# Patient Record
Sex: FEMALE | Race: WHITE | NOT HISPANIC OR LATINO | Employment: UNEMPLOYED | URBAN - METROPOLITAN AREA
[De-identification: names, ages, dates, MRNs, and addresses within clinical notes are randomized per-mention and may not be internally consistent; named-entity substitution may affect disease eponyms.]

---

## 2017-07-06 ENCOUNTER — HOSPITAL ENCOUNTER (EMERGENCY)
Facility: HOSPITAL | Age: 47
Discharge: HOME | End: 2017-07-06
Attending: EMERGENCY MEDICINE
Payer: COMMERCIAL

## 2017-07-06 ENCOUNTER — APPOINTMENT (EMERGENCY)
Dept: RADIOLOGY | Facility: HOSPITAL | Age: 47
End: 2017-07-06
Payer: COMMERCIAL

## 2017-07-06 VITALS
BODY MASS INDEX: 25.69 KG/M2 | RESPIRATION RATE: 18 BRPM | HEIGHT: 63 IN | HEART RATE: 82 BPM | WEIGHT: 145 LBS | OXYGEN SATURATION: 97 % | SYSTOLIC BLOOD PRESSURE: 137 MMHG | DIASTOLIC BLOOD PRESSURE: 94 MMHG | TEMPERATURE: 97.6 F

## 2017-07-06 DIAGNOSIS — S60.222A CONTUSION OF LEFT HAND: Primary | ICD-10-CM

## 2017-07-06 PROCEDURE — 99283 EMERGENCY DEPT VISIT LOW MDM: CPT

## 2017-07-06 PROCEDURE — 73110 X-RAY EXAM OF WRIST: CPT

## 2017-07-06 PROCEDURE — 73130 X-RAY EXAM OF HAND: CPT

## 2018-11-08 ENCOUNTER — OFFICE VISIT (OUTPATIENT)
Dept: FAMILY MEDICINE CLINIC | Facility: CLINIC | Age: 48
End: 2018-11-08
Payer: COMMERCIAL

## 2018-11-08 VITALS
OXYGEN SATURATION: 98 % | HEIGHT: 63 IN | BODY MASS INDEX: 24.63 KG/M2 | DIASTOLIC BLOOD PRESSURE: 70 MMHG | HEART RATE: 88 BPM | SYSTOLIC BLOOD PRESSURE: 102 MMHG | WEIGHT: 139 LBS | TEMPERATURE: 97.4 F

## 2018-11-08 DIAGNOSIS — Z00.00 HEALTH MAINTENANCE EXAMINATION: ICD-10-CM

## 2018-11-08 DIAGNOSIS — D21.9 FIBROMA: ICD-10-CM

## 2018-11-08 DIAGNOSIS — G47.00 INSOMNIA, UNSPECIFIED TYPE: Primary | ICD-10-CM

## 2018-11-08 DIAGNOSIS — Z72.0 TOBACCO USE: ICD-10-CM

## 2018-11-08 DIAGNOSIS — E78.5 HYPERLIPIDEMIA, UNSPECIFIED HYPERLIPIDEMIA TYPE: ICD-10-CM

## 2018-11-08 DIAGNOSIS — R53.83 FATIGUE, UNSPECIFIED TYPE: ICD-10-CM

## 2018-11-08 DIAGNOSIS — M79.641 PAIN OF RIGHT HAND: ICD-10-CM

## 2018-11-08 PROCEDURE — 99204 OFFICE O/P NEW MOD 45 MIN: CPT | Performed by: FAMILY MEDICINE

## 2018-11-08 PROCEDURE — 3008F BODY MASS INDEX DOCD: CPT | Performed by: FAMILY MEDICINE

## 2018-11-08 RX ORDER — ZOLPIDEM TARTRATE 6.25 MG/1
TABLET, FILM COATED, EXTENDED RELEASE ORAL
Qty: 30 TABLET | Refills: 1 | Status: SHIPPED | OUTPATIENT
Start: 2018-11-08 | End: 2018-11-15

## 2018-11-08 NOTE — PROGRESS NOTES
Assessment/Plan:    Return visit in about 1 month we will remove her skin lesion at that time       Problem List Items Addressed This Visit     Health maintenance examination    Pain of right hand    Relevant Orders    XR hand 3+ vw right    Fibroma    Insomnia - Primary     She has tried multiple over-the-counter medications without relief         Relevant Medications    zolpidem (AMBIEN CR) 6 25 MG CR tablet    Fatigue    Relevant Orders    Lyme Antibody Profile with reflex to WB    TSH, 3rd generation with Free T4 reflex    CBC and differential    Hyperlipidemia     Low carb diet         Relevant Orders    Comprehensive metabolic panel    Lipid panel    Tobacco use     Smoking cessation recommended                 Subjective:      Patient ID: Jose R Croft is a 50 y o  female  This is a new patient to our practice  She has not had a primary care doctor in several years  She complains of pain in her right hand at the base of her thumb  She complains of skin lesion on her right hand that gives her pain  For the last 2 years she has had difficulty sleeping and complains of fatigue  She is a cigarette smoker but is otherwise in good health  She is bit behind on her gynecologic exam however has appointment with her gynecologist next week  The following portions of the patient's history were reviewed and updated as appropriate: allergies, current medications, past family history, past medical history, past social history, past surgical history and problem list     Review of Systems   Constitutional: Positive for fatigue  Negative for fever  HENT: Negative  Eyes: Negative  Respiratory: Negative  Cardiovascular: Negative  Gastrointestinal: Negative  Endocrine: Negative  Genitourinary: Negative  Musculoskeletal: Positive for arthralgias  Skin: Negative  Allergic/Immunologic: Negative  Neurological: Negative  Hematological: Negative  Psychiatric/Behavioral: Negative  Objective:      /70   Pulse 88   Temp (!) 97 4 °F (36 3 °C)   Ht 5' 3" (1 6 m)   Wt 63 kg (139 lb)   SpO2 98%   BMI 24 62 kg/m²          Physical Exam   Constitutional: She is oriented to person, place, and time  She appears well-developed  HENT:   Head: Normocephalic and atraumatic  Right Ear: Tympanic membrane and external ear normal    Left Ear: Tympanic membrane and external ear normal    Mouth/Throat: Oropharynx is clear and moist    Eyes: Pupils are equal, round, and reactive to light  EOM are normal    Neck: Neck supple  No thyromegaly present  Cardiovascular: Normal rate, regular rhythm and normal heart sounds  Pulmonary/Chest: Effort normal and breath sounds normal    Abdominal: Soft  Musculoskeletal: Normal range of motion  Tenderness of 1st MP joint right hand   Lymphadenopathy:     She has no cervical adenopathy  Neurological: She is alert and oriented to person, place, and time  Skin: Skin is warm and dry  Fibroma dorsum of right hand   Psychiatric: She has a normal mood and affect

## 2018-11-13 ENCOUNTER — HOSPITAL ENCOUNTER (OUTPATIENT)
Dept: RADIOLOGY | Facility: HOSPITAL | Age: 48
Discharge: HOME | End: 2018-11-13
Attending: FAMILY MEDICINE
Payer: COMMERCIAL

## 2018-11-13 ENCOUNTER — APPOINTMENT (OUTPATIENT)
Dept: LAB | Facility: HOSPITAL | Age: 48
End: 2018-11-13
Attending: FAMILY MEDICINE
Payer: COMMERCIAL

## 2018-11-13 DIAGNOSIS — E78.5 HYPERLIPIDEMIA, UNSPECIFIED HYPERLIPIDEMIA TYPE: ICD-10-CM

## 2018-11-13 DIAGNOSIS — R53.83 FATIGUE, UNSPECIFIED TYPE: ICD-10-CM

## 2018-11-13 DIAGNOSIS — M79.641 PAIN OF RIGHT HAND: ICD-10-CM

## 2018-11-13 LAB
ALBUMIN SERPL BCP-MCNC: 3.7 G/DL (ref 3.5–5)
ALP SERPL-CCNC: 98 U/L (ref 46–116)
ALT SERPL W P-5'-P-CCNC: 16 U/L (ref 12–78)
ANION GAP SERPL CALCULATED.3IONS-SCNC: 10 MMOL/L (ref 4–13)
AST SERPL W P-5'-P-CCNC: 11 U/L (ref 5–45)
BASOPHILS # BLD AUTO: 0.08 THOUSANDS/ΜL (ref 0–0.1)
BASOPHILS NFR BLD AUTO: 1 % (ref 0–1)
BILIRUB SERPL-MCNC: 0.5 MG/DL (ref 0.2–1)
BUN SERPL-MCNC: 11 MG/DL (ref 5–25)
CALCIUM SERPL-MCNC: 8.9 MG/DL (ref 8.3–10.1)
CHLORIDE SERPL-SCNC: 102 MMOL/L (ref 100–108)
CHOLEST SERPL-MCNC: 245 MG/DL (ref 50–200)
CO2 SERPL-SCNC: 28 MMOL/L (ref 21–32)
CREAT SERPL-MCNC: 0.75 MG/DL (ref 0.6–1.3)
EOSINOPHIL # BLD AUTO: 0.17 THOUSAND/ΜL (ref 0–0.61)
EOSINOPHIL NFR BLD AUTO: 2 % (ref 0–6)
ERYTHROCYTE [DISTWIDTH] IN BLOOD BY AUTOMATED COUNT: 14.5 % (ref 11.6–15.1)
GFR SERPL CREATININE-BSD FRML MDRD: 95 ML/MIN/1.73SQ M
GLUCOSE P FAST SERPL-MCNC: 87 MG/DL (ref 65–99)
HCT VFR BLD AUTO: 42.7 % (ref 34.8–46.1)
HDLC SERPL-MCNC: 51 MG/DL (ref 40–60)
HGB BLD-MCNC: 13.8 G/DL (ref 11.5–15.4)
IMM GRANULOCYTES # BLD AUTO: 0.02 THOUSAND/UL (ref 0–0.2)
IMM GRANULOCYTES NFR BLD AUTO: 0 % (ref 0–2)
LDLC SERPL CALC-MCNC: 181 MG/DL (ref 0–100)
LYMPHOCYTES # BLD AUTO: 3.83 THOUSANDS/ΜL (ref 0.6–4.47)
LYMPHOCYTES NFR BLD AUTO: 40 % (ref 14–44)
MCH RBC QN AUTO: 28.3 PG (ref 26.8–34.3)
MCHC RBC AUTO-ENTMCNC: 32.3 G/DL (ref 31.4–37.4)
MCV RBC AUTO: 88 FL (ref 82–98)
MONOCYTES # BLD AUTO: 0.87 THOUSAND/ΜL (ref 0.17–1.22)
MONOCYTES NFR BLD AUTO: 9 % (ref 4–12)
NEUTROPHILS # BLD AUTO: 4.68 THOUSANDS/ΜL (ref 1.85–7.62)
NEUTS SEG NFR BLD AUTO: 48 % (ref 43–75)
NONHDLC SERPL-MCNC: 194 MG/DL
NRBC BLD AUTO-RTO: 0 /100 WBCS
PLATELET # BLD AUTO: 252 THOUSANDS/UL (ref 149–390)
PMV BLD AUTO: 10.3 FL (ref 8.9–12.7)
POTASSIUM SERPL-SCNC: 4.2 MMOL/L (ref 3.5–5.3)
PROT SERPL-MCNC: 7.1 G/DL (ref 6.4–8.2)
RBC # BLD AUTO: 4.87 MILLION/UL (ref 3.81–5.12)
SODIUM SERPL-SCNC: 140 MMOL/L (ref 136–145)
TRIGL SERPL-MCNC: 66 MG/DL
TSH SERPL DL<=0.05 MIU/L-ACNC: 2.09 UIU/ML (ref 0.36–3.74)
WBC # BLD AUTO: 9.65 THOUSAND/UL (ref 4.31–10.16)

## 2018-11-13 PROCEDURE — 36415 COLL VENOUS BLD VENIPUNCTURE: CPT

## 2018-11-13 PROCEDURE — 85025 COMPLETE CBC W/AUTO DIFF WBC: CPT

## 2018-11-13 PROCEDURE — 80053 COMPREHEN METABOLIC PANEL: CPT

## 2018-11-13 PROCEDURE — 73130 X-RAY EXAM OF HAND: CPT

## 2018-11-13 PROCEDURE — 86618 LYME DISEASE ANTIBODY: CPT

## 2018-11-13 PROCEDURE — 80061 LIPID PANEL: CPT

## 2018-11-13 PROCEDURE — 84443 ASSAY THYROID STIM HORMONE: CPT

## 2018-11-14 LAB
B BURGDOR IGG SER IA-ACNC: 0.08
B BURGDOR IGM SER IA-ACNC: 0.3

## 2018-11-15 ENCOUNTER — TELEPHONE (OUTPATIENT)
Dept: FAMILY MEDICINE CLINIC | Facility: CLINIC | Age: 48
End: 2018-11-15

## 2018-11-15 DIAGNOSIS — G47.00 INSOMNIA, UNSPECIFIED TYPE: ICD-10-CM

## 2018-11-15 DIAGNOSIS — E78.5 HYPERLIPIDEMIA, UNSPECIFIED HYPERLIPIDEMIA TYPE: Primary | ICD-10-CM

## 2018-11-15 RX ORDER — TRAZODONE HYDROCHLORIDE 100 MG/1
TABLET ORAL
Qty: 30 TABLET | Refills: 1 | Status: SHIPPED | OUTPATIENT
Start: 2018-11-15 | End: 2020-02-03

## 2018-11-15 RX ORDER — ATORVASTATIN CALCIUM 10 MG/1
10 TABLET, FILM COATED ORAL DAILY
Qty: 30 TABLET | Refills: 5 | Status: SHIPPED | OUTPATIENT
Start: 2018-11-15 | End: 2019-05-15 | Stop reason: SDUPTHER

## 2018-11-15 NOTE — TELEPHONE ENCOUNTER
----- Message from Valorie Lynn MD sent at 11/15/2018  8:35 AM EST -----  Call patient, cholesterol is high  All other labs are normal   She should start medication for cholesterol

## 2018-11-15 NOTE — TELEPHONE ENCOUNTER
Prescription for Lipitor and trazodone sent to [facility]    She should follow up in 4 months with fasting blood work prior to visit

## 2018-11-15 NOTE — TELEPHONE ENCOUNTER
Spoke with pt  She agreed to try something for high cholesterol  She also wants to let you know that  Ambien is not working for her and if you could please send something else for her to help her sleep

## 2018-12-13 ENCOUNTER — OFFICE VISIT (OUTPATIENT)
Dept: FAMILY MEDICINE CLINIC | Facility: CLINIC | Age: 48
End: 2018-12-13
Payer: COMMERCIAL

## 2018-12-13 VITALS
WEIGHT: 143 LBS | SYSTOLIC BLOOD PRESSURE: 112 MMHG | HEIGHT: 63 IN | DIASTOLIC BLOOD PRESSURE: 70 MMHG | HEART RATE: 76 BPM | OXYGEN SATURATION: 98 % | TEMPERATURE: 96.7 F | BODY MASS INDEX: 25.34 KG/M2

## 2018-12-13 DIAGNOSIS — D21.9 FIBROMA: Primary | ICD-10-CM

## 2018-12-13 DIAGNOSIS — Z12.31 BREAST CANCER SCREENING BY MAMMOGRAM: ICD-10-CM

## 2018-12-13 DIAGNOSIS — G47.00 INSOMNIA, UNSPECIFIED TYPE: ICD-10-CM

## 2018-12-13 PROCEDURE — 88305 TISSUE EXAM BY PATHOLOGIST: CPT | Performed by: PATHOLOGY

## 2018-12-13 PROCEDURE — 11400 EXC TR-EXT B9+MARG 0.5 CM<: CPT | Performed by: FAMILY MEDICINE

## 2018-12-13 PROCEDURE — 3008F BODY MASS INDEX DOCD: CPT | Performed by: FAMILY MEDICINE

## 2018-12-13 PROCEDURE — 21012 EXC FACE LES SBQ 2 CM/>: CPT | Performed by: FAMILY MEDICINE

## 2018-12-13 PROCEDURE — 99213 OFFICE O/P EST LOW 20 MIN: CPT | Performed by: FAMILY MEDICINE

## 2018-12-13 RX ORDER — ZOLPIDEM TARTRATE 6.25 MG/1
1 TABLET, FILM COATED, EXTENDED RELEASE ORAL
Refills: 0 | COMMUNITY
Start: 2018-11-27 | End: 2018-12-13 | Stop reason: SDUPTHER

## 2018-12-13 RX ORDER — ZOLPIDEM TARTRATE 12.5 MG/1
12.5 TABLET, FILM COATED, EXTENDED RELEASE ORAL
Qty: 30 TABLET | Refills: 5 | Status: SHIPPED | OUTPATIENT
Start: 2018-12-13 | End: 2019-06-13 | Stop reason: SDUPTHER

## 2018-12-13 NOTE — PROGRESS NOTES
Assessment/Plan:           Problem List Items Addressed This Visit     None      Visit Diagnoses     Breast cancer screening by mammogram    -  Primary    Relevant Orders    Mammo screening bilateral w cad            Subjective:      Patient ID: Stephen Blount is a 50 y o  female  Patient comes in for skin lesion removal from right wrist         The following portions of the patient's history were reviewed and updated as appropriate: allergies, current medications, past family history, past medical history, past social history, past surgical history and problem list     Review of Systems   Constitutional: Negative  Respiratory: Negative  Cardiovascular: Negative            Objective:      /70   Pulse 76   Temp (!) 96 7 °F (35 9 °C)   Ht 5' 3" (1 6 m)   Wt 64 9 kg (143 lb)   SpO2 98%   BMI 25 33 kg/m²            Physical Exam   Skin:   Nodular lesion dorsum of right wrist     Skin excision  Date/Time: 12/13/2018 12:34 PM  Performed by: Angela Yost  Authorized by: Angela Yost     Procedure Details - Skin Excision:     Number of Lesions:  1  Lesion 1:     Body area:  Upper extremity    Upper extremity location:  R wrist       Malignancy: benign lesion      Repair type:  Linear closure    Closure complexity: simple      Surgical defect:  2 cm    Repair size (cm):  2     Wound repaired with 4 Ethilon x3

## 2018-12-24 ENCOUNTER — OFFICE VISIT (OUTPATIENT)
Dept: FAMILY MEDICINE CLINIC | Facility: CLINIC | Age: 48
End: 2018-12-24
Payer: COMMERCIAL

## 2018-12-24 VITALS
BODY MASS INDEX: 25.34 KG/M2 | SYSTOLIC BLOOD PRESSURE: 108 MMHG | HEART RATE: 72 BPM | DIASTOLIC BLOOD PRESSURE: 70 MMHG | HEIGHT: 63 IN | OXYGEN SATURATION: 96 % | RESPIRATION RATE: 16 BRPM | WEIGHT: 143 LBS | TEMPERATURE: 98 F

## 2018-12-24 DIAGNOSIS — D23.9 DERMATOFIBROMA: Primary | ICD-10-CM

## 2018-12-24 PROCEDURE — 99213 OFFICE O/P EST LOW 20 MIN: CPT | Performed by: FAMILY MEDICINE

## 2018-12-24 NOTE — PROGRESS NOTES
Patient comes in for suture removal   Three sutures removed from right wrist   Area is healing well without sign of infection

## 2019-01-11 DIAGNOSIS — Z12.31 BREAST CANCER SCREENING BY MAMMOGRAM: ICD-10-CM

## 2019-05-15 DIAGNOSIS — E78.5 HYPERLIPIDEMIA, UNSPECIFIED HYPERLIPIDEMIA TYPE: ICD-10-CM

## 2019-05-15 RX ORDER — ATORVASTATIN CALCIUM 10 MG/1
10 TABLET, FILM COATED ORAL DAILY
Qty: 30 TABLET | Refills: 5 | Status: SHIPPED | OUTPATIENT
Start: 2019-05-15 | End: 2019-11-17 | Stop reason: SDUPTHER

## 2019-06-13 DIAGNOSIS — G47.00 INSOMNIA, UNSPECIFIED TYPE: ICD-10-CM

## 2019-06-14 RX ORDER — ZOLPIDEM TARTRATE 12.5 MG/1
12.5 TABLET, FILM COATED, EXTENDED RELEASE ORAL
Qty: 30 TABLET | Refills: 0 | Status: SHIPPED | OUTPATIENT
Start: 2019-06-14 | End: 2019-07-15 | Stop reason: SDUPTHER

## 2019-07-15 DIAGNOSIS — G47.00 INSOMNIA, UNSPECIFIED TYPE: ICD-10-CM

## 2019-07-15 RX ORDER — ZOLPIDEM TARTRATE 12.5 MG/1
TABLET, FILM COATED, EXTENDED RELEASE ORAL
Qty: 30 TABLET | Refills: 0 | Status: SHIPPED | OUTPATIENT
Start: 2019-07-15 | End: 2019-08-18 | Stop reason: SDUPTHER

## 2019-08-18 DIAGNOSIS — G47.00 INSOMNIA, UNSPECIFIED TYPE: ICD-10-CM

## 2019-08-18 RX ORDER — ZOLPIDEM TARTRATE 12.5 MG/1
12.5 TABLET, FILM COATED, EXTENDED RELEASE ORAL
Qty: 30 TABLET | Refills: 3 | Status: SHIPPED | OUTPATIENT
Start: 2019-08-18 | End: 2019-12-18 | Stop reason: SDUPTHER

## 2019-11-17 DIAGNOSIS — E78.5 HYPERLIPIDEMIA, UNSPECIFIED HYPERLIPIDEMIA TYPE: ICD-10-CM

## 2019-11-17 RX ORDER — ATORVASTATIN CALCIUM 10 MG/1
TABLET, FILM COATED ORAL
Qty: 30 TABLET | Refills: 5 | Status: SHIPPED | OUTPATIENT
Start: 2019-11-17 | End: 2020-05-13

## 2019-12-18 DIAGNOSIS — G47.00 INSOMNIA, UNSPECIFIED TYPE: ICD-10-CM

## 2019-12-18 RX ORDER — ZOLPIDEM TARTRATE 12.5 MG/1
TABLET, FILM COATED, EXTENDED RELEASE ORAL
Qty: 30 TABLET | Refills: 0 | Status: SHIPPED | OUTPATIENT
Start: 2019-12-18 | End: 2020-02-03 | Stop reason: SDUPTHER

## 2020-01-17 DIAGNOSIS — G47.00 INSOMNIA, UNSPECIFIED TYPE: ICD-10-CM

## 2020-01-19 RX ORDER — ZOLPIDEM TARTRATE 12.5 MG/1
TABLET, FILM COATED, EXTENDED RELEASE ORAL
Qty: 30 TABLET | Refills: 0 | OUTPATIENT
Start: 2020-01-19

## 2020-02-03 ENCOUNTER — OFFICE VISIT (OUTPATIENT)
Dept: FAMILY MEDICINE CLINIC | Facility: CLINIC | Age: 50
End: 2020-02-03
Payer: COMMERCIAL

## 2020-02-03 VITALS
RESPIRATION RATE: 16 BRPM | DIASTOLIC BLOOD PRESSURE: 70 MMHG | HEART RATE: 74 BPM | WEIGHT: 156 LBS | OXYGEN SATURATION: 98 % | SYSTOLIC BLOOD PRESSURE: 110 MMHG | HEIGHT: 62 IN | BODY MASS INDEX: 28.71 KG/M2 | TEMPERATURE: 98 F

## 2020-02-03 DIAGNOSIS — G47.00 INSOMNIA, UNSPECIFIED TYPE: ICD-10-CM

## 2020-02-03 DIAGNOSIS — Z00.00 HEALTH MAINTENANCE EXAMINATION: Primary | ICD-10-CM

## 2020-02-03 DIAGNOSIS — Z12.11 COLON CANCER SCREENING: ICD-10-CM

## 2020-02-03 DIAGNOSIS — Z72.0 TOBACCO USE: ICD-10-CM

## 2020-02-03 DIAGNOSIS — E78.5 HYPERLIPIDEMIA, UNSPECIFIED HYPERLIPIDEMIA TYPE: ICD-10-CM

## 2020-02-03 PROBLEM — D23.9 DERMATOFIBROMA: Status: RESOLVED | Noted: 2018-12-24 | Resolved: 2020-02-03

## 2020-02-03 PROBLEM — M79.641 PAIN OF RIGHT HAND: Status: RESOLVED | Noted: 2018-11-08 | Resolved: 2020-02-03

## 2020-02-03 PROBLEM — D21.9 FIBROMA: Status: RESOLVED | Noted: 2018-11-08 | Resolved: 2020-02-03

## 2020-02-03 PROCEDURE — 99396 PREV VISIT EST AGE 40-64: CPT | Performed by: FAMILY MEDICINE

## 2020-02-03 RX ORDER — ZOLPIDEM TARTRATE 12.5 MG/1
12.5 TABLET, FILM COATED, EXTENDED RELEASE ORAL
Qty: 30 TABLET | Refills: 5 | Status: SHIPPED | OUTPATIENT
Start: 2020-02-03 | End: 2020-08-23

## 2020-02-03 NOTE — PROGRESS NOTES
Assessment/Plan:  Return visit in 1 year  Patient will make appointment with her gynecologist and have mammogram done  Diagnoses and all orders for this visit:    Health maintenance examination    Hyperlipidemia, unspecified hyperlipidemia type  -     CBC and differential; Future  -     Comprehensive metabolic panel; Future  -     Lipid panel; Future    Insomnia, unspecified type  -     zolpidem (AMBIEN CR) 12 5 MG CR tablet; Take 1 tablet (12 5 mg total) by mouth daily at bedtime as needed for sleep    Tobacco use    Colon cancer screening  -     Ambulatory referral to Gastroenterology; Future        Hyperlipidemia  Continue Lipitor 10 mg daily  Tobacco use  Smoking cessation recommended    BMI Counseling: Body mass index is 28 53 kg/m²  The BMI is above normal  Nutrition recommendations include decreasing portion sizes and moderation in carbohydrate intake  Exercise recommendations include exercising 3-5 times per week  No pharmacotherapy was ordered  Subjective:      Patient ID: David King is a 48 y o  female  Patient comes in for a checkup  The following portions of the patient's history were reviewed and updated as appropriate:   She has no past medical history on file  ,  does not have any pertinent problems on file  ,   has a past surgical history that includes Cervical biopsy w/ loop electrode excision  ,  family history includes Colon cancer in her maternal grandfather  ,   reports that she has been smoking  She has never used smokeless tobacco  She reports that she drinks alcohol  She reports that she does not use drugs  ,  is allergic to amoxil [amoxicillin]     Current Outpatient Medications   Medication Sig Dispense Refill    atorvastatin (LIPITOR) 10 mg tablet take 1 tablet by mouth once daily 30 tablet 5    zolpidem (AMBIEN CR) 12 5 MG CR tablet Take 1 tablet (12 5 mg total) by mouth daily at bedtime as needed for sleep 30 tablet 5     No current facility-administered medications for this visit  Review of Systems   Constitutional: Negative  HENT: Negative  Respiratory: Negative  Cardiovascular: Negative  Gastrointestinal: Negative  Endocrine: Negative  Genitourinary: Negative  Allergic/Immunologic: Negative  Neurological: Negative  Objective:  Vitals:    02/03/20 1033   BP: 110/70   Pulse: 74   Resp: 16   Temp: 98 °F (36 7 °C)   SpO2: 98%   Weight: 70 8 kg (156 lb)   Height: 5' 2" (1 575 m)     Body mass index is 28 53 kg/m²  Physical Exam   Constitutional: She is oriented to person, place, and time  She appears well-developed  HENT:   Head: Normocephalic and atraumatic  Right Ear: Tympanic membrane and external ear normal    Left Ear: Tympanic membrane and external ear normal    Mouth/Throat: Oropharynx is clear and moist    Eyes: Pupils are equal, round, and reactive to light  EOM are normal    Neck: Neck supple  No thyromegaly present  Cardiovascular: Normal rate, regular rhythm and normal heart sounds  Pulmonary/Chest: Effort normal and breath sounds normal    Abdominal: Soft  Musculoskeletal: Normal range of motion  Lymphadenopathy:     She has no cervical adenopathy  Neurological: She is alert and oriented to person, place, and time  Skin: Skin is warm and dry  Psychiatric: She has a normal mood and affect

## 2020-05-13 DIAGNOSIS — E78.5 HYPERLIPIDEMIA, UNSPECIFIED HYPERLIPIDEMIA TYPE: ICD-10-CM

## 2020-05-13 RX ORDER — ATORVASTATIN CALCIUM 10 MG/1
TABLET, FILM COATED ORAL
Qty: 30 TABLET | Refills: 5 | Status: SHIPPED | OUTPATIENT
Start: 2020-05-13 | End: 2020-09-14 | Stop reason: HOSPADM

## 2020-07-21 ENCOUNTER — HOSPITAL ENCOUNTER (EMERGENCY)
Facility: HOSPITAL | Age: 50
Discharge: HOME | DRG: 871 | End: 2020-07-22
Attending: EMERGENCY MEDICINE
Payer: COMMERCIAL

## 2020-07-21 DIAGNOSIS — R19.7 DIARRHEA: ICD-10-CM

## 2020-07-21 DIAGNOSIS — E87.6 HYPOKALEMIA: ICD-10-CM

## 2020-07-21 DIAGNOSIS — E86.0 DEHYDRATION: Primary | ICD-10-CM

## 2020-07-21 LAB
ALBUMIN SERPL BCP-MCNC: 2.5 G/DL (ref 3.5–5)
ALP SERPL-CCNC: 112 U/L (ref 46–116)
ALT SERPL W P-5'-P-CCNC: 14 U/L (ref 12–78)
ANION GAP SERPL CALCULATED.3IONS-SCNC: 13 MMOL/L (ref 4–13)
AST SERPL W P-5'-P-CCNC: 28 U/L (ref 5–45)
BASOPHILS # BLD AUTO: 0.05 THOUSANDS/ΜL (ref 0–0.1)
BASOPHILS NFR BLD AUTO: 0 % (ref 0–1)
BILIRUB SERPL-MCNC: 0.6 MG/DL (ref 0.2–1)
BUN SERPL-MCNC: 10 MG/DL (ref 5–25)
CALCIUM SERPL-MCNC: 7.4 MG/DL (ref 8.3–10.1)
CHLORIDE SERPL-SCNC: 96 MMOL/L (ref 100–108)
CO2 SERPL-SCNC: 27 MMOL/L (ref 21–32)
CREAT SERPL-MCNC: 1.3 MG/DL (ref 0.6–1.3)
EOSINOPHIL # BLD AUTO: 0.04 THOUSAND/ΜL (ref 0–0.61)
EOSINOPHIL NFR BLD AUTO: 0 % (ref 0–6)
ERYTHROCYTE [DISTWIDTH] IN BLOOD BY AUTOMATED COUNT: 15.5 % (ref 11.6–15.1)
GFR SERPL CREATININE-BSD FRML MDRD: 48 ML/MIN/1.73SQ M
GLUCOSE SERPL-MCNC: 109 MG/DL (ref 65–140)
HCT VFR BLD AUTO: 36.2 % (ref 34.8–46.1)
HGB BLD-MCNC: 12.3 G/DL (ref 11.5–15.4)
IMM GRANULOCYTES # BLD AUTO: 0.15 THOUSAND/UL (ref 0–0.2)
IMM GRANULOCYTES NFR BLD AUTO: 1 % (ref 0–2)
LYMPHOCYTES # BLD AUTO: 0.98 THOUSANDS/ΜL (ref 0.6–4.47)
LYMPHOCYTES NFR BLD AUTO: 8 % (ref 14–44)
MCH RBC QN AUTO: 30.1 PG (ref 26.8–34.3)
MCHC RBC AUTO-ENTMCNC: 34 G/DL (ref 31.4–37.4)
MCV RBC AUTO: 89 FL (ref 82–98)
MONOCYTES # BLD AUTO: 1.26 THOUSAND/ΜL (ref 0.17–1.22)
MONOCYTES NFR BLD AUTO: 10 % (ref 4–12)
NEUTROPHILS # BLD AUTO: 10.28 THOUSANDS/ΜL (ref 1.85–7.62)
NEUTS SEG NFR BLD AUTO: 81 % (ref 43–75)
NRBC BLD AUTO-RTO: 0 /100 WBCS
PLATELET # BLD AUTO: 155 THOUSANDS/UL (ref 149–390)
PMV BLD AUTO: 11.3 FL (ref 8.9–12.7)
POTASSIUM SERPL-SCNC: 2.4 MMOL/L (ref 3.5–5.3)
PROT SERPL-MCNC: 6.8 G/DL (ref 6.4–8.2)
RBC # BLD AUTO: 4.08 MILLION/UL (ref 3.81–5.12)
SARS-COV-2 RNA RESP QL NAA+PROBE: NEGATIVE
SODIUM SERPL-SCNC: 136 MMOL/L (ref 136–145)
TROPONIN I SERPL-MCNC: 0.02 NG/ML
WBC # BLD AUTO: 12.76 THOUSAND/UL (ref 4.31–10.16)

## 2020-07-21 PROCEDURE — 96375 TX/PRO/DX INJ NEW DRUG ADDON: CPT

## 2020-07-21 PROCEDURE — 36415 COLL VENOUS BLD VENIPUNCTURE: CPT | Performed by: PHYSICIAN ASSISTANT

## 2020-07-21 PROCEDURE — 99284 EMERGENCY DEPT VISIT MOD MDM: CPT | Performed by: PHYSICIAN ASSISTANT

## 2020-07-21 PROCEDURE — 85025 COMPLETE CBC W/AUTO DIFF WBC: CPT | Performed by: PHYSICIAN ASSISTANT

## 2020-07-21 PROCEDURE — 96361 HYDRATE IV INFUSION ADD-ON: CPT

## 2020-07-21 PROCEDURE — 93005 ELECTROCARDIOGRAM TRACING: CPT

## 2020-07-21 PROCEDURE — 96365 THER/PROPH/DIAG IV INF INIT: CPT

## 2020-07-21 PROCEDURE — 87635 SARS-COV-2 COVID-19 AMP PRB: CPT | Performed by: PHYSICIAN ASSISTANT

## 2020-07-21 PROCEDURE — 99284 EMERGENCY DEPT VISIT MOD MDM: CPT

## 2020-07-21 PROCEDURE — 80053 COMPREHEN METABOLIC PANEL: CPT | Performed by: PHYSICIAN ASSISTANT

## 2020-07-21 PROCEDURE — 84484 ASSAY OF TROPONIN QUANT: CPT | Performed by: PHYSICIAN ASSISTANT

## 2020-07-21 RX ORDER — POTASSIUM CHLORIDE 20 MEQ/1
40 TABLET, EXTENDED RELEASE ORAL ONCE
Status: COMPLETED | OUTPATIENT
Start: 2020-07-21 | End: 2020-07-21

## 2020-07-21 RX ORDER — POTASSIUM CHLORIDE 14.9 MG/ML
20 INJECTION INTRAVENOUS ONCE
Status: COMPLETED | OUTPATIENT
Start: 2020-07-21 | End: 2020-07-22

## 2020-07-21 RX ORDER — ONDANSETRON 2 MG/ML
4 INJECTION INTRAMUSCULAR; INTRAVENOUS ONCE
Status: COMPLETED | OUTPATIENT
Start: 2020-07-21 | End: 2020-07-21

## 2020-07-21 RX ORDER — ONDANSETRON 4 MG/1
4 TABLET, ORALLY DISINTEGRATING ORAL EVERY 8 HOURS PRN
Qty: 15 TABLET | Refills: 0 | Status: SHIPPED | OUTPATIENT
Start: 2020-07-21 | End: 2020-09-14 | Stop reason: HOSPADM

## 2020-07-21 RX ADMIN — POTASSIUM CHLORIDE 40 MEQ: 1500 TABLET, EXTENDED RELEASE ORAL at 23:18

## 2020-07-21 RX ADMIN — POTASSIUM CHLORIDE 40 MEQ: 1500 TABLET, EXTENDED RELEASE ORAL at 22:16

## 2020-07-21 RX ADMIN — POTASSIUM CHLORIDE 20 MEQ: 200 INJECTION, SOLUTION INTRAVENOUS at 23:29

## 2020-07-21 RX ADMIN — SODIUM CHLORIDE 1000 ML: 0.9 INJECTION, SOLUTION INTRAVENOUS at 21:21

## 2020-07-21 RX ADMIN — SODIUM CHLORIDE 1000 ML: 0.9 INJECTION, SOLUTION INTRAVENOUS at 22:21

## 2020-07-21 RX ADMIN — ONDANSETRON 4 MG: 2 INJECTION INTRAMUSCULAR; INTRAVENOUS at 21:35

## 2020-07-22 VITALS
HEART RATE: 92 BPM | OXYGEN SATURATION: 93 % | WEIGHT: 156.09 LBS | DIASTOLIC BLOOD PRESSURE: 67 MMHG | RESPIRATION RATE: 23 BRPM | SYSTOLIC BLOOD PRESSURE: 123 MMHG | BODY MASS INDEX: 28.72 KG/M2 | HEIGHT: 62 IN | TEMPERATURE: 99.7 F

## 2020-07-22 LAB
ATRIAL RATE: 87 BPM
ATRIAL RATE: 91 BPM
P AXIS: 70 DEGREES
P AXIS: 71 DEGREES
PR INTERVAL: 132 MS
PR INTERVAL: 136 MS
QRS AXIS: 75 DEGREES
QRS AXIS: 76 DEGREES
QRSD INTERVAL: 74 MS
QRSD INTERVAL: 78 MS
QT INTERVAL: 320 MS
QT INTERVAL: 390 MS
QTC INTERVAL: 385 MS
QTC INTERVAL: 479 MS
T WAVE AXIS: -61 DEGREES
T WAVE AXIS: -84 DEGREES
VENTRICULAR RATE: 87 BPM
VENTRICULAR RATE: 91 BPM

## 2020-07-22 PROCEDURE — 93010 ELECTROCARDIOGRAM REPORT: CPT | Performed by: INTERNAL MEDICINE

## 2020-07-22 PROCEDURE — 96366 THER/PROPH/DIAG IV INF ADDON: CPT

## 2020-07-22 NOTE — ED PROVIDER NOTES
History  Chief Complaint   Patient presents with    Dehydration     "I am pretty dehydrated"  Patient had a few day hx of diarrhea and vomitting which has now resolved   states she is dehydrated from inabiltiy to drink over the last few days      47 yo female with diarrhea and nausea for 3-4 days  She attributes this to drinking heavily over the past few days  Now stopped  No abd pain, chest pain, sob, syncope  Feels fatigued and run down  Thinks she is dehydrated  Has not been able to eat and drink 2/2 nausea  Diarrhea now resolved today  No recent abx  No new/unusual foods  No sick contacts  No covid contacts  History provided by:  Patient   used: No    Diarrhea   Quality:  Semi-solid  Severity:  Moderate  Onset quality:  Gradual  Duration:  3 days  Timing:  Constant  Progression:  Resolved  Relieved by:  Nothing  Worsened by:  Nothing  Ineffective treatments:  None tried  Associated symptoms: no abdominal pain, no arthralgias, no chills, no recent cough, no diaphoresis, no fever, no headaches, no myalgias, no URI and no vomiting        Prior to Admission Medications   Prescriptions Last Dose Informant Patient Reported? Taking?   atorvastatin (LIPITOR) 10 mg tablet   No No   Sig: take 1 tablet by mouth once daily   zolpidem (AMBIEN CR) 12 5 MG CR tablet   No No   Sig: Take 1 tablet (12 5 mg total) by mouth daily at bedtime as needed for sleep      Facility-Administered Medications: None       History reviewed  No pertinent past medical history  Past Surgical History:   Procedure Laterality Date    CERVICAL BIOPSY  W/ LOOP ELECTRODE EXCISION         Family History   Problem Relation Age of Onset    Colon cancer Maternal Grandfather      I have reviewed and agree with the history as documented      E-Cigarette/Vaping     E-Cigarette/Vaping Substances     Social History     Tobacco Use    Smoking status: Current Every Day Smoker     Types: Cigarettes    Smokeless tobacco: Never Used Substance Use Topics    Alcohol use: Not Currently     Frequency: 4 or more times a week     Comment: "was drinking daily, stopped saturday"    Drug use: No       Review of Systems   Constitutional: Positive for fatigue  Negative for activity change, appetite change, chills, diaphoresis, fever and unexpected weight change  HENT: Negative for congestion, rhinorrhea, sinus pressure, sore throat and trouble swallowing  Eyes: Negative for photophobia and visual disturbance  Respiratory: Negative for apnea, cough, choking, chest tightness, shortness of breath, wheezing and stridor  Cardiovascular: Negative for chest pain, palpitations and leg swelling  Gastrointestinal: Positive for diarrhea and nausea  Negative for abdominal distention, abdominal pain, blood in stool, constipation and vomiting  Genitourinary: Negative for decreased urine volume, difficulty urinating, dysuria, enuresis, flank pain, frequency, hematuria and urgency  Musculoskeletal: Negative for arthralgias, myalgias, neck pain and neck stiffness  Skin: Negative for color change, pallor, rash and wound  Allergic/Immunologic: Negative  Neurological: Negative for dizziness, tremors, syncope, weakness, light-headedness, numbness and headaches  Hematological: Negative  Psychiatric/Behavioral: Negative  All other systems reviewed and are negative  Physical Exam  Physical Exam   Constitutional: She is oriented to person, place, and time  She appears well-developed and well-nourished  Non-toxic appearance  She does not have a sickly appearance  She does not appear ill  No distress  HENT:   Head: Normocephalic and atraumatic  Eyes: Pupils are equal, round, and reactive to light  EOM and lids are normal    Neck: Normal range of motion  Neck supple  Cardiovascular: Normal rate, regular rhythm, S1 normal, S2 normal, normal heart sounds, intact distal pulses and normal pulses   Exam reveals no gallop, no distant heart sounds, no friction rub and no decreased pulses  No murmur heard  Pulses:       Radial pulses are 2+ on the right side, and 2+ on the left side  Pulmonary/Chest: Effort normal and breath sounds normal  No accessory muscle usage  No apnea, no tachypnea and no bradypnea  No respiratory distress  She has no decreased breath sounds  She has no wheezes  She has no rhonchi  She has no rales  Abdominal: Soft  Normal appearance  She exhibits no distension  There is no tenderness  There is no rigidity, no rebound and no guarding  Musculoskeletal: Normal range of motion  She exhibits no edema, tenderness or deformity  Neurological: She is alert and oriented to person, place, and time  No cranial nerve deficit  GCS eye subscore is 4  GCS verbal subscore is 5  GCS motor subscore is 6  GCS 15  AAOx3  Ambulating in department without difficulty  CN II-XII grossly intact  No focal neuro deficits  Skin: Skin is warm, dry and intact  No rash noted  She is not diaphoretic  No erythema  No pallor  Psychiatric: Her speech is normal    Nursing note and vitals reviewed        Vital Signs  ED Triage Vitals [07/21/20 1901]   Temperature Pulse Respirations Blood Pressure SpO2   99 7 °F (37 6 °C) 94 18 124/67 95 %      Temp Source Heart Rate Source Patient Position - Orthostatic VS BP Location FiO2 (%)   Oral Monitor Sitting Left arm --      Pain Score       --           Vitals:    07/21/20 2200 07/21/20 2300 07/22/20 0000 07/22/20 0100   BP: 122/61 104/61 117/63 123/67   Pulse: 91 84 97 92   Patient Position - Orthostatic VS: Lying Lying Lying Lying         Visual Acuity      ED Medications  Medications   sodium chloride 0 9 % bolus 1,000 mL (0 mL Intravenous Stopped 7/21/20 2221)   ondansetron (ZOFRAN) injection 4 mg (4 mg Intravenous Given 7/21/20 2135)   potassium chloride (K-DUR,KLOR-CON) CR tablet 40 mEq (40 mEq Oral Given 7/21/20 2216)   sodium chloride 0 9 % bolus 1,000 mL (0 mL Intravenous Stopped 7/22/20 0121) potassium chloride (K-DUR,KLOR-CON) CR tablet 40 mEq (40 mEq Oral Given 7/21/20 2318)   potassium chloride 20 mEq IVPB (premix) (0 mEq Intravenous Stopped 7/22/20 0121)       Diagnostic Studies  Results Reviewed     Procedure Component Value Units Date/Time    Troponin I [[id number]]  (Normal) Collected:  07/21/20 2239    Lab Status:  Final result Specimen:  Blood from Arm, Right Updated:  07/21/20 2303     Troponin I 0 02 ng/mL     [facility] [[id number]]  (Normal) Collected:  07/21/20 2123    Lab Status:  Final result Specimen:  Nares from Nose Updated:  07/21/20 2221     SARS-CoV-2 Negative    Narrative: The specimen collection materials, transport medium, and/or testing methodology utilized in the production of these test results have been proven to be reliable in a limited validation with an abbreviated program under the Emergency Utilization Authorization provided by the FDA  Testing reported as "Presumptive positive" will be confirmed with secondary testing with a reference laboratory to ensure result accuracy  Clinical caution and judgement should be used with the interpretation of these results with consideration of the clinical impression and other laboratory testing  Testing reported as "Positive" or "Negative" has been proven to be accurate according to standard laboratory validation requirements  All testing is performed with control materials showing appropriate reactivity at standard intervals        Comprehensive metabolic panel [[id number]]  (Abnormal) Collected:  07/21/20 2120    Lab Status:  Final result Specimen:  Blood from Arm, Right Updated:  07/21/20 2155     Sodium 136 mmol/L      Potassium 2 4 mmol/L      Chloride 96 mmol/L      CO2 27 mmol/L      ANION GAP 13 mmol/L      BUN 10 mg/dL      Creatinine 1 30 mg/dL      Glucose 109 mg/dL      Calcium 7 4 mg/dL      AST 28 U/L      ALT 14 U/L      Alkaline Phosphatase 112 U/L      Total Protein 6 8 g/dL Albumin 2 5 g/dL      Total Bilirubin 0 60 mg/dL      eGFR 48 ml/min/1 73sq m     Narrative:       National Kidney Disease Foundation guidelines for Chronic Kidney Disease (CKD):     Stage 1 with normal or high GFR (GFR > 90 mL/min/1 73 square meters)    Stage 2 Mild CKD (GFR = 60-89 mL/min/1 73 square meters)    Stage 3A Moderate CKD (GFR = 45-59 mL/min/1 73 square meters)    Stage 3B Moderate CKD (GFR = 30-44 mL/min/1 73 square meters)    Stage 4 Severe CKD (GFR = 15-29 mL/min/1 73 square meters)    Stage 5 End Stage CKD (GFR <15 mL/min/1 73 square meters)  Note: GFR calculation is accurate only with a steady state creatinine    CBC and differential [[id number]]  (Abnormal) Collected:  07/21/20 2120    Lab Status:  Final result Specimen:  Blood from Arm, Right Updated:  07/21/20 2129     WBC 12 76 Thousand/uL      RBC 4 08 Million/uL      Hemoglobin 12 3 g/dL      Hematocrit 36 2 %      MCV 89 fL      MCH 30 1 pg      MCHC 34 0 g/dL      RDW 15 5 %      MPV 11 3 fL      Platelets 616 Thousands/uL      nRBC 0 /100 WBCs      Neutrophils Relative 81 %      Immat GRANS % 1 %      Lymphocytes Relative 8 %      Monocytes Relative 10 %      Eosinophils Relative 0 %      Basophils Relative 0 %      Neutrophils Absolute 10 28 Thousands/µL      Immature Grans Absolute 0 15 Thousand/uL      Lymphocytes Absolute 0 98 Thousands/µL      Monocytes Absolute 1 26 Thousand/µL      Eosinophils Absolute 0 04 Thousand/µL      Basophils Absolute 0 05 Thousands/µL                  No orders to display              Procedures  Procedures         ED Course       US AUDIT      Most Recent Value   Initial Alcohol Screen: US AUDIT-C    1  How often do you have a drink containing alcohol? 3 Filed at: 07/21/2020 1903   2  How many drinks containing alcohol do you have on a typical day you are drinking? 1 Filed at: 07/21/2020 1903   3a  Male UNDER 65: How often do you have five or more drinks on one occasion?   0 Filed at: 07/21/2020 9960 3b  FEMALE Any Age, or MALE 65+: How often do you have 4 or more drinks on one occassion? 0 Filed at: 07/21/2020 1903   Audit-C Score  4 Filed at: 07/21/2020 1903            HEART Risk Score      Most Recent Value   Heart Score Risk Calculator   History  0 Filed at: 07/21/2020 2314   ECG  1 Filed at: 07/21/2020 2314   Age  1 Filed at: 07/21/2020 2314   Risk Factors  1 Filed at: 07/21/2020 2314   Troponin  0 Filed at: 07/21/2020 2314   HEART Score  3 Filed at: 07/21/2020 2314            HUGO/DAST-10      Most Recent Value   How many times in the past year have you    Used an illegal drug or used a prescription medication for non-medical reasons? Never Filed at: 07/21/2020 1903                                MDM  Number of Diagnoses or Management Options  Dehydration: new and requires workup  Diarrhea: new and requires workup  Hypokalemia: new and requires workup  Diagnosis management comments: DDx including but not limited to: food poisoning, viral illness, colitis, enteritis, metabolic abnormality, IBS, IBD, ileus, bowel obstruction, appendicitis, pancreatitis, hepatitis, mesenteric adenitis, mesenteric ischemia, toxic megacolon, cholecystitis, biliary colic, pyelonephritis, UTI, renal colic  Amount and/or Complexity of Data Reviewed  Clinical lab tests: ordered and reviewed  Independent visualization of images, tracings, or specimens: yes    Risk of Complications, Morbidity, and/or Mortality  Presenting problems: moderate  Management options: low  General comments: 49 yo with diarrhea, fatigue  Diarrhea resolved today  Clinically appears dehydrated  Hypokalemia consistent with diarrhea  Likely viral syndrome  covid negative, however explained to pt this is not 100% accurate and could still potentially be covid  Potassium was replaced in ED  Trop negative  EKG without ST depression/elevation  Pt feels much better   The etiology of her hypokalemia was likely her diarrhea which is now resolved and therefore comfortable discharging this patient home for outpatient follow up  Pt agrees  Stable at the time of discharge  Return parameters provided  Pt understands and agrees with plan  Patient Progress  Patient progress: stable        Disposition  Final diagnoses:   Dehydration   Hypokalemia   Diarrhea     Time reflects when diagnosis was documented in both MDM as applicable and the Disposition within this note     Time User Action Codes Description Comment    7/21/2020 10:16 PM Ildefonso Richards Add [E86 0] Dehydration     7/21/2020 10:16 PM Dorleandra Meadea L Add [E87 6] Hypokalemia     7/21/2020 10:16 PM Ildefonso Maurice Add [R19 7] Diarrhea       ED Disposition     ED Disposition Condition Date/Time Comment    Discharge Stable Tue Jul 21, 2020 11:15 PM Anju Early discharge to home/self care  Follow-up Information     Follow up With Specialties Details Why Contact Info    Rivas Rudd MD Family Medicine Call in 1 day for follow up on Thursday or Friday of this week [address] [facility]  [phone]            Discharge Medication List as of 7/21/2020 11:15 PM      START taking these medications    Details   ondansetron (ZOFRAN-ODT) 4 mg disintegrating tablet Take 1 tablet (4 mg total) by mouth every 8 (eight) hours as needed for nausea, Starting Tue 7/21/2020, Print         CONTINUE these medications which have NOT CHANGED    Details   atorvastatin (LIPITOR) 10 mg tablet take 1 tablet by mouth once daily, Normal      zolpidem (AMBIEN CR) 12 5 MG CR tablet Take 1 tablet (12 5 mg total) by mouth daily at bedtime as needed for sleep, Starting Mon 2/3/2020, Normal           No discharge procedures on file      PDMP Review     None          ED Provider  Electronically Signed by           Malorie Salcedo PA-C  07/22/20 2745

## 2020-07-24 ENCOUNTER — APPOINTMENT (EMERGENCY)
Dept: RADIOLOGY | Facility: HOSPITAL | Age: 50
DRG: 871 | End: 2020-07-24
Payer: COMMERCIAL

## 2020-07-24 ENCOUNTER — APPOINTMENT (EMERGENCY)
Dept: CT IMAGING | Facility: HOSPITAL | Age: 50
DRG: 871 | End: 2020-07-24
Payer: COMMERCIAL

## 2020-07-24 ENCOUNTER — HOSPITAL ENCOUNTER (INPATIENT)
Facility: HOSPITAL | Age: 50
LOS: 10 days | Discharge: HOME | DRG: 871 | End: 2020-08-04
Attending: EMERGENCY MEDICINE | Admitting: STUDENT IN AN ORGANIZED HEALTH CARE EDUCATION/TRAINING PROGRAM
Payer: COMMERCIAL

## 2020-07-24 DIAGNOSIS — R73.9 HYPERGLYCEMIA: ICD-10-CM

## 2020-07-24 DIAGNOSIS — J18.9 PNEUMONIA: ICD-10-CM

## 2020-07-24 DIAGNOSIS — J96.01 ACUTE RESPIRATORY FAILURE WITH HYPOXIA (HCC): ICD-10-CM

## 2020-07-24 DIAGNOSIS — E11.9 DIABETES MELLITUS, NEW ONSET (HCC): ICD-10-CM

## 2020-07-24 DIAGNOSIS — A41.9 SEPSIS (HCC): ICD-10-CM

## 2020-07-24 DIAGNOSIS — A48.1 LEGIONELLA PNEUMONIA (HCC): ICD-10-CM

## 2020-07-24 DIAGNOSIS — R41.82 ALTERED MENTAL STATUS: Primary | ICD-10-CM

## 2020-07-24 DIAGNOSIS — R41.82 ALTERED MENTAL STATUS, UNSPECIFIED ALTERED MENTAL STATUS TYPE: ICD-10-CM

## 2020-07-24 LAB
ALBUMIN SERPL BCP-MCNC: 2.2 G/DL (ref 3.5–5)
ALP SERPL-CCNC: 83 U/L (ref 46–116)
ALT SERPL W P-5'-P-CCNC: 25 U/L (ref 12–78)
ANION GAP SERPL CALCULATED.3IONS-SCNC: 13 MMOL/L (ref 4–13)
APTT PPP: 45 SECONDS (ref 23–37)
AST SERPL W P-5'-P-CCNC: 132 U/L (ref 5–45)
ATRIAL RATE: 147 BPM
BASOPHILS # BLD MANUAL: 0 THOUSAND/UL (ref 0–0.1)
BASOPHILS NFR MAR MANUAL: 0 % (ref 0–1)
BILIRUB SERPL-MCNC: 0.8 MG/DL (ref 0.2–1)
BUN SERPL-MCNC: 15 MG/DL (ref 5–25)
CALCIUM SERPL-MCNC: 8.1 MG/DL (ref 8.3–10.1)
CHLORIDE SERPL-SCNC: 98 MMOL/L (ref 100–108)
CO2 SERPL-SCNC: 27 MMOL/L (ref 21–32)
CREAT SERPL-MCNC: 1.54 MG/DL (ref 0.6–1.3)
EOSINOPHIL # BLD MANUAL: 0 THOUSAND/UL (ref 0–0.4)
EOSINOPHIL NFR BLD MANUAL: 0 % (ref 0–6)
ERYTHROCYTE [DISTWIDTH] IN BLOOD BY AUTOMATED COUNT: 16.5 % (ref 11.6–15.1)
GFR SERPL CREATININE-BSD FRML MDRD: 39 ML/MIN/1.73SQ M
GLUCOSE SERPL-MCNC: 128 MG/DL (ref 65–140)
HCT VFR BLD AUTO: 36.6 % (ref 34.8–46.1)
HGB BLD-MCNC: 12.3 G/DL (ref 11.5–15.4)
INR PPP: 1.19 (ref 0.84–1.19)
LACTATE SERPL-SCNC: 2.5 MMOL/L (ref 0.5–2)
LACTATE SERPL-SCNC: 3 MMOL/L (ref 0.5–2)
LYMPHOCYTES # BLD AUTO: 1.58 THOUSAND/UL (ref 0.6–4.47)
LYMPHOCYTES # BLD AUTO: 11 % (ref 14–44)
MCH RBC QN AUTO: 29.9 PG (ref 26.8–34.3)
MCHC RBC AUTO-ENTMCNC: 33.6 G/DL (ref 31.4–37.4)
MCV RBC AUTO: 89 FL (ref 82–98)
MONOCYTES # BLD AUTO: 0.72 THOUSAND/UL (ref 0–1.22)
MONOCYTES NFR BLD: 5 % (ref 4–12)
NEUTROPHILS # BLD MANUAL: 12.04 THOUSAND/UL (ref 1.85–7.62)
NEUTS BAND NFR BLD MANUAL: 2 % (ref 0–8)
NEUTS SEG NFR BLD AUTO: 82 % (ref 43–75)
NRBC BLD AUTO-RTO: 0 /100 WBCS
PLATELET # BLD AUTO: 126 THOUSANDS/UL (ref 149–390)
PLATELET BLD QL SMEAR: ADEQUATE
PMV BLD AUTO: 13.2 FL (ref 8.9–12.7)
POTASSIUM SERPL-SCNC: 2.5 MMOL/L (ref 3.5–5.3)
PROT SERPL-MCNC: 6.8 G/DL (ref 6.4–8.2)
PROTHROMBIN TIME: 15.3 SECONDS (ref 11.6–14.5)
QRS AXIS: 101 DEGREES
QRSD INTERVAL: 66 MS
QT INTERVAL: 338 MS
QTC INTERVAL: 528 MS
RBC # BLD AUTO: 4.12 MILLION/UL (ref 3.81–5.12)
SARS-COV-2 RNA RESP QL NAA+PROBE: NEGATIVE
SODIUM SERPL-SCNC: 138 MMOL/L (ref 136–145)
T WAVE AXIS: 146 DEGREES
TOTAL CELLS COUNTED SPEC: 100
VENTRICULAR RATE: 147 BPM
WBC # BLD AUTO: 14.33 THOUSAND/UL (ref 4.31–10.16)

## 2020-07-24 PROCEDURE — 94640 AIRWAY INHALATION TREATMENT: CPT

## 2020-07-24 PROCEDURE — 87077 CULTURE AEROBIC IDENTIFY: CPT | Performed by: EMERGENCY MEDICINE

## 2020-07-24 PROCEDURE — 96366 THER/PROPH/DIAG IV INF ADDON: CPT

## 2020-07-24 PROCEDURE — 99285 EMERGENCY DEPT VISIT HI MDM: CPT | Performed by: EMERGENCY MEDICINE

## 2020-07-24 PROCEDURE — 31500 INSERT EMERGENCY AIRWAY: CPT | Performed by: EMERGENCY MEDICINE

## 2020-07-24 PROCEDURE — 93005 ELECTROCARDIOGRAM TRACING: CPT

## 2020-07-24 PROCEDURE — 99285 EMERGENCY DEPT VISIT HI MDM: CPT

## 2020-07-24 PROCEDURE — 84145 PROCALCITONIN (PCT): CPT | Performed by: EMERGENCY MEDICINE

## 2020-07-24 PROCEDURE — 80053 COMPREHEN METABOLIC PANEL: CPT | Performed by: EMERGENCY MEDICINE

## 2020-07-24 PROCEDURE — 85007 BL SMEAR W/DIFF WBC COUNT: CPT | Performed by: EMERGENCY MEDICINE

## 2020-07-24 PROCEDURE — 36415 COLL VENOUS BLD VENIPUNCTURE: CPT | Performed by: EMERGENCY MEDICINE

## 2020-07-24 PROCEDURE — 87040 BLOOD CULTURE FOR BACTERIA: CPT | Performed by: EMERGENCY MEDICINE

## 2020-07-24 PROCEDURE — 85730 THROMBOPLASTIN TIME PARTIAL: CPT | Performed by: EMERGENCY MEDICINE

## 2020-07-24 PROCEDURE — 71045 X-RAY EXAM CHEST 1 VIEW: CPT

## 2020-07-24 PROCEDURE — 70450 CT HEAD/BRAIN W/O DYE: CPT

## 2020-07-24 PROCEDURE — 93010 ELECTROCARDIOGRAM REPORT: CPT | Performed by: INTERNAL MEDICINE

## 2020-07-24 PROCEDURE — 87147 CULTURE TYPE IMMUNOLOGIC: CPT | Performed by: EMERGENCY MEDICINE

## 2020-07-24 PROCEDURE — 85027 COMPLETE CBC AUTOMATED: CPT | Performed by: EMERGENCY MEDICINE

## 2020-07-24 PROCEDURE — 83605 ASSAY OF LACTIC ACID: CPT | Performed by: EMERGENCY MEDICINE

## 2020-07-24 PROCEDURE — 96367 TX/PROPH/DG ADDL SEQ IV INF: CPT

## 2020-07-24 PROCEDURE — 87635 SARS-COV-2 COVID-19 AMP PRB: CPT | Performed by: EMERGENCY MEDICINE

## 2020-07-24 PROCEDURE — 96365 THER/PROPH/DIAG IV INF INIT: CPT

## 2020-07-24 PROCEDURE — 85610 PROTHROMBIN TIME: CPT | Performed by: EMERGENCY MEDICINE

## 2020-07-24 PROCEDURE — 96361 HYDRATE IV INFUSION ADD-ON: CPT

## 2020-07-24 RX ORDER — ACETAMINOPHEN 325 MG/1
650 TABLET ORAL ONCE
Status: COMPLETED | OUTPATIENT
Start: 2020-07-24 | End: 2020-07-24

## 2020-07-24 RX ORDER — VANCOMYCIN HYDROCHLORIDE 1 G/200ML
15 INJECTION, SOLUTION INTRAVENOUS ONCE
Status: COMPLETED | OUTPATIENT
Start: 2020-07-24 | End: 2020-07-24

## 2020-07-24 RX ORDER — SODIUM CHLORIDE 9 MG/ML
3 INJECTION INTRAVENOUS
Status: DISCONTINUED | OUTPATIENT
Start: 2020-07-24 | End: 2020-07-25

## 2020-07-24 RX ORDER — POTASSIUM CHLORIDE 14.9 MG/ML
20 INJECTION INTRAVENOUS ONCE
Status: COMPLETED | OUTPATIENT
Start: 2020-07-24 | End: 2020-07-25

## 2020-07-24 RX ORDER — POTASSIUM CHLORIDE 20 MEQ/1
40 TABLET, EXTENDED RELEASE ORAL ONCE
Status: COMPLETED | OUTPATIENT
Start: 2020-07-24 | End: 2020-07-24

## 2020-07-24 RX ORDER — ALBUTEROL SULFATE 2.5 MG/3ML
5 SOLUTION RESPIRATORY (INHALATION) ONCE
Status: COMPLETED | OUTPATIENT
Start: 2020-07-24 | End: 2020-07-24

## 2020-07-24 RX ADMIN — ACETAMINOPHEN 650 MG: 325 TABLET, FILM COATED ORAL at 22:51

## 2020-07-24 RX ADMIN — VANCOMYCIN HYDROCHLORIDE 1000 MG: 1 INJECTION, SOLUTION INTRAVENOUS at 21:23

## 2020-07-24 RX ADMIN — SODIUM CHLORIDE 1000 ML: 0.9 INJECTION, SOLUTION INTRAVENOUS at 22:14

## 2020-07-24 RX ADMIN — POTASSIUM CHLORIDE 40 MEQ: 1500 TABLET, EXTENDED RELEASE ORAL at 22:46

## 2020-07-24 RX ADMIN — POTASSIUM CHLORIDE 20 MEQ: 200 INJECTION, SOLUTION INTRAVENOUS at 22:45

## 2020-07-24 RX ADMIN — IPRATROPIUM BROMIDE 0.5 MG: 0.5 SOLUTION RESPIRATORY (INHALATION) at 21:18

## 2020-07-24 RX ADMIN — CEFEPIME HYDROCHLORIDE 2000 MG: 2 INJECTION, POWDER, FOR SOLUTION INTRAVENOUS at 21:46

## 2020-07-24 RX ADMIN — SODIUM CHLORIDE 1000 ML: 0.9 INJECTION, SOLUTION INTRAVENOUS at 21:21

## 2020-07-24 RX ADMIN — ALBUTEROL SULFATE 5 MG: 2.5 SOLUTION RESPIRATORY (INHALATION) at 21:18

## 2020-07-25 ENCOUNTER — APPOINTMENT (INPATIENT)
Dept: CT IMAGING | Facility: HOSPITAL | Age: 50
DRG: 871 | End: 2020-07-25
Payer: COMMERCIAL

## 2020-07-25 PROBLEM — E87.20 LACTIC ACIDOSIS: Status: ACTIVE | Noted: 2020-07-25

## 2020-07-25 PROBLEM — J18.9 LEFT UPPER LOBE PNEUMONIA: Status: ACTIVE | Noted: 2020-07-25

## 2020-07-25 PROBLEM — R41.82 ALTERED MENTAL STATUS: Status: ACTIVE | Noted: 2020-07-25

## 2020-07-25 PROBLEM — N17.9 AKI (ACUTE KIDNEY INJURY) (HCC): Status: ACTIVE | Noted: 2020-07-25

## 2020-07-25 PROBLEM — A48.1 LEGIONELLA PNEUMONIA (HCC): Status: ACTIVE | Noted: 2020-07-25

## 2020-07-25 PROBLEM — A41.9 SEVERE SEPSIS (HCC): Status: ACTIVE | Noted: 2020-07-25

## 2020-07-25 PROBLEM — R65.20 SEVERE SEPSIS (HCC): Status: ACTIVE | Noted: 2020-07-25

## 2020-07-25 PROBLEM — E87.6 HYPOKALEMIA: Status: ACTIVE | Noted: 2020-07-25

## 2020-07-25 PROBLEM — E87.2 LACTIC ACIDOSIS: Status: ACTIVE | Noted: 2020-07-25

## 2020-07-25 PROBLEM — J96.01 ACUTE RESPIRATORY FAILURE WITH HYPOXIA (HCC): Status: ACTIVE | Noted: 2020-07-25

## 2020-07-25 LAB
ALBUMIN SERPL BCP-MCNC: 1.5 G/DL (ref 3.5–5)
ALP SERPL-CCNC: 63 U/L (ref 46–116)
ALT SERPL W P-5'-P-CCNC: 20 U/L (ref 12–78)
ANION GAP SERPL CALCULATED.3IONS-SCNC: 11 MMOL/L (ref 4–13)
ANION GAP SERPL CALCULATED.3IONS-SCNC: 12 MMOL/L (ref 4–13)
ANION GAP SERPL CALCULATED.3IONS-SCNC: 9 MMOL/L (ref 4–13)
ARTERIAL PATENCY WRIST A: YES
AST SERPL W P-5'-P-CCNC: 120 U/L (ref 5–45)
ATRIAL RATE: 73 BPM
ATRIAL RATE: 94 BPM
BACTERIA UR QL AUTO: ABNORMAL /HPF
BASE EXCESS BLDA CALC-SCNC: -1.8 MMOL/L
BASE EXCESS BLDA CALC-SCNC: -6.3 MMOL/L
BASE EXCESS BLDA CALC-SCNC: -7.3 MMOL/L
BASOPHILS # BLD MANUAL: 0 THOUSAND/UL (ref 0–0.1)
BASOPHILS NFR MAR MANUAL: 0 % (ref 0–1)
BILIRUB SERPL-MCNC: 0.9 MG/DL (ref 0.2–1)
BILIRUB UR QL STRIP: ABNORMAL
BODY TEMPERATURE: 97.3 DEGREES FEHRENHEIT
BUN SERPL-MCNC: 13 MG/DL (ref 5–25)
BUN SERPL-MCNC: 15 MG/DL (ref 5–25)
BUN SERPL-MCNC: 16 MG/DL (ref 5–25)
CA-I BLD-SCNC: 0.92 MMOL/L (ref 1.12–1.32)
CA-I BLD-SCNC: 1.04 MMOL/L (ref 1.12–1.32)
CALCIUM SERPL-MCNC: 6 MG/DL (ref 8.3–10.1)
CALCIUM SERPL-MCNC: 7.3 MG/DL (ref 8.3–10.1)
CALCIUM SERPL-MCNC: 7.8 MG/DL (ref 8.3–10.1)
CHLORIDE SERPL-SCNC: 106 MMOL/L (ref 100–108)
CHLORIDE SERPL-SCNC: 109 MMOL/L (ref 100–108)
CHLORIDE SERPL-SCNC: 110 MMOL/L (ref 100–108)
CLARITY UR: ABNORMAL
CO2 SERPL-SCNC: 20 MMOL/L (ref 21–32)
CO2 SERPL-SCNC: 22 MMOL/L (ref 21–32)
CO2 SERPL-SCNC: 24 MMOL/L (ref 21–32)
COARSE GRAN CASTS URNS QL MICRO: ABNORMAL /LPF
COLOR UR: ABNORMAL
CREAT SERPL-MCNC: 0.83 MG/DL (ref 0.6–1.3)
CREAT SERPL-MCNC: 1.03 MG/DL (ref 0.6–1.3)
CREAT SERPL-MCNC: 1.21 MG/DL (ref 0.6–1.3)
EOSINOPHIL # BLD MANUAL: 0.09 THOUSAND/UL (ref 0–0.4)
EOSINOPHIL NFR BLD MANUAL: 1 % (ref 0–6)
ERYTHROCYTE [DISTWIDTH] IN BLOOD BY AUTOMATED COUNT: 16.9 % (ref 11.6–15.1)
FINE GRAN CASTS URNS QL MICRO: ABNORMAL /LPF
GFR SERPL CREATININE-BSD FRML MDRD: 52 ML/MIN/1.73SQ M
GFR SERPL CREATININE-BSD FRML MDRD: 64 ML/MIN/1.73SQ M
GFR SERPL CREATININE-BSD FRML MDRD: 82 ML/MIN/1.73SQ M
GLUCOSE SERPL-MCNC: 102 MG/DL (ref 65–140)
GLUCOSE SERPL-MCNC: 175 MG/DL (ref 65–140)
GLUCOSE SERPL-MCNC: 179 MG/DL (ref 65–140)
GLUCOSE UR STRIP-MCNC: ABNORMAL MG/DL
HCO3 BLDA-SCNC: 17.1 MMOL/L (ref 22–28)
HCO3 BLDA-SCNC: 18.8 MMOL/L (ref 22–28)
HCO3 BLDA-SCNC: 21.4 MMOL/L (ref 22–28)
HCT VFR BLD AUTO: 27.2 % (ref 34.8–46.1)
HGB BLD-MCNC: 9.1 G/DL (ref 11.5–15.4)
HGB UR QL STRIP.AUTO: ABNORMAL
HOROWITZ INDEX BLDA+IHG-RTO: 100 MM[HG]
KETONES UR STRIP-MCNC: ABNORMAL MG/DL
L PNEUMO1 AG UR QL IA.RAPID: POSITIVE
LACTATE SERPL-SCNC: 1.7 MMOL/L (ref 0.5–2)
LACTATE SERPL-SCNC: 2.2 MMOL/L (ref 0.5–2)
LEUKOCYTE ESTERASE UR QL STRIP: NEGATIVE
LG PLATELETS BLD QL SMEAR: PRESENT
LYMPHOCYTES # BLD AUTO: 0.84 THOUSAND/UL (ref 0.6–4.47)
LYMPHOCYTES # BLD AUTO: 9 % (ref 14–44)
MAGNESIUM SERPL-MCNC: 1.5 MG/DL (ref 1.6–2.6)
MAGNESIUM SERPL-MCNC: 2.8 MG/DL (ref 1.6–2.6)
MAGNESIUM SERPL-MCNC: 2.8 MG/DL (ref 1.6–2.6)
MCH RBC QN AUTO: 29.9 PG (ref 26.8–34.3)
MCHC RBC AUTO-ENTMCNC: 33.1 G/DL (ref 31.4–37.4)
MCV RBC AUTO: 90 FL (ref 82–98)
METAMYELOCYTES NFR BLD MANUAL: 1 % (ref 0–1)
MONOCYTES # BLD AUTO: 0.37 THOUSAND/UL (ref 0–1.22)
MONOCYTES NFR BLD: 4 % (ref 4–12)
MYELOCYTES NFR BLD MANUAL: 2 % (ref 0–1)
NASAL CANNULA: 3
NEUTROPHILS # BLD MANUAL: 7.74 THOUSAND/UL (ref 1.85–7.62)
NEUTS BAND NFR BLD MANUAL: 22 % (ref 0–8)
NEUTS SEG NFR BLD AUTO: 61 % (ref 43–75)
NITRITE UR QL STRIP: NEGATIVE
NON-SQ EPI CELLS URNS QL MICRO: ABNORMAL /HPF
NRBC BLD AUTO-RTO: 0 /100 WBCS
O2 CT BLDA-SCNC: 12 ML/DL (ref 16–23)
O2 CT BLDA-SCNC: 14.4 ML/DL (ref 16–23)
O2 CT BLDA-SCNC: 15 ML/DL (ref 16–23)
OXYHGB MFR BLDA: 94.8 % (ref 94–97)
OXYHGB MFR BLDA: 96.5 % (ref 94–97)
OXYHGB MFR BLDA: 98.8 % (ref 94–97)
P AXIS: 61 DEGREES
P AXIS: 62 DEGREES
PCO2 BLDA: 30.6 MM HG (ref 36–44)
PCO2 BLDA: 31 MM HG (ref 36–44)
PCO2 BLDA: 35.7 MM HG (ref 36–44)
PEEP RESPIRATORY: 10 CM[H2O]
PH BLDA: 7.34 [PH] (ref 7.35–7.45)
PH BLDA: 7.37 [PH] (ref 7.35–7.45)
PH BLDA: 7.46 [PH] (ref 7.35–7.45)
PH UR STRIP.AUTO: 5.5 [PH]
PHOSPHATE SERPL-MCNC: 2.6 MG/DL (ref 2.7–4.5)
PLATELET # BLD AUTO: 104 THOUSANDS/UL (ref 149–390)
PLATELET # BLD AUTO: 98 THOUSANDS/UL (ref 149–390)
PLATELET BLD QL SMEAR: ABNORMAL
PMV BLD AUTO: 12.6 FL (ref 8.9–12.7)
PMV BLD AUTO: 13.2 FL (ref 8.9–12.7)
PO2 BLDA: 111.1 MM HG (ref 75–129)
PO2 BLDA: 363 MM HG (ref 75–129)
PO2 BLDA: 80.8 MM HG (ref 75–129)
POTASSIUM SERPL-SCNC: 2.9 MMOL/L (ref 3.5–5.3)
POTASSIUM SERPL-SCNC: 4 MMOL/L (ref 3.5–5.3)
POTASSIUM SERPL-SCNC: 4.1 MMOL/L (ref 3.5–5.3)
PR INTERVAL: 128 MS
PR INTERVAL: 136 MS
PROCALCITONIN SERPL-MCNC: 6.76 NG/ML
PROT SERPL-MCNC: 5 G/DL (ref 6.4–8.2)
PROT UR STRIP-MCNC: >=300 MG/DL
PS CM H2O: 5
PS VENT FIO2: 40
PS VENT PEEP: 5
QRS AXIS: 72 DEGREES
QRS AXIS: 72 DEGREES
QRSD INTERVAL: 74 MS
QRSD INTERVAL: 76 MS
QT INTERVAL: 406 MS
QT INTERVAL: 420 MS
QTC INTERVAL: 462 MS
QTC INTERVAL: 507 MS
RBC # BLD AUTO: 3.01 MILLION/UL (ref 3.81–5.12)
RBC #/AREA URNS AUTO: ABNORMAL /HPF
S PNEUM AG UR QL: NEGATIVE
SODIUM SERPL-SCNC: 140 MMOL/L (ref 136–145)
SODIUM SERPL-SCNC: 140 MMOL/L (ref 136–145)
SODIUM SERPL-SCNC: 143 MMOL/L (ref 136–145)
SP GR UR STRIP.AUTO: 1.02 (ref 1–1.03)
SPECIMEN SOURCE: ABNORMAL
T WAVE AXIS: 19 DEGREES
T WAVE AXIS: 52 DEGREES
TOTAL CELLS COUNTED SPEC: 100
UROBILINOGEN UR QL STRIP.AUTO: 0.2 E.U./DL
VENT - PS: ABNORMAL
VENT AC: 16
VENT- AC: AC
VENTRICULAR RATE: 73 BPM
VENTRICULAR RATE: 94 BPM
VT SETTING VENT: 350 ML
WBC # BLD AUTO: 9.32 THOUSAND/UL (ref 4.31–10.16)
WBC #/AREA URNS AUTO: ABNORMAL /HPF

## 2020-07-25 PROCEDURE — 80048 BASIC METABOLIC PNL TOTAL CA: CPT | Performed by: NURSE PRACTITIONER

## 2020-07-25 PROCEDURE — 93005 ELECTROCARDIOGRAM TRACING: CPT

## 2020-07-25 PROCEDURE — 82330 ASSAY OF CALCIUM: CPT | Performed by: PHYSICIAN ASSISTANT

## 2020-07-25 PROCEDURE — NC001 PR NO CHARGE: Performed by: STUDENT IN AN ORGANIZED HEALTH CARE EDUCATION/TRAINING PROGRAM

## 2020-07-25 PROCEDURE — 99291 CRITICAL CARE FIRST HOUR: CPT | Performed by: STUDENT IN AN ORGANIZED HEALTH CARE EDUCATION/TRAINING PROGRAM

## 2020-07-25 PROCEDURE — 93010 ELECTROCARDIOGRAM REPORT: CPT | Performed by: INTERNAL MEDICINE

## 2020-07-25 PROCEDURE — 80053 COMPREHEN METABOLIC PANEL: CPT | Performed by: PHYSICIAN ASSISTANT

## 2020-07-25 PROCEDURE — 87081 CULTURE SCREEN ONLY: CPT | Performed by: PHYSICIAN ASSISTANT

## 2020-07-25 PROCEDURE — 85007 BL SMEAR W/DIFF WBC COUNT: CPT | Performed by: PHYSICIAN ASSISTANT

## 2020-07-25 PROCEDURE — 94664 DEMO&/EVAL PT USE INHALER: CPT

## 2020-07-25 PROCEDURE — 94760 N-INVAS EAR/PLS OXIMETRY 1: CPT

## 2020-07-25 PROCEDURE — 80048 BASIC METABOLIC PNL TOTAL CA: CPT | Performed by: STUDENT IN AN ORGANIZED HEALTH CARE EDUCATION/TRAINING PROGRAM

## 2020-07-25 PROCEDURE — 82805 BLOOD GASES W/O2 SATURATION: CPT | Performed by: NURSE PRACTITIONER

## 2020-07-25 PROCEDURE — 71250 CT THORAX DX C-: CPT

## 2020-07-25 PROCEDURE — 96367 TX/PROPH/DG ADDL SEQ IV INF: CPT

## 2020-07-25 PROCEDURE — 84100 ASSAY OF PHOSPHORUS: CPT | Performed by: PHYSICIAN ASSISTANT

## 2020-07-25 PROCEDURE — 94002 VENT MGMT INPAT INIT DAY: CPT

## 2020-07-25 PROCEDURE — 85049 AUTOMATED PLATELET COUNT: CPT | Performed by: PHYSICIAN ASSISTANT

## 2020-07-25 PROCEDURE — 82330 ASSAY OF CALCIUM: CPT | Performed by: NURSE PRACTITIONER

## 2020-07-25 PROCEDURE — 96375 TX/PRO/DX INJ NEW DRUG ADDON: CPT

## 2020-07-25 PROCEDURE — 03HY32Z INSERTION OF MONITORING DEVICE INTO UPPER ARTERY, PERCUTANEOUS APPROACH: ICD-10-PCS | Performed by: STUDENT IN AN ORGANIZED HEALTH CARE EDUCATION/TRAINING PROGRAM

## 2020-07-25 PROCEDURE — 36620 INSERTION CATHETER ARTERY: CPT | Performed by: PHYSICIAN ASSISTANT

## 2020-07-25 PROCEDURE — 83605 ASSAY OF LACTIC ACID: CPT | Performed by: PHYSICIAN ASSISTANT

## 2020-07-25 PROCEDURE — 83735 ASSAY OF MAGNESIUM: CPT | Performed by: PHYSICIAN ASSISTANT

## 2020-07-25 PROCEDURE — 85027 COMPLETE CBC AUTOMATED: CPT | Performed by: PHYSICIAN ASSISTANT

## 2020-07-25 PROCEDURE — 5A1935Z RESPIRATORY VENTILATION, LESS THAN 24 CONSECUTIVE HOURS: ICD-10-PCS | Performed by: STUDENT IN AN ORGANIZED HEALTH CARE EDUCATION/TRAINING PROGRAM

## 2020-07-25 PROCEDURE — 87449 NOS EACH ORGANISM AG IA: CPT | Performed by: PHYSICIAN ASSISTANT

## 2020-07-25 PROCEDURE — 81001 URINALYSIS AUTO W/SCOPE: CPT | Performed by: EMERGENCY MEDICINE

## 2020-07-25 PROCEDURE — 87205 SMEAR GRAM STAIN: CPT | Performed by: PHYSICIAN ASSISTANT

## 2020-07-25 PROCEDURE — 0BH18EZ INSERTION OF ENDOTRACHEAL AIRWAY INTO TRACHEA, VIA NATURAL OR ARTIFICIAL OPENING ENDOSCOPIC: ICD-10-PCS | Performed by: STUDENT IN AN ORGANIZED HEALTH CARE EDUCATION/TRAINING PROGRAM

## 2020-07-25 PROCEDURE — 83735 ASSAY OF MAGNESIUM: CPT | Performed by: NURSE PRACTITIONER

## 2020-07-25 PROCEDURE — 94640 AIRWAY INHALATION TREATMENT: CPT

## 2020-07-25 PROCEDURE — 82805 BLOOD GASES W/O2 SATURATION: CPT | Performed by: EMERGENCY MEDICINE

## 2020-07-25 PROCEDURE — 94669 MECHANICAL CHEST WALL OSCILL: CPT

## 2020-07-25 PROCEDURE — 36600 WITHDRAWAL OF ARTERIAL BLOOD: CPT

## 2020-07-25 PROCEDURE — 82805 BLOOD GASES W/O2 SATURATION: CPT | Performed by: PHYSICIAN ASSISTANT

## 2020-07-25 PROCEDURE — 31500 INSERT EMERGENCY AIRWAY: CPT

## 2020-07-25 PROCEDURE — 87070 CULTURE OTHR SPECIMN AEROBIC: CPT | Performed by: PHYSICIAN ASSISTANT

## 2020-07-25 PROCEDURE — 74176 CT ABD & PELVIS W/O CONTRAST: CPT

## 2020-07-25 RX ORDER — GUAIFENESIN 600 MG
600 TABLET, EXTENDED RELEASE 12 HR ORAL EVERY 12 HOURS SCHEDULED
Status: DISCONTINUED | OUTPATIENT
Start: 2020-07-25 | End: 2020-07-31

## 2020-07-25 RX ORDER — VANCOMYCIN HYDROCHLORIDE 1 G/200ML
12.5 INJECTION, SOLUTION INTRAVENOUS EVERY 12 HOURS
Status: DISCONTINUED | OUTPATIENT
Start: 2020-07-25 | End: 2020-07-25 | Stop reason: DRUGHIGH

## 2020-07-25 RX ORDER — LEVALBUTEROL 1.25 MG/.5ML
1.25 SOLUTION, CONCENTRATE RESPIRATORY (INHALATION)
Status: DISCONTINUED | OUTPATIENT
Start: 2020-07-25 | End: 2020-08-04 | Stop reason: HOSPADM

## 2020-07-25 RX ORDER — CHLORHEXIDINE GLUCONATE 0.12 MG/ML
15 RINSE ORAL EVERY 12 HOURS SCHEDULED
Status: DISCONTINUED | OUTPATIENT
Start: 2020-07-25 | End: 2020-08-04 | Stop reason: HOSPADM

## 2020-07-25 RX ORDER — CALCIUM GLUCONATE 20 MG/ML
1 INJECTION, SOLUTION INTRAVENOUS ONCE
Status: COMPLETED | OUTPATIENT
Start: 2020-07-25 | End: 2020-07-26

## 2020-07-25 RX ORDER — HEPARIN SODIUM 5000 [USP'U]/ML
5000 INJECTION, SOLUTION INTRAVENOUS; SUBCUTANEOUS EVERY 8 HOURS SCHEDULED
Status: DISCONTINUED | OUTPATIENT
Start: 2020-07-25 | End: 2020-08-04 | Stop reason: HOSPADM

## 2020-07-25 RX ORDER — THIAMINE MONONITRATE (VIT B1) 100 MG
100 TABLET ORAL DAILY
Status: DISCONTINUED | OUTPATIENT
Start: 2020-07-26 | End: 2020-08-04 | Stop reason: HOSPADM

## 2020-07-25 RX ORDER — SODIUM CHLORIDE, SODIUM GLUCONATE, SODIUM ACETATE, POTASSIUM CHLORIDE, MAGNESIUM CHLORIDE, SODIUM PHOSPHATE, DIBASIC, AND POTASSIUM PHOSPHATE .53; .5; .37; .037; .03; .012; .00082 G/100ML; G/100ML; G/100ML; G/100ML; G/100ML; G/100ML; G/100ML
1000 INJECTION, SOLUTION INTRAVENOUS ONCE
Status: COMPLETED | OUTPATIENT
Start: 2020-07-25 | End: 2020-07-25

## 2020-07-25 RX ORDER — LEVALBUTEROL 1.25 MG/.5ML
SOLUTION, CONCENTRATE RESPIRATORY (INHALATION)
Status: COMPLETED
Start: 2020-07-25 | End: 2020-07-25

## 2020-07-25 RX ORDER — SODIUM CHLORIDE, SODIUM GLUCONATE, SODIUM ACETATE, POTASSIUM CHLORIDE, MAGNESIUM CHLORIDE, SODIUM PHOSPHATE, DIBASIC, AND POTASSIUM PHOSPHATE .53; .5; .37; .037; .03; .012; .00082 G/100ML; G/100ML; G/100ML; G/100ML; G/100ML; G/100ML; G/100ML
500 INJECTION, SOLUTION INTRAVENOUS ONCE
Status: DISCONTINUED | OUTPATIENT
Start: 2020-07-25 | End: 2020-07-29

## 2020-07-25 RX ORDER — LEVALBUTEROL 1.25 MG/.5ML
1.25 SOLUTION, CONCENTRATE RESPIRATORY (INHALATION) EVERY 8 HOURS PRN
Status: DISCONTINUED | OUTPATIENT
Start: 2020-07-25 | End: 2020-07-25

## 2020-07-25 RX ORDER — NICOTINE 21 MG/24HR
21 PATCH, TRANSDERMAL 24 HOURS TRANSDERMAL DAILY
Status: DISCONTINUED | OUTPATIENT
Start: 2020-07-25 | End: 2020-08-04 | Stop reason: HOSPADM

## 2020-07-25 RX ORDER — FENTANYL CITRATE 50 UG/ML
50 INJECTION, SOLUTION INTRAMUSCULAR; INTRAVENOUS
Status: DISCONTINUED | OUTPATIENT
Start: 2020-07-25 | End: 2020-07-25

## 2020-07-25 RX ORDER — PROPOFOL 10 MG/ML
5-50 INJECTION, EMULSION INTRAVENOUS
Status: DISCONTINUED | OUTPATIENT
Start: 2020-07-25 | End: 2020-07-25

## 2020-07-25 RX ORDER — CALCIUM GLUCONATE 20 MG/ML
2 INJECTION, SOLUTION INTRAVENOUS ONCE
Status: COMPLETED | OUTPATIENT
Start: 2020-07-25 | End: 2020-07-25

## 2020-07-25 RX ORDER — MAGNESIUM SULFATE HEPTAHYDRATE 40 MG/ML
2 INJECTION, SOLUTION INTRAVENOUS ONCE
Status: COMPLETED | OUTPATIENT
Start: 2020-07-25 | End: 2020-07-25

## 2020-07-25 RX ORDER — ALBUMIN, HUMAN INJ 5% 5 %
25 SOLUTION INTRAVENOUS ONCE
Status: COMPLETED | OUTPATIENT
Start: 2020-07-25 | End: 2020-07-25

## 2020-07-25 RX ORDER — SODIUM CHLORIDE, SODIUM GLUCONATE, SODIUM ACETATE, POTASSIUM CHLORIDE, MAGNESIUM CHLORIDE, SODIUM PHOSPHATE, DIBASIC, AND POTASSIUM PHOSPHATE .53; .5; .37; .037; .03; .012; .00082 G/100ML; G/100ML; G/100ML; G/100ML; G/100ML; G/100ML; G/100ML
75 INJECTION, SOLUTION INTRAVENOUS CONTINUOUS
Status: DISCONTINUED | OUTPATIENT
Start: 2020-07-25 | End: 2020-07-28

## 2020-07-25 RX ORDER — FENTANYL CITRATE-0.9 % NACL/PF 10 MCG/ML
50 PLASTIC BAG, INJECTION (ML) INTRAVENOUS CONTINUOUS
Status: DISCONTINUED | OUTPATIENT
Start: 2020-07-25 | End: 2020-07-25

## 2020-07-25 RX ORDER — ATORVASTATIN CALCIUM 10 MG/1
10 TABLET, FILM COATED ORAL DAILY
Status: DISCONTINUED | OUTPATIENT
Start: 2020-07-25 | End: 2020-08-04 | Stop reason: HOSPADM

## 2020-07-25 RX ORDER — POTASSIUM CHLORIDE 20MEQ/15ML
40 LIQUID (ML) ORAL
Status: COMPLETED | OUTPATIENT
Start: 2020-07-25 | End: 2020-07-25

## 2020-07-25 RX ORDER — POTASSIUM CHLORIDE 14.9 MG/ML
20 INJECTION INTRAVENOUS ONCE
Status: COMPLETED | OUTPATIENT
Start: 2020-07-25 | End: 2020-07-25

## 2020-07-25 RX ORDER — FENTANYL CITRATE 50 UG/ML
50 INJECTION, SOLUTION INTRAMUSCULAR; INTRAVENOUS ONCE
Status: COMPLETED | OUTPATIENT
Start: 2020-07-25 | End: 2020-07-25

## 2020-07-25 RX ORDER — ALBUTEROL SULFATE 2.5 MG/3ML
2.5 SOLUTION RESPIRATORY (INHALATION) EVERY 4 HOURS PRN
Status: DISCONTINUED | OUTPATIENT
Start: 2020-07-25 | End: 2020-07-25

## 2020-07-25 RX ORDER — ALBUTEROL SULFATE 2.5 MG/3ML
2.5 SOLUTION RESPIRATORY (INHALATION)
Status: DISCONTINUED | OUTPATIENT
Start: 2020-07-25 | End: 2020-07-25

## 2020-07-25 RX ORDER — CHLORHEXIDINE GLUCONATE 0.12 MG/ML
15 RINSE ORAL EVERY 12 HOURS SCHEDULED
Status: DISCONTINUED | OUTPATIENT
Start: 2020-07-25 | End: 2020-07-25 | Stop reason: SDUPTHER

## 2020-07-25 RX ORDER — LANOLIN ALCOHOL/MO/W.PET/CERES
400 CREAM (GRAM) TOPICAL DAILY
Status: DISCONTINUED | OUTPATIENT
Start: 2020-07-26 | End: 2020-08-04 | Stop reason: HOSPADM

## 2020-07-25 RX ADMIN — VANCOMYCIN HYDROCHLORIDE 750 MG: 750 INJECTION, SOLUTION INTRAVENOUS at 21:16

## 2020-07-25 RX ADMIN — SODIUM CHLORIDE, SODIUM GLUCONATE, SODIUM ACETATE, POTASSIUM CHLORIDE, MAGNESIUM CHLORIDE, SODIUM PHOSPHATE, DIBASIC, AND POTASSIUM PHOSPHATE 75 ML/HR: .53; .5; .37; .037; .03; .012; .00082 INJECTION, SOLUTION INTRAVENOUS at 02:24

## 2020-07-25 RX ADMIN — POTASSIUM CHLORIDE 20 MEQ: 200 INJECTION, SOLUTION INTRAVENOUS at 06:37

## 2020-07-25 RX ADMIN — SODIUM CHLORIDE, SODIUM GLUCONATE, SODIUM ACETATE, POTASSIUM CHLORIDE, MAGNESIUM CHLORIDE, SODIUM PHOSPHATE, DIBASIC, AND POTASSIUM PHOSPHATE 125 ML/HR: .53; .5; .37; .037; .03; .012; .00082 INJECTION, SOLUTION INTRAVENOUS at 12:17

## 2020-07-25 RX ADMIN — SODIUM CHLORIDE, SODIUM GLUCONATE, SODIUM ACETATE, POTASSIUM CHLORIDE, MAGNESIUM CHLORIDE, SODIUM PHOSPHATE, DIBASIC, AND POTASSIUM PHOSPHATE 1000 ML: .53; .5; .37; .037; .03; .012; .00082 INJECTION, SOLUTION INTRAVENOUS at 16:22

## 2020-07-25 RX ADMIN — VANCOMYCIN HYDROCHLORIDE 750 MG: 750 INJECTION, SOLUTION INTRAVENOUS at 10:09

## 2020-07-25 RX ADMIN — FENTANYL CITRATE 50 MCG: 50 INJECTION INTRAMUSCULAR; INTRAVENOUS at 02:21

## 2020-07-25 RX ADMIN — IPRATROPIUM BROMIDE 0.5 MG: 0.5 SOLUTION RESPIRATORY (INHALATION) at 20:01

## 2020-07-25 RX ADMIN — POTASSIUM CHLORIDE 40 MEQ: 20 SOLUTION ORAL at 08:43

## 2020-07-25 RX ADMIN — NOREPINEPHRINE BITARTRATE 5 MCG/MIN: 1 INJECTION INTRAVENOUS at 01:12

## 2020-07-25 RX ADMIN — PROPOFOL 20 MCG/KG/MIN: 10 INJECTION, EMULSION INTRAVENOUS at 06:49

## 2020-07-25 RX ADMIN — FENTANYL CITRATE 50 MCG: 50 INJECTION INTRAMUSCULAR; INTRAVENOUS at 01:49

## 2020-07-25 RX ADMIN — LEVALBUTEROL 1.25 MG: 1.25 SOLUTION, CONCENTRATE RESPIRATORY (INHALATION) at 15:44

## 2020-07-25 RX ADMIN — HEPARIN SODIUM 5000 UNITS: 5000 INJECTION INTRAVENOUS; SUBCUTANEOUS at 05:14

## 2020-07-25 RX ADMIN — ALBUMIN (HUMAN) 25 G: 12.5 INJECTION, SOLUTION INTRAVENOUS at 08:40

## 2020-07-25 RX ADMIN — CALCIUM GLUCONATE 2 G: 20 INJECTION, SOLUTION INTRAVENOUS at 08:58

## 2020-07-25 RX ADMIN — FENTANYL CITRATE 50 MCG/HR: 50 INJECTION, SOLUTION INTRAMUSCULAR; INTRAVENOUS at 01:29

## 2020-07-25 RX ADMIN — CALCIUM GLUCONATE 1 G: 20 INJECTION, SOLUTION INTRAVENOUS at 21:58

## 2020-07-25 RX ADMIN — PROPOFOL 40 MCG/KG/MIN: 10 INJECTION, EMULSION INTRAVENOUS at 02:23

## 2020-07-25 RX ADMIN — NICOTINE 21 MG: 21 PATCH TRANSDERMAL at 08:43

## 2020-07-25 RX ADMIN — CEFEPIME HYDROCHLORIDE 2000 MG: 2 INJECTION, POWDER, FOR SOLUTION INTRAVENOUS at 20:49

## 2020-07-25 RX ADMIN — LEVALBUTEROL HYDROCHLORIDE 1.25 MG: 1.25 SOLUTION, CONCENTRATE RESPIRATORY (INHALATION) at 20:01

## 2020-07-25 RX ADMIN — HEPARIN SODIUM 5000 UNITS: 5000 INJECTION INTRAVENOUS; SUBCUTANEOUS at 02:34

## 2020-07-25 RX ADMIN — MAGNESIUM SULFATE IN WATER 2 G: 40 INJECTION, SOLUTION INTRAVENOUS at 08:46

## 2020-07-25 RX ADMIN — HEPARIN SODIUM 5000 UNITS: 5000 INJECTION INTRAVENOUS; SUBCUTANEOUS at 21:58

## 2020-07-25 RX ADMIN — LEVALBUTEROL HYDROCHLORIDE 1.25 MG: 1.25 SOLUTION, CONCENTRATE RESPIRATORY (INHALATION) at 15:44

## 2020-07-25 RX ADMIN — POTASSIUM CHLORIDE 40 MEQ: 20 SOLUTION ORAL at 06:28

## 2020-07-25 RX ADMIN — AZITHROMYCIN MONOHYDRATE 500 MG: 500 INJECTION, POWDER, LYOPHILIZED, FOR SOLUTION INTRAVENOUS at 03:22

## 2020-07-25 RX ADMIN — PROPOFOL 30 MCG/KG/MIN: 10 INJECTION, EMULSION INTRAVENOUS at 10:08

## 2020-07-25 RX ADMIN — CEFEPIME HYDROCHLORIDE 2000 MG: 2 INJECTION, POWDER, FOR SOLUTION INTRAVENOUS at 10:09

## 2020-07-25 RX ADMIN — CALCIUM GLUCONATE 2 G: 20 INJECTION, SOLUTION INTRAVENOUS at 14:58

## 2020-07-25 RX ADMIN — NOREPINEPHRINE BITARTRATE 8 MCG/MIN: 1 INJECTION INTRAVENOUS at 12:15

## 2020-07-25 RX ADMIN — CHLORHEXIDINE GLUCONATE 0.12% ORAL RINSE 15 ML: 1.2 LIQUID ORAL at 02:34

## 2020-07-25 RX ADMIN — SODIUM CHLORIDE, SODIUM GLUCONATE, SODIUM ACETATE, POTASSIUM CHLORIDE, MAGNESIUM CHLORIDE, SODIUM PHOSPHATE, DIBASIC, AND POTASSIUM PHOSPHATE 125 ML/HR: .53; .5; .37; .037; .03; .012; .00082 INJECTION, SOLUTION INTRAVENOUS at 19:32

## 2020-07-25 RX ADMIN — HEPARIN SODIUM 5000 UNITS: 5000 INJECTION INTRAVENOUS; SUBCUTANEOUS at 14:59

## 2020-07-25 RX ADMIN — CHLORHEXIDINE GLUCONATE 0.12% ORAL RINSE 15 ML: 1.2 LIQUID ORAL at 08:43

## 2020-07-25 RX ADMIN — IPRATROPIUM BROMIDE 0.5 MG: 0.5 SOLUTION RESPIRATORY (INHALATION) at 15:43

## 2020-07-25 RX ADMIN — METRONIDAZOLE 500 MG: 500 INJECTION, SOLUTION INTRAVENOUS at 11:48

## 2020-07-25 NOTE — ASSESSMENT & PLAN NOTE
· Suspect prerenal in setting of severe sepsis  · Continue aggressive IV fluid hydration  · Maintain Matt for strict I&Os  · Trend daily BUN/creatinine

## 2020-07-25 NOTE — ASSESSMENT & PLAN NOTE
· Noted by fever, tachycardia, tachypnea, elevated lactic acid  · Broad-spectrum antibiotics as noted above  · Follow-up sputum/blood cultures  · Trend procalcitonin  · Monitor fever curve and WBC count

## 2020-07-25 NOTE — RESPIRATORY THERAPY NOTE
Pt intubated due to declining respiratory status  8 0 hi-lo ETT placed in 1 attempt w/o incident  ETT secured w/ commercial tube mix at 23cm  Placement confirmed via equal bilateral BS and chest rise, colorimetric detector, and CXR  Pt bagged to CT and subsequently to ICU  Placed on Drager in ICU  AC/VC+, 16bpm/350mL/100%/+[id number]  Pt resting comfortably at this time

## 2020-07-25 NOTE — H&P
Progress Note - Ginny Led 1970, 48 y o  female MRN: [id number]    Unit/Bed#:  Encounter: [id number]    Primary Care Provider: Waleska Dale MD   Date and time admitted to hospital: 7/24/2020  9:08 PM        * Acute respiratory failure with hypoxia ([facility] )  Assessment & Plan  · Acute hypoxic respiratory failure in setting of profound left upper lobe pneumonia  · Continue mechanical ventilation lung protective volumes  Maintain O2 saturation >92%  · Daily SAT/SBT  · VAP Bundle  · Antibiotic plan as noted below  Follow-up sputum cultures  Deescalate antibiotics as able  · Analgesia/sedation:  Fentanyl 50 mcg/hour  Propofol GTT to achieve goal RASS 0 to -2  Left upper lobe pneumonia  Assessment & Plan  · Continue broad-spectrum antibiotics; cefepime/vancomycin/azithromycin antibiotic day 1   · Follow-up sputum culture  Deescalate antibiotics as able  · Check strep Legionella urine antigens, and MRSA  · Trend procalcitonin  Severe sepsis ([facility] )  Assessment & Plan  · Noted by fever, tachycardia, tachypnea, elevated lactic acid  · Broad-spectrum antibiotics as noted above  · Follow-up sputum/blood cultures  · Trend procalcitonin  · Monitor fever curve and WBC count  Altered mental status  Assessment & Plan  · Suspect toxic metabolic  · CT head negative for acute intracranial abnormality  · Frequent sedation breaks/neuro checks  · Delirium precautions  Regulate sleep-wake cycles  Daily CAM ICU  Lactic acidosis  Assessment & Plan  · Continue IV fluid hydration  · Trend end points to ensure clearance  ABELARDO (acute kidney injury) ([facility] )  Assessment & Plan  · Suspect prerenal in setting of severe sepsis  · Continue aggressive IV fluid hydration  · Maintain Matt for strict I&Os  · Trend daily BUN/creatinine  Hypokalemia  Assessment & Plan  · Goal potassium >4 0   · Replete electrolytes as necessary      Hyperlipidemia  Assessment & Plan  · Atorvastatin 10 mg daily       -------------------------------------------------------------------------------------------------------------  Chief Complaint:  Shortness of breath    History of Present Illness   HX and PE limited by: Kevin Cardona is a 48 y o  female who presented to [facility] ED 7/24/20 for evaluation of shortness of breath x 1 week  Patient has significant past medical history of hyperlipidemia, tobacco abuse, and chronic alcohol use  She was seen and evaluated in the emergency department 3 days prior for dehydration in setting of diarrhea and vomiting  Today she returns with chief complaint of shortness of breath  This is associated with productive cough and increased sputum production  She denies fever however does endorse chills  Patient intermittently confused and altered per   Patient works at [employer], however cannot specifically identify any sick contacts  She denies recent travel  She is a current everyday smoker however has not smoked in last began to have secondary to shortness of breath  Upon arrival to the emergency department patient was noted to be febrile 102 6, tachycardic, tachypneic and hypoxic  Chest x-ray revealed significant left upper lobe pneumonia  COVID swab was negative  Patient was started on broad-spectrum antibiotics and aggressively resuscitated  Upon critical care evaluation patient noted to be intermittently altered, diaphoretic, tachypneic with increased work of breathing  The ultimate decision was made to intubate patient  Patient was intubated in the emergency department by ED physician, please see note  She was subsequently admitted to ICU for further medical management and evaluation  History obtained from chart review and the patient   -------------------------------------------------------------------------------------------------------------  Dispo:  Admit to Critical Care     Code Status: Level 1 - Full Code  --------------------------------------------------------------------------------------------------------------  Review of Systems   Constitutional: Positive for appetite change, chills and diaphoresis  Negative for fever  HENT: Positive for congestion  Respiratory: Positive for cough and shortness of breath  Negative for chest tightness  Gastrointestinal: Positive for diarrhea and nausea  Genitourinary: Negative  Musculoskeletal: Negative  Neurological: Negative  Physical Exam   Constitutional:   Lethargic, ill-appearing female   Eyes: Pupils are equal, round, and reactive to light  EOM are normal    Neck: Normal range of motion  Neck supple  Cardiovascular: Normal rate and regular rhythm  Pulmonary/Chest: She is in respiratory distress  Rhonchorous left-sided breath sounds  Abdominal: Soft  Bowel sounds are normal  She exhibits no distension and no mass  There is no guarding  Neurological: She is alert  Intermittently oriented to person and place  Post intubation patient able to follow commands appropriately, moves all 4 extremities without difficulty   Skin: Skin is warm  She is diaphoretic  No erythema  Vitals reviewed  --------------------------------------------------------------------------------------------------------------  Vitals:   Vitals:    07/24/20 2200 07/24/20 2300 07/25/20 0000 07/25/20 0135   BP: 112/67 126/74 101/60    BP Location: Right arm Right arm Right arm    Pulse: (!) 144 (!) 145 (!) 129    Resp: (!) 37 (!) 64 (!) 36    Temp:   99 5 °F (37 5 °C)    TempSrc:   Oral    SpO2: 97% 94% 94% 100%   Height:         Temp  Min: 99 5 °F (37 5 °C)  Max: 102 9 °F (39 4 °C)  IBW: 59 3 kg  Height: 5' 6" (167 6 cm)  Body mass index is 25 19 kg/m²      Laboratory and Diagnostics:  Results from last 7 days   Lab Units 07/25/20  0250 07/24/20  2118 07/21/20  2120   WBC Thousand/uL  --  14 33* 12 76*   HEMOGLOBIN g/dL  --  12 3 12 3   HEMATOCRIT %  --  36 6 36 2 PLATELETS Thousands/uL 98* 126* 155   NEUTROS PCT %  --   --  81*   BANDS PCT %  --  2  --    MONOS PCT %  --   --  10   MONO PCT %  --  5  --      Results from last 7 days   Lab Units 07/24/20 2118 07/21/20  2120   SODIUM mmol/L 138 136   POTASSIUM mmol/L 2 5* 2 4*   CHLORIDE mmol/L 98* 96*   CO2 mmol/L 27 27   ANION GAP mmol/L 13 13   BUN mg/dL 15 10   CREATININE mg/dL 1 54* 1 30   CALCIUM mg/dL 8 1* 7 4*   GLUCOSE RANDOM mg/dL 128 109   ALT U/L 25 14   AST U/L 132* 28   ALK PHOS U/L 83 112   ALBUMIN g/dL 2 2* 2 5*   TOTAL BILIRUBIN mg/dL 0 80 0 60          Results from last 7 days   Lab Units 07/24/20 2118   INR  1 19   PTT seconds 45*      Results from last 7 days   Lab Units 07/21/20  2239   TROPONIN I ng/mL 0 02     Results from last 7 days   Lab Units 07/25/20  0250 07/24/20  2311 07/24/20  2118   LACTIC ACID mmol/L 2 2* 2 5* 3 0*     ABG:  Results from last 7 days   Lab Units 07/25/20  0250   PH ART  7 339*   PCO2 ART mm Hg 35 7*   PO2 ART mm Hg 363 0*   HCO3 ART mmol/L 18 8*   BASE EXC ART mmol/L -6 3   ABG SOURCE  Line, Arterial     VBG:  Results from last 7 days   Lab Units 07/25/20  0250   ABG SOURCE  Line, Arterial           Micro:        EKG: Sinus tachycardia  Imaging: I have personally reviewed pertinent reports  and I have personally reviewed pertinent films in PACS      Historical Information   No past medical history on file    Past Surgical History:   Procedure Laterality Date    CERVICAL BIOPSY  W/ LOOP ELECTRODE EXCISION       Social History   Social History     Substance and Sexual Activity   Alcohol Use Not Currently    Frequency: 4 or more times a week    Comment: "was drinking daily, stopped saturday"     Social History     Substance and Sexual Activity   Drug Use No     Social History     Tobacco Use   Smoking Status Current Every Day Smoker    Types: Cigarettes   Smokeless Tobacco Never Used     Exercise History: no assistance with daily ADLs  Family History:   Family History Problem Relation Age of Onset    Colon cancer Maternal Grandfather          Medications:  Current Facility-Administered Medications   Medication Dose Route Frequency    atorvastatin (LIPITOR) tablet 10 mg  10 mg Oral Daily    azithromycin (ZITHROMAX) 500 mg in sodium chloride 0 9% 250mL IVPB 500 mg  500 mg Intravenous Q24H    cefepime (MAXIPIME) 2,000 mg in dextrose 5 % 50 mL IVPB  2,000 mg Intravenous Q12H    chlorhexidine (PERIDEX) 0 12 % oral rinse 15 mL  15 mL Swish & Spit Q12H [address]    fentaNYL 1000 mcg in sodium chloride 0 9% 100mL infusion  50 mcg/hr Intravenous Continuous    fentanyl citrate (PF) 100 MCG/2ML 50 mcg  50 mcg Intravenous Q1H PRN    heparin (porcine) subcutaneous injection 5,000 Units  5,000 Units Subcutaneous Q8H [address]    multi-electrolyte (ISOLYTE-S PH 7 4) bolus 500 mL  500 mL Intravenous Once    multi-electrolyte (PLASMALYTE-A/ISOLYTE-S PH 7 4) IV solution  125 mL/hr Intravenous Continuous    nicotine (NICODERM CQ) 21 mg/24 hr TD 24 hr patch 21 mg  21 mg Transdermal Daily    norepinephrine (LEVOPHED) 4 mg (STANDARD CONCENTRATION) IV in sodium chloride 0 9% 250 mL  1-30 mcg/min Intravenous Titrated    propofol (DIPRIVAN) 1000 mg in 100 mL infusion (premix)  5-50 mcg/kg/min Intravenous Titrated    sodium chloride (PF) 0 9 % injection 3 mL  3 mL Intravenous Q1H PRN    vancomycin (VANCOCIN) IVPB (premix) 750 mg 150 mL  10 mg/kg Intravenous Q12H     Home medications:  Prior to Admission Medications   Prescriptions Last Dose Informant Patient Reported?  Taking?   atorvastatin (LIPITOR) 10 mg tablet   No No   Sig: take 1 tablet by mouth once daily   ondansetron (ZOFRAN-ODT) 4 mg disintegrating tablet   No No   Sig: Take 1 tablet (4 mg total) by mouth every 8 (eight) hours as needed for nausea   zolpidem (AMBIEN CR) 12 5 MG CR tablet   No No   Sig: Take 1 tablet (12 5 mg total) by mouth daily at bedtime as needed for sleep      Facility-Administered Medications: None Allergies: Allergies   Allergen Reactions    Amoxil [Amoxicillin] Rash       ------------------------------------------------------------------------------------------------------------  Advance Directive and Living Will:      Power of :    POLST:    ------------------------------------------------------------------------------------------------------------  Anticipated Length of Stay is > 2 midnights    Care Time Delivered:   Upon my evaluation, this patient had a high probability of imminent or life-threatening deterioration due to Severe sepsis, acute hypoxic respiratory failure, left upper lobe pneumonia, which required my direct attention, intervention, and personal management  I have personally provided 35 minutes (0230 to 0430) of critical care time, exclusive of procedures, teaching, family meetings, and any prior time recorded by providers other than myself  Emmy Serrano PA-C        Portions of the record may have been created with voice recognition software  Occasional wrong word or "sound a like" substitutions may have occurred due to the inherent limitations of voice recognition software    Read the chart carefully and recognize, using context, where substitutions have occurred

## 2020-07-25 NOTE — ASSESSMENT & PLAN NOTE
· Continue broad-spectrum antibiotics; cefepime/vancomycin/azithromycin antibiotic day 1   · Follow-up sputum culture  Deescalate antibiotics as able  · Check strep Legionella urine antigens, and MRSA  · Trend procalcitonin

## 2020-07-25 NOTE — ASSESSMENT & PLAN NOTE
· Acute hypoxic respiratory failure in setting of profound left upper lobe pneumonia  · Continue mechanical ventilation lung protective volumes  Maintain O2 saturation >92%  · Daily SAT/SBT  · VAP Bundle  · Antibiotic plan as noted below  Follow-up sputum cultures  Deescalate antibiotics as able  · Analgesia/sedation:  Fentanyl 50 mcg/hour  Propofol GTT to achieve goal RASS 0 to -2

## 2020-07-25 NOTE — ED NOTES
Fentanyl 50mcg IV LAC at 0200 by verbal order Donna Braun RN  07/25/20 [id number] Stephane Saldivar RN  07/25/20 [id number]

## 2020-07-25 NOTE — RESPIRATORY THERAPY NOTE
07/25/20 0735   Vent Information   Vent ID [id number]   Vent type Drager   Drager Vent Mode AC/VC+   $ Pulse Oximetry Spot Check Charge Completed   SpO2 99 %   AC/VC+ Settings   Resp Rate (BPM) 16 BPM   VT (mL) 350 mL   Insp Time (S) 0 9 S   FIO2 (%) 40 %   PEEP (cmH2O) 8 cmH2O   Rise Time (%) 0 2 %   Trigger Sensitivity Flow (LPM) 2 LPM   Humidification Heater   Heater Temp 98 6 °F (37 °C)   AC/VC+ Actuals   Resp Rate (BPM) 24 BPM   VT (mL) 504 mL   MV (Obs) 9 78   MAP (cmH2O) 11 cmH2O   Peak Pressure (cmH2O) 15 cmH2O   I:E Ratio (Obs) 1:2 3   Static Compliance (mL/cmH20) 51 mL/cmH2O   Plateau Pressure (cm H2O) 18 9 cm H2O   Heater Temperature (Obs) 98 4 °F (36 9 °C)   AC/VC+ ALARMS   High Peak Pressure (cmH2O) 40 cmH2O   High Resp Rate (BPM) 40 BPM   High MV (L/min) 12 L/min   Low MV (L/min) 4 L/min   High VT (mL) 800 mL   Maintenance   Alarm (pink) cable attached No   Resuscitation bag with peep valve at bedside Yes   Water bag changed No   Circuit changed No   Daily Screen   Patient safety screen outcome: Failed   Not Ready for Weaning due to: PEEP > 8cmH2O;Underline problem not resolved   IHI Ventilator Associated Pneumonia Bundle   Daily Assessment of Readiness to Extubate Yes   Head of Bed Elevated HOB 30   Oral Care Mouth suctioned   ETT  Cuffed; Hi-Lo 8 mm   Placement Date/Time: 07/25/20 0120   Preoxygenated: Yes  Type: Cuffed; Hi-Lo  Tube Size: 8 mm  Laryngoscope: Mac  Blade Size: 4  Location: Oral  Insertion: Atraumatic  Insertion attempts: 1  Placement Verification: Auscultation  Secured at (cm): 23  Pl       Secured at (cm) 24   Measured from Lips   Secured Location Right   Secured by Commercial tube mix   Site Condition Dry   Cuff Pressure (cm H2O) 29 cm H2O   HI-LO Suction  Continuous low suction   HI-LO Secretions Scant   HI-LO Intervention Patent

## 2020-07-25 NOTE — PROCEDURES
Arterial Line Insertion  Date/Time: 7/25/2020 5:50 AM  Performed by: Jerome Cabello PA-C  Authorized by: Jerome Cabello PA-C     Patient location:  ICU  Other Assisting Provider: No    Consent:     Consent obtained:  Verbal and emergent situation    Consent given by:  Spouse (Spouse Boris)    Risks discussed:  Bleeding, infection, ischemia, pain and repeat procedure  Universal protocol:     Patient identity confirmed:  Arm band and hospital-assigned identification number  Indications:     Indications: hemodynamic monitoring and multiple ABGs    Pre-procedure details:     Skin preparation:  Chlorhexidine  Sedation:     Sedation type: intubated and sedated (propofol/fentanyl)  Anesthesia (see MAR for exact dosages): Anesthesia method:  None  Procedure details:     Location / Tip of Catheter:  Radial    Laterality:  Left    Needle gauge:  20 G    Placement technique:  Seldinger    Number of attempts:  2    Successful placement: yes      Transducer: waveform confirmed    Post-procedure details:     Post-procedure:  Biopatch applied, sterile dressing applied and sutured    CMS:  Normal    Patient tolerance of procedure:   Tolerated well, no immediate complications

## 2020-07-25 NOTE — RESPIRATORY THERAPY NOTE
RT Ventilator Management Note  Zebedee Bal 48 y o  female MRN: [id number]  Unit/Bed#:  Encounter: [id number]      Daily Screen       7/25/2020  0442 7/25/2020  0735          Patient safety screen outcome[de-identified]  Failed  Failed      Not Ready for Weaning due to[de-identified]  PEEP > 8cmH2O;Underline problem not resolved  PEEP > 8cmH2O;Underline problem not resolved              Physical Exam:   Assessment Type: Assess only  General Appearance: Alert, Awake  Respiratory Pattern: Assisted  Chest Assessment: Chest expansion symmetrical  Bilateral Breath Sounds: Diminished, Rhonchi, Coarse  Suction: ET Tube      Resp Comments: pt remains intubated and on full mechanical support  FIO2 titrated to 40% and peep to 8  Skin integrity remains intact    Will continue to monitor

## 2020-07-25 NOTE — ASSESSMENT & PLAN NOTE
· Suspect toxic metabolic  · CT head negative for acute intracranial abnormality  · Frequent sedation breaks/neuro checks  · Delirium precautions  Regulate sleep-wake cycles  Daily CAM ICU

## 2020-07-25 NOTE — ED NOTES
succinylcholine 100mg IV GAMAL Ochoa RN  07/25/20 [place] [address], RN  07/25/20 [place] [address], RN  07/25/20 0384

## 2020-07-25 NOTE — PROGRESS NOTES
Vancomycin Assessment    Marcel Sam is a 48 y o  female who is currently receiving vancomycin 1000mg Iv Q12H for Pneumonia     Relevant clinical data and objective history reviewed:  Creatinine   Date Value Ref Range Status   07/24/2020 1 54 (H) 0 60 - 1 30 mg/dL Final     Comment:     Standardized to IDMS reference method   07/21/2020 1 30 0 60 - 1 30 mg/dL Final     Comment:     Standardized to IDMS reference method   11/13/2018 0 75 0 60 - 1 30 mg/dL Final     Comment:     Standardized to IDMS reference method     /60 (BP Location: Right arm)   Pulse (!) 129   Temp 99 5 °F (37 5 °C) (Oral)   Resp (!) 36   Ht 5' 6" (1 676 m)   SpO2 94%   BMI 25 19 kg/m²   No intake/output data recorded  Lab Results   Component Value Date/Time    BUN 15 07/24/2020 09:18 PM    WBC 14 33 (H) 07/24/2020 09:18 PM    HGB 12 3 07/24/2020 09:18 PM    HCT 36 6 07/24/2020 09:18 PM    MCV 89 07/24/2020 09:18 PM     (L) 07/24/2020 09:18 PM     Temp Readings from Last 3 Encounters:   07/25/20 99 5 °F (37 5 °C) (Oral)   07/21/20 99 7 °F (37 6 °C) (Oral)   02/03/20 98 °F (36 7 °C)     Vancomycin Days of Therapy: 2    Assessment/Plan  The patient is currently on vancomycin utilizing scheduled dosing based on actual body weight  Baseline risks associated with therapy include: pre-existing renal impairment and concomitant nephrotoxic medications  The patient is currently receiving 1000mg Iv Q12H and after clinical evaluation will be changed to 750mg IV Q12H  Pharmacy will also follow closely for s/sx of nephrotoxicity, infusion reactions and appropriateness of therapy  BMP and CBC will be ordered per protocol  Plan for trough as patient approaches steady state, prior to the 4th  dose at approximately 0900 on 7/26/20  Due to infection severity, will target a trough of 15-20 (appropriate for most indications)   Pharmacy will continue to follow the patients culture results and clinical progress daily      Trudi Vidal Bradford Banda, Pharmacist

## 2020-07-25 NOTE — ED NOTES
Intubation begin   Dr Skylar Chávez primary physician; Marv Grijalva; Shahzad Cassidy, RN; Awais Diaz, ED tech; CASPER Chew RN  07/25/20 [address] Berry RN  07/25/20 8429

## 2020-07-26 ENCOUNTER — APPOINTMENT (INPATIENT)
Dept: RADIOLOGY | Facility: HOSPITAL | Age: 50
DRG: 871 | End: 2020-07-26
Payer: COMMERCIAL

## 2020-07-26 ENCOUNTER — APPOINTMENT (INPATIENT)
Dept: NON INVASIVE DIAGNOSTICS | Facility: HOSPITAL | Age: 50
DRG: 871 | End: 2020-07-26
Payer: COMMERCIAL

## 2020-07-26 PROBLEM — E87.6 HYPOKALEMIA: Status: RESOLVED | Noted: 2020-07-25 | Resolved: 2020-07-26

## 2020-07-26 PROBLEM — E78.5 HYPERLIPIDEMIA: Status: RESOLVED | Noted: 2018-11-08 | Resolved: 2020-07-26

## 2020-07-26 PROBLEM — R53.83 FATIGUE: Status: RESOLVED | Noted: 2018-11-08 | Resolved: 2020-07-26

## 2020-07-26 PROBLEM — E87.2 LACTIC ACIDOSIS: Status: RESOLVED | Noted: 2020-07-25 | Resolved: 2020-07-26

## 2020-07-26 PROBLEM — E87.20 LACTIC ACIDOSIS: Status: RESOLVED | Noted: 2020-07-25 | Resolved: 2020-07-26

## 2020-07-26 LAB
ANION GAP SERPL CALCULATED.3IONS-SCNC: 11 MMOL/L (ref 4–13)
ARTERIAL PATENCY WRIST A: YES
BASE EXCESS BLDA CALC-SCNC: -2 MMOL/L
BASOPHILS # BLD AUTO: 0.02 THOUSANDS/ΜL (ref 0–0.1)
BASOPHILS NFR BLD AUTO: 0 % (ref 0–1)
BODY TEMPERATURE: ABNORMAL DEGREES FEHRENHEIT
BUN SERPL-MCNC: 10 MG/DL (ref 5–25)
CALCIUM SERPL-MCNC: 8.2 MG/DL (ref 8.3–10.1)
CHLORIDE SERPL-SCNC: 110 MMOL/L (ref 100–108)
CO2 SERPL-SCNC: 22 MMOL/L (ref 21–32)
CREAT SERPL-MCNC: 0.82 MG/DL (ref 0.6–1.3)
EOSINOPHIL # BLD AUTO: 0 THOUSAND/ΜL (ref 0–0.61)
EOSINOPHIL NFR BLD AUTO: 0 % (ref 0–6)
ERYTHROCYTE [DISTWIDTH] IN BLOOD BY AUTOMATED COUNT: 18.2 % (ref 11.6–15.1)
GFR SERPL CREATININE-BSD FRML MDRD: 84 ML/MIN/1.73SQ M
GLUCOSE SERPL-MCNC: 107 MG/DL (ref 65–140)
GLUCOSE SERPL-MCNC: 107 MG/DL (ref 65–140)
GLUCOSE SERPL-MCNC: 88 MG/DL (ref 65–140)
GLUCOSE SERPL-MCNC: 90 MG/DL (ref 65–140)
GLUCOSE SERPL-MCNC: 91 MG/DL (ref 65–140)
HCO3 BLDA-SCNC: 21 MMOL/L (ref 22–28)
HCT VFR BLD AUTO: 27.5 % (ref 34.8–46.1)
HGB BLD-MCNC: 9 G/DL (ref 11.5–15.4)
IMM GRANULOCYTES # BLD AUTO: 0.11 THOUSAND/UL (ref 0–0.2)
IMM GRANULOCYTES NFR BLD AUTO: 1 % (ref 0–2)
LYMPHOCYTES # BLD AUTO: 1.03 THOUSANDS/ΜL (ref 0.6–4.47)
LYMPHOCYTES NFR BLD AUTO: 11 % (ref 14–44)
MCH RBC QN AUTO: 30.3 PG (ref 26.8–34.3)
MCHC RBC AUTO-ENTMCNC: 32.7 G/DL (ref 31.4–37.4)
MCV RBC AUTO: 93 FL (ref 82–98)
MONOCYTES # BLD AUTO: 0.43 THOUSAND/ΜL (ref 0.17–1.22)
MONOCYTES NFR BLD AUTO: 5 % (ref 4–12)
MRSA NOSE QL CULT: NORMAL
NEUTROPHILS # BLD AUTO: 7.78 THOUSANDS/ΜL (ref 1.85–7.62)
NEUTS SEG NFR BLD AUTO: 83 % (ref 43–75)
NON VENT ROOM AIR: 40 %
NRBC BLD AUTO-RTO: 0 /100 WBCS
O2 CT BLDA-SCNC: 13.7 ML/DL (ref 16–23)
OXYHGB MFR BLDA: 97.2 % (ref 94–97)
PCO2 BLDA: 29.4 MM HG (ref 36–44)
PH BLDA: 7.47 [PH] (ref 7.35–7.45)
PLATELET # BLD AUTO: 110 THOUSANDS/UL (ref 149–390)
PMV BLD AUTO: 12.8 FL (ref 8.9–12.7)
PO2 BLDA: 131.6 MM HG (ref 75–129)
POTASSIUM SERPL-SCNC: 4.1 MMOL/L (ref 3.5–5.3)
PROCALCITONIN SERPL-MCNC: 4.57 NG/ML
RBC # BLD AUTO: 2.97 MILLION/UL (ref 3.81–5.12)
SODIUM SERPL-SCNC: 143 MMOL/L (ref 136–145)
SPECIMEN SOURCE: ABNORMAL
VANCOMYCIN TROUGH SERPL-MCNC: 6.6 UG/ML (ref 10–20)
WBC # BLD AUTO: 9.37 THOUSAND/UL (ref 4.31–10.16)

## 2020-07-26 PROCEDURE — 94640 AIRWAY INHALATION TREATMENT: CPT

## 2020-07-26 PROCEDURE — 94669 MECHANICAL CHEST WALL OSCILL: CPT

## 2020-07-26 PROCEDURE — 82805 BLOOD GASES W/O2 SATURATION: CPT | Performed by: PHYSICIAN ASSISTANT

## 2020-07-26 PROCEDURE — 82948 REAGENT STRIP/BLOOD GLUCOSE: CPT

## 2020-07-26 PROCEDURE — 94760 N-INVAS EAR/PLS OXIMETRY 1: CPT

## 2020-07-26 PROCEDURE — 71045 X-RAY EXAM CHEST 1 VIEW: CPT

## 2020-07-26 PROCEDURE — 80202 ASSAY OF VANCOMYCIN: CPT | Performed by: PHYSICIAN ASSISTANT

## 2020-07-26 PROCEDURE — 93306 TTE W/DOPPLER COMPLETE: CPT | Performed by: INTERNAL MEDICINE

## 2020-07-26 PROCEDURE — 85025 COMPLETE CBC W/AUTO DIFF WBC: CPT | Performed by: PHYSICIAN ASSISTANT

## 2020-07-26 PROCEDURE — 94660 CPAP INITIATION&MGMT: CPT

## 2020-07-26 PROCEDURE — 93306 TTE W/DOPPLER COMPLETE: CPT

## 2020-07-26 PROCEDURE — 99291 CRITICAL CARE FIRST HOUR: CPT | Performed by: STUDENT IN AN ORGANIZED HEALTH CARE EDUCATION/TRAINING PROGRAM

## 2020-07-26 PROCEDURE — 80048 BASIC METABOLIC PNL TOTAL CA: CPT | Performed by: PHYSICIAN ASSISTANT

## 2020-07-26 PROCEDURE — 92610 EVALUATE SWALLOWING FUNCTION: CPT

## 2020-07-26 PROCEDURE — 36600 WITHDRAWAL OF ARTERIAL BLOOD: CPT

## 2020-07-26 PROCEDURE — 84145 PROCALCITONIN (PCT): CPT | Performed by: EMERGENCY MEDICINE

## 2020-07-26 RX ORDER — ACETAMINOPHEN 650 MG/1
650 SUPPOSITORY RECTAL EVERY 6 HOURS PRN
Status: DISCONTINUED | OUTPATIENT
Start: 2020-07-26 | End: 2020-07-27

## 2020-07-26 RX ADMIN — IPRATROPIUM BROMIDE 0.5 MG: 0.5 SOLUTION RESPIRATORY (INHALATION) at 19:59

## 2020-07-26 RX ADMIN — CEFEPIME HYDROCHLORIDE 2000 MG: 2 INJECTION, POWDER, FOR SOLUTION INTRAVENOUS at 07:58

## 2020-07-26 RX ADMIN — LEVALBUTEROL HYDROCHLORIDE 1.25 MG: 1.25 SOLUTION, CONCENTRATE RESPIRATORY (INHALATION) at 19:59

## 2020-07-26 RX ADMIN — LEVALBUTEROL HYDROCHLORIDE 1.25 MG: 1.25 SOLUTION, CONCENTRATE RESPIRATORY (INHALATION) at 07:12

## 2020-07-26 RX ADMIN — VANCOMYCIN HYDROCHLORIDE 750 MG: 750 INJECTION, SOLUTION INTRAVENOUS at 09:56

## 2020-07-26 RX ADMIN — SODIUM CHLORIDE, SODIUM GLUCONATE, SODIUM ACETATE, POTASSIUM CHLORIDE, MAGNESIUM CHLORIDE, SODIUM PHOSPHATE, DIBASIC, AND POTASSIUM PHOSPHATE 75 ML/HR: .53; .5; .37; .037; .03; .012; .00082 INJECTION, SOLUTION INTRAVENOUS at 04:02

## 2020-07-26 RX ADMIN — IPRATROPIUM BROMIDE 0.5 MG: 0.5 SOLUTION RESPIRATORY (INHALATION) at 13:42

## 2020-07-26 RX ADMIN — LEVALBUTEROL HYDROCHLORIDE 1.25 MG: 1.25 SOLUTION, CONCENTRATE RESPIRATORY (INHALATION) at 13:42

## 2020-07-26 RX ADMIN — HEPARIN SODIUM 5000 UNITS: 5000 INJECTION INTRAVENOUS; SUBCUTANEOUS at 14:00

## 2020-07-26 RX ADMIN — CHLORHEXIDINE GLUCONATE 0.12% ORAL RINSE 15 ML: 1.2 LIQUID ORAL at 08:23

## 2020-07-26 RX ADMIN — VANCOMYCIN HYDROCHLORIDE 750 MG: 750 INJECTION, SOLUTION INTRAVENOUS at 16:25

## 2020-07-26 RX ADMIN — SODIUM CHLORIDE, SODIUM GLUCONATE, SODIUM ACETATE, POTASSIUM CHLORIDE, MAGNESIUM CHLORIDE, SODIUM PHOSPHATE, DIBASIC, AND POTASSIUM PHOSPHATE 75 ML/HR: .53; .5; .37; .037; .03; .012; .00082 INJECTION, SOLUTION INTRAVENOUS at 16:24

## 2020-07-26 RX ADMIN — HEPARIN SODIUM 5000 UNITS: 5000 INJECTION INTRAVENOUS; SUBCUTANEOUS at 05:16

## 2020-07-26 RX ADMIN — ACETAMINOPHEN 650 MG: 650 SUPPOSITORY RECTAL at 16:14

## 2020-07-26 RX ADMIN — CHLORHEXIDINE GLUCONATE 0.12% ORAL RINSE 15 ML: 1.2 LIQUID ORAL at 20:19

## 2020-07-26 RX ADMIN — AZITHROMYCIN MONOHYDRATE 500 MG: 500 INJECTION, POWDER, LYOPHILIZED, FOR SOLUTION INTRAVENOUS at 02:38

## 2020-07-26 RX ADMIN — IPRATROPIUM BROMIDE 0.5 MG: 0.5 SOLUTION RESPIRATORY (INHALATION) at 07:12

## 2020-07-26 RX ADMIN — CEFEPIME HYDROCHLORIDE 2000 MG: 2 INJECTION, POWDER, FOR SOLUTION INTRAVENOUS at 20:41

## 2020-07-26 RX ADMIN — NICOTINE 21 MG: 21 PATCH TRANSDERMAL at 07:56

## 2020-07-26 RX ADMIN — HEPARIN SODIUM 5000 UNITS: 5000 INJECTION INTRAVENOUS; SUBCUTANEOUS at 21:37

## 2020-07-26 RX ADMIN — ACETAMINOPHEN 650 MG: 650 SUPPOSITORY RECTAL at 00:57

## 2020-07-26 NOTE — ASSESSMENT & PLAN NOTE
· Suspect prerenal in setting of severe sepsis  · Improved with aggressive IV fluid hydration  · Maintain Matt for strict I&Os  · Trend daily BUN/creatinine

## 2020-07-26 NOTE — SPEECH THERAPY NOTE
Speech-Language Pathology Bedside Swallow Evaluation        Patient Name: Silver Will    Today's Date: 7/26/2020     Problem List  Patient Active Problem List   Diagnosis    Health maintenance examination    Insomnia    Hyperlipemia    Tobacco use    [facility]    Altered mental status    Left upper lobe pneumonia    Acute respiratory failure with hypoxia [facility]    [facility]    Legionella pneumonia ([facility] )       Past Medical History  No past medical history on file  Past Surgical History  Past Surgical History:   Procedure Laterality Date    CERVICAL BIOPSY  W/ LOOP ELECTRODE EXCISION           Current Medical Status  Pt is a 48 y o  female who presented to [facility] on 7/24/20 for evaluation of shortness of breath x 1 week  Patient has significant past medical history of hyperlipidemia, tobacco abuse, and chronic alcohol use  She was seen and evaluated in the emergency department 3 days prior for dehydration in setting of diarrhea and vomiting  Today she returns with chief complaint of shortness of breath  This is associated with productive cough and increased sputum production  She denies fever however does endorse chills  Patient intermittently confused and altered per   Patient works at [employer], however cannot specifically identify any sick contacts  She denies recent travel  She is a current everyday smoker however has not smoked in last began to have secondary to shortness of breath  Upon arrival to the emergency department patient was noted to be febrile 102 6, tachycardic, tachypneic and hypoxic  Chest x-ray revealed significant left upper lobe pneumonia  COVID swab was negative  Patient was started on broad-spectrum antibiotics and aggressively resuscitated  Upon critical care evaluation patient noted to be intermittently altered, diaphoretic, tachypneic with increased work of breathing    The ultimate decision was made to intubate patient  Patient was intubated in the emergency department by ED physician, please see note  She was subsequently admitted to ICU for further medical management and evaluation  Patient was extubated today to HFNC and has been tolerating however with high O2 demand and increased respiratory rate  Patient denies any premorbid dysphagia  Past medical history:   Please see H&P for details    Special Studies:  7/26 CXR pending    7/25 CT chest/abd/pelvis: Left upper lobe lobar pneumonia    7/24 CT head: No acute intracranial abnormality    7/24 CXR: Eft upper lobe pneumonia, better evaluated on subsequently performed CT chest     Swallow Information   Current Risks for Dysphagia & Aspiration: recent intubation, decreased alertness and change in respiratory status     Current Symptoms/Concerns: change in respiratory status    Current Diet: NPO      Baseline Diet: regular diet and thin liquids      Baseline Assessment   Behavior/Cognition: lethargic    Speech/Language Status: able to participate in conversation and able to follow commands    Patient Positioning: upright in recliner      Swallow Mechanism Exam   Facial: symmetrical  Labial: WFL  Lingual: bilateral decreased strength  Velum: unable to visualize  Mandible: mildly reduced strength  Dentition: adequate  Vocal quality:hoarse and reduced vocal volume   Volitional Cough: weak   Resp: HFNC 60lpm    Consistencies Assessed and Performance   Consistencies Administered: nectar thick, honey thick and puree  Specific materials administered included    Oral Stage:   Patient was able to self-feed with good bolus acceptance  Bolus formation and transfer were slow but functional with no significant oral residue noted  No overt s/s reduced oral control  Pharyngeal Stage:   Swallowing initiation appeared mildly delayed  Laryngeal rise was palpated and judged to be mildly reduced   Weak cough was observed intermittently with nectar thick liquids and x3 with honey thick liquids ( 3oz)  Wet vocal quality was observed at times across consistencies with increased RR and WOB  Patient also reported increased subjective SOB throughout  SPO2 remained stable throughout  RR fluctuated throughout into the 60s  Esophageal Concerns: patient admits to chronic GERD symptoms increasing to daily for the past two weeks    Summary   s/s suggestive of mild oral and suspected moderate pharyngeal dysphagia with increased concern for eventual aspiration due to high O2 demand and increased WOB as po progressed      Recommendations: NPO except medications     Recommended Form of Meds: whole with puree and crushed with puree     Aspiration precautions and compensatory swallowing strategies: upright posture, only feed when fully alert, slow rate of feeding and small bites/sips    Results Reviewed with: patient, RN and family     Dysphagia Goals: pt will tolerate regular textures with thin liquids without s/s of aspiration x3    Plan  Will f/u 7/27 for reassessment    HUONG Caba S , [address] [facility]  Speech Language Pathologist   Available via [address] [place] [address] #[id number]  [place] #[id number]

## 2020-07-26 NOTE — ASSESSMENT & PLAN NOTE
· Acute hypoxic respiratory failure in setting of profound left upper lobe pneumonia; now with culture data supporting Legionella PNA  · Continue supplemental O2 therapy to maintain O2 saturation >92%  · HFNC 60L40%  · Antibiotic plan as noted below  Follow-up sputum cultures  Deescalate antibiotics as able  · Needs aggressive chest physiotherapy

## 2020-07-26 NOTE — PROGRESS NOTES
Vancomycin Assessment    Yuniel Lopez is a 48 y o  female who is currently receiving vancomycin vancomycin 750mg IV Q12H for Pneumonia     Relevant clinical data and objective history reviewed:  Creatinine   Date Value Ref Range Status   07/26/2020 0 82 0 60 - 1 30 mg/dL Final     Comment:     Standardized to IDMS reference method   07/25/2020 0 83 0 60 - 1 30 mg/dL Final     Comment:     Standardized to IDMS reference method   07/25/2020 1 03 0 60 - 1 30 mg/dL Final     Comment:     Standardized to IDMS reference method     /80   Pulse 82   Temp 98 6 °F (37 °C) (Bladder)   Resp 20   Ht 5' 6" (1 676 m)   Wt 70 6 kg (155 lb 10 3 oz)   SpO2 96%   BMI 25 12 kg/m²   I/O last 3 completed shifts: In: 9522 3 [I V :6162 3; NG/GT:60; IV Piggyback:3300]  Out: 3615 [Urine:1195; Emesis/NG output:200]  Lab Results   Component Value Date/Time    BUN 10 07/26/2020 05:07 AM    WBC 9 37 07/26/2020 05:07 AM    HGB 9 0 (L) 07/26/2020 05:07 AM    HCT 27 5 (L) 07/26/2020 05:07 AM    MCV 93 07/26/2020 05:07 AM     (L) 07/26/2020 05:07 AM     Temp Readings from Last 3 Encounters:   07/26/20 98 6 °F (37 °C) (Bladder)   07/21/20 99 7 °F (37 6 °C) (Oral)   02/03/20 98 °F (36 7 °C)     Vancomycin Days of Therapy: 3    Assessment/Plan  The patient is currently on vancomycin utilizing scheduled dosing  Baseline risks associated with therapy include: none   The patient is receiving vancomycin 750mg IV Q12H with the most recent vancomycin level being at steady-state and sub-therapeutic based on a goal of 15-20 (appropriate for most indications) ; therefore, after clinical evaluation will be changed to vancomycin 750 mg IV q8h (trough=6 6 )   Pharmacy will continue to follow closely for s/sx of nephrotoxicity, infusion reactions and appropriateness of therapy  BMP and CBC will be ordered per protocol  Plan for trough as patient approaches steady state, prior to the 4th  dose at approximately 7/27/20 at 0830   Pharmacy will continue to follow the patients culture results and clinical progress daily      Josey Noel, Pharmacist

## 2020-07-26 NOTE — ASSESSMENT & PLAN NOTE
· Legionella urine antigen (+) 7/25/20  · Continue Azithromycin antibiotic day #2  · ID consultation  · Case reported to Department of Health

## 2020-07-26 NOTE — RESPIRATORY THERAPY NOTE
07/26/20 0712   Non-Invasive Information   Interface HFNC prongs   Non-Invasive Ventilation Mode HFNC   SpO2 96 %   $ Pulse Oximetry Spot Check Charge Completed   Non-Invasive Settings   FiO2 (%) 40   Flow (lpm) 60   Temperature (Set) 31   Non-Invasive Readings   Heater Temperature (Obs) 30 9   Skin Intervention Skin intact

## 2020-07-26 NOTE — RESPIRATORY THERAPY NOTE
07/26/20 1533   Non-Invasive Information   Interface HFNC prongs   Non-Invasive Ventilation Mode HFNC   SpO2 100 %   $ Pulse Oximetry Spot Check Charge Completed   Non-Invasive Settings   FiO2 (%) 35   Flow (lpm) 45   Temperature (Set) 31   Non-Invasive Readings   Heater Temperature (Obs) 31 5   Skin Intervention Skin intact   Maintenance   Water bag changed Yes 6-8 hrs/night

## 2020-07-26 NOTE — ASSESSMENT & PLAN NOTE
· (+) Legionella Urine antigen  · Finalized sputum culture pending  · Continue broad-spectrum antibiotics; cefepime/vancomycin/azithromycin antibiotic day 2   · Follow up culture data  Deescalate antibiotics as able  · Trend procalcitonin      · Encourage good incentive spirometry/pulmonary toliet  · Aggressive chest physiotherapy  · Mucinex Q12hrs

## 2020-07-26 NOTE — UTILIZATION REVIEW
Initial Clinical Review - Able to proceed to [facility] in [address] [place] [address]  Attempted to Submit multiple times to attach clinical, but screen would not advance  Clinical faxed  Admission: Date/Time/Statement: Admission Orders (From admission, onward)     Ordered        07/25/20 0131  Inpatient Admission  Once                   Orders Placed This Encounter   Procedures    Inpatient Admission     Standing Status:   Standing     Number of Occurrences:   1     Order Specific Question:   Admitting Physician     Answer:   Zeynep Myers [[id number]]     Order Specific Question:   Level of Care     Answer:   Critical Care [15]     Order Specific Question:   Estimated length of stay     Answer:   More than 2 Midnights     Order Specific Question:   Certification     Answer:   I certify that inpatient services are medically necessary for this patient for a duration of greater than two midnights  See H&P and MD Progress Notes for additional information about the patient's course of treatment  ED Arrival Information     Expected Arrival Acuity Means of Arrival Escorted By Service Admission Type    - 7/24/2020 20:55 Emergent Walk-In Spouse Critical Care/ICU Emergency    Arrival Complaint    Confusion, Dizzy, SOB        Chief Complaint   Patient presents with    Altered Mental Status     confusion and difficulty ambulating     Shortness of Breath     SOB, audible respirations      HPI:  48 y o  female who presented to [facility] ED 7/24/20 for evaluation of shortness of breath x 1 week  Patient has significant past medical history of hyperlipidemia, tobacco abuse, and chronic alcohol use  She was seen and evaluated in the emergency department 3 days prior for dehydration in setting of diarrhea and vomiting  Today she returns with chief complaint of shortness of breath  This is associated with productive cough and increased sputum production  Patient intermittently confused and altered per      Upon arrival to the emergency department patient was noted to be febrile 102 6, tachycardic, tachypneic and hypoxic  Chest x-ray revealed significant left upper lobe pneumonia  COVID swab was negative  Patient was started on broad-spectrum antibiotics and aggressively resuscitated  CT head negative  Upon critical care evaluation patient noted to be intermittently altered, diaphoretic, tachypneic with increased work of breathing and was intubated in the ED, pressors initiated  7/25 Plan: Inpatient Critical Care admission for evaluation and treatment of acute respiratory failure with hypoxia in the setting of profound left upper lobe pneumonia, sepsis, altered mental status,  ABELARDO, lactic acidosis, and hypokalemia:   Continue mechanical ventilation, daily SAT/SBT, maintain O2 saturation >92%  Continue broad spectrum antibiotics, follow cultures, trend procalcitonin, WBC and fever curve  Continue IV fluid hydration, norepinephrine, trend daily BUN/creatinine  Replete electrolytes as necessary  7/26 Critical Care:  Urinary Legionella Ag positive  Continue IV antibiotics,  Infectious Disease and Infection Control consulted  Able to be extubated to nasal cannula and weaned off pressors, but required escalation to HFNC overnight  Currently remains on HFNC 40% FiO2 and 60 lpm  Awake and alert, diffuse coarse rhonchi throughout all lung fields  ABELARDO resolved           ED Triage Vitals   Temperature Pulse Respirations Blood Pressure SpO2   07/24/20 2107 07/24/20 2107 07/24/20 2107 07/24/20 2107 07/24/20 2107   (!) 102 6 °F (39 2 °C) (!) 146 (!) 27 108/77 (!) 88 % Room Air      Temp Source Heart Rate Source Patient Position - Orthostatic VS BP Location FiO2 (%)   07/24/20 2107 07/24/20 2107 07/24/20 2107 07/24/20 2107 07/25/20 0135   Oral Monitor Sitting Left arm 100      Pain Score       07/25/20 0221       Med Not Given for Pain - for MAR use only        Wt Readings from Last 1 Encounters:   07/26/20 70 6 kg (155 lb 10 3 oz) Additional Vital Signs:     Date/Time  Temp  Pulse  Resp  BP  MAP (mmHg)  Arterial Line BP  MAP  SpO2  FiO2 (%)   O2 Flow Rate (L/min)  Nasal Cannula O2 Flow Rate (L/min)  O2 Device    07/26/20 1200  98 6 °F (37 °C)  91  22  130/84  102      96 %  40   60 L/min    High flow nasal cannula    07/26/20 0800  98 6 °F (37 °C)  82  20  124/80  96      96 %  40   60 L/min    High flow nasal cannula    07/26/20 0600  98 8 °F (37 1 °C)  82  21  122/73  91      95 %  40   60 L/min    High flow nasal cannula    07/26/20 0400  99 9 °F (37 7 °C)  100  25Abnormal   120/81  97      95 %  40   60 L/min    High flow nasal cannula    07/26/20 0200  100 2 °F (37 9 °C)  89  24Abnormal   124/79  97      96 %  40   60 L/min    High flow nasal cannula    07/26/20 0100  100 2 °F (37 9 °C)  93  24Abnormal   135/85  105      95 %  40   60 L/min    High flow nasal cannula    07/26/20 0025  99 9 °F (37 7 °C)  97  24Abnormal   137/72  98      91 %  5     5 L/min  Nasal cannula    07/26/20 0000  99 9 °F (37 7 °C)  94  23Abnormal   122/79  95      93 %       3 L/min  Nasal cannula    07/25/20 2200  99 °F (37 2 °C)  90  22  114/72  88      93 %       3 L/min  Nasal cannula    07/25/20 2000  98 8 °F (37 1 °C)  87  24Abnormal   109/62  81      94 %       3 L/min  Nasal cannula    07/25/20 1815  98 1 °F (36 7 °C)  92  28Abnormal           95 %             07/25/20 1800  97 9 °F (36 6 °C)  93  31Abnormal   109/67  83      95 %             07/25/20 1730  97 9 °F (36 6 °C)  88  21  101/66  80      95 %             07/25/20 1700  97 9 °F (36 6 °C)  83  24Abnormal   107/66  82      96 %             07/25/20 1645  98 1 °F (36 7 °C)  78  32Abnormal   110/79  92  125/66  88 mmHg  95 %             07/25/20 1640  98 1 °F (36 7 °C)  78  38Abnormal       128/72  93 mmHg  96 %             07/25/20 1615  97 7 °F (36 5 °C)  83  32Abnormal           95 %             07/25/20 1600  97 5 °F (36 4 °C)  83  35Abnormal           95 %     3 L/min    Nasal cannula    07/25/20 1545  97 5 °F (36 4 °C)  92  74Abnormal       101/74  88 mmHg  94 %             07/25/20 1530  97 5 °F (36 4 °C)  81  31Abnormal       118/65  86 mmHg  95 %             07/25/20 1515  97 3 °F (36 3 °C)Abnormal   76  27Abnormal       104/59  77 mmHg  96 %             07/25/20 1500  97 3 °F (36 3 °C)Abnormal   79  39Abnormal       106/59  79 mmHg  95 %             07/25/20 1445  97 2 °F (36 2 °C)Abnormal   84  37Abnormal       106/59  78 mmHg  96 %             07/25/20 1430  97 2 °F (36 2 °C)Abnormal   79  34Abnormal       103/58  77 mmHg  96 %             07/25/20 1415  97 2 °F (36 2 °C)Abnormal   76  43Abnormal       101/58  76 mmHg  96 %             07/25/20 1400  97 °F (36 1 °C)Abnormal   75  41Abnormal       99/57  75 mmHg  96 %             07/25/20 1345  97 °F (36 1 °C)Abnormal   77  37Abnormal       102/59  77 mmHg  96 %             07/25/20 1330  97 °F (36 1 °C)Abnormal   80  30Abnormal       99/56  74 mmHg  96 %             07/25/20 1315  97 °F (36 1 °C)Abnormal   79  30Abnormal       99/61  78 mmHg  96 %             07/25/20 1301                100 %       3 L/min  Nasal cannula    07/25/20 1300  97 °F (36 1 °C)Abnormal   78        105/61  78 mmHg  100 %             07/25/20 1258                       6 L/min  Nasal cannula    07/25/20 1245  96 8 °F (36 °C)Abnormal   69        106/60  77 mmHg  99 %             07/25/20 1230  96 6 °F (35 9 °C)Abnormal   76        110/64  82 mmHg  100 %             07/25/20 1215  96 6 °F (35 9 °C)Abnormal   83        90/54  69 mmHg  95 %             07/25/20 1200  96 6 °F (35 9 °C)Abnormal   77  18      108/64  81 mmHg  100 %  40       Ventilator    07/25/20 1145  96 6 °F (35 9 °C)Abnormal   67        100/58  75 mmHg  89 %Abnormal              07/25/20 1130  96 4 °F (35 8 °C)Abnormal   66        99/58  74 mmHg  94 %             07/25/20 1115  96 4 °F (35 8 °C)Abnormal   68        99/58  75 mmHg  100 %             07/25/20 1100  96 3 °F (35 7 °C)Abnormal   65        96/71  85 mmHg  98 %             07/25/20 1045  96 3 °F (35 7 °C)Abnormal   67        106/60  78 mmHg  99 %             07/25/20 1030  96 3 °F (35 7 °C)Abnormal   84        106/63  82 mmHg  100 %             07/25/20 1015  96 3 °F (35 7 °C)Abnormal   82        97/61  76 mmHg  98 %             07/25/20 1000  96 3 °F (35 7 °C)Abnormal   71        92/53  69 mmHg  99 %             07/25/20 0945  96 1 °F (35 6 °C)Abnormal   71        90/52  67 mmHg  99 %             07/25/20 0930  96 1 °F (35 6 °C)Abnormal   70        91/52  68 mmHg  98 %             07/25/20 0915  95 9 °F (35 5 °C)Abnormal   74        96/56  73 mmHg  99 %             07/25/20 0900  95 9 °F (35 5 °C)Abnormal   77        103/61  79 mmHg  98 %             07/25/20 0845  95 9 °F (35 5 °C)Abnormal   74        99/60  75 mmHg  98 %             07/25/20 0830  95 9 °F (35 5 °C)Abnormal   78        86/52  66 mmHg  98 %             07/25/20 0815  95 9 °F (35 5 °C)Abnormal   77        107/72  87 mmHg  98 %             07/25/20 0800  95 9 °F (35 5 °C)Abnormal   85        107/73  88 mmHg  98 %  40       Ventilator    07/25/20 0745  95 7 °F (35 4 °C)Abnormal   83    107/69  82  103/66  82 mmHg  98 %             07/25/20 0700  95 5 °F (35 3 °C)Abnormal   85    111/73  86  105/69  84 mmHg  98 %             07/25/20 0600  95 9 °F (35 5 °C)Abnormal   72  24Abnormal   97/68  79  95/59  74 mmHg  99 %  50       Ventilator    07/25/20 0442                99 %  50       Ventilator    07/25/20 0400  96 6 °F (35 9 °C)Abnormal   82  30Abnormal   88/63Abnorm  71  89/57  69 mmHg  99 %  50       Ventilator    07/25/20 0300  97 3 °F (36 3 °C)Abnormal   87 28Abnormal   91/66  74  93/50  64 mmHg  98 %  100       Ventilator    07/25/20 0135                100 %  100       Ventilator    07/25/20 0000  99 5 °F (37 5 °C)  129Abnormal   36Abnormal   101/60  73      94 %       3 L/min  Nasal cannula    07/24/20 2300    145Abnormal   64Abnormal   126/74  94      94 %       3 L/min  Nasal cannula      Date and Time Eye Opening Best Verbal Response Best Motor Response Parveen Coma Scale Score   07/26/20 0800 4 4 6 14   07/26/20 0400 4 4 6 14   07/26/20 0000 4 4 6 14   07/25/20 2000 4 4 6 14   07/25/20 1600 4 4 6 14   07/25/20 1200 4 1 6 11   07/25/20 0800 4 1 6 11           Pertinent Labs/Diagnostic Test Results:       7/25 CT AP:  Left upper lobe lobar pneumonia    7/25 EKG: Sinus rhythm with Premature atrial complexes  Low voltage QRS  Prolonged QT  Abnormal ECG  When compared with ECG of 24-JUL-2020 21:06, Sinus rhythm has replaced Atrial fibrillation  Vent  rate has decreased BY  53 BPM  ST no longer depressed in Inferior leads  T wave inversion no longer evident in Inferior leads  T wave inversion no longer evident in Anterolateral leads, QT has lengthened  7/24 EKG:  Atrial fibrillation with rapid ventricular response  Rightward axis  ST & T wave abnormality, consider inferior ischemia  ST & T wave abnormality, consider anterolateral ischemia  Abnormal ECG  When compared with ECG of 21-JUL-2020 22:26,  Atrial fibrillation has replaced Sinus rhythm     7/24 CT head: No acute intracranial abnormality      Results from last 7 days   Lab Units 07/24/20 2127 07/21/20 2123   SARS-COV-2  Negative Negative     Results from last 7 days   Lab Units 07/26/20  0507 07/25/20  0510 07/25/20  0250 07/24/20 2118 07/21/20 2120   WBC Thousand/uL 9 37 9 32  --  14 33* 12 76*   HEMOGLOBIN g/dL 9 0* 9 1*  --  12 3 12 3   HEMATOCRIT % 27 5* 27 2*  --  36 6 36 2   PLATELETS Thousands/uL 110* 104* 98* 126* 155   NEUTROS ABS Thousands/µL 7 78*  --   --   --  10 28* BANDS PCT %  --  22*  --  2  --          Results from last 7 days   Lab Units 07/26/20  0507 07/25/20 2015 07/25/20  1154 07/25/20  0510 07/24/20  2118   SODIUM mmol/L 143 143 140 140 138   POTASSIUM mmol/L 4 1 4 0 4 1 2 9* 2 5*   CHLORIDE mmol/L 110* 110* 109* 106 98*   CO2 mmol/L 22 24 20* 22 27   ANION GAP mmol/L 11 9 11 12 13   BUN mg/dL 10 13 15 16 15   CREATININE mg/dL 0 82 0 83 1 03 1 21 1 54*   EGFR ml/min/1 73sq m 84 82 64 52 39   CALCIUM mg/dL 8 2* 7 8* 7 3* 6 0* 8 1*   CALCIUM, IONIZED mmol/L  --   --  1 04* 0 92*  --    MAGNESIUM mg/dL  --  2 8* 2 8* 1 5*  --    PHOSPHORUS mg/dL  --   --   --  2 6*  --      Results from last 7 days   Lab Units 07/25/20  0510 07/24/20 2118 07/21/20  2120   AST U/L 120* 132* 28   ALT U/L 20 25 14   ALK PHOS U/L 63 83 112   TOTAL PROTEIN g/dL 5 0* 6 8 6 8   ALBUMIN g/dL 1 5* 2 2* 2 5*   TOTAL BILIRUBIN mg/dL 0 90 0 80 0 60     Results from last 7 days   Lab Units 07/26/20  0606 07/26/20  0058   POC GLUCOSE mg/dl 90 88     Results from last 7 days   Lab Units 07/26/20  0507 07/25/20 2015 07/25/20  1154 07/25/20  0510 07/24/20  2118 07/21/20  2120   GLUCOSE RANDOM mg/dL 91 102 179* 175* 128 109             No results found for: BETA-HYDROXYBUTYRATE   Results from last 7 days   Lab Units 07/26/20  0509 07/25/20  1154 07/25/20  0250   PH ART  7 471* 7 366 7 339*   PCO2 ART mm Hg 29 4* 30 6* 35 7*   PO2 ART mm Hg 131 6* 111 1 363 0*   HCO3 ART mmol/L 21 0* 17 1* 18 8*   BASE EXC ART mmol/L -2 0 -7 3 -6 3   O2 CONTENT ART mL/dL 13 7* 12 0* 15 0*   O2 HGB, ARTERIAL % 97 2* 96 5 98 8*   ABG SOURCE  Radial, Left Line, Arterial Line, Arterial                 Results from last 7 days   Lab Units 07/21/20  2239   TROPONIN I ng/mL 0 02         Results from last 7 days   Lab Units 07/24/20  2118   PROTIME seconds 15 3*   INR  1 19   PTT seconds 45*         Results from last 7 days   Lab Units 07/24/20  2118   PROCALCITONIN ng/ml 6 76*     Results from last 7 days   Lab Units 07/25/20  0510 07/25/20  0250 07/24/20  2311 07/24/20  2118   LACTIC ACID mmol/L 1 7 2 2* 2 5* 3 0*                                     Results from last 7 days   Lab Units 07/25/20  0200   CLARITY UA  Cloudy   COLOR UA  Cecilia   SPEC GRAV UA  1 025   PH UA  5 5   GLUCOSE UA mg/dl 100 (1/10%)*   KETONES UA mg/dl Trace*   BLOOD UA  Large*   PROTEIN UA mg/dl >=300*   NITRITE UA  Negative   BILIRUBIN UA  Small*   UROBILINOGEN UA E U /dl 0 2   LEUKOCYTES UA  Negative   WBC UA /hpf 1-2*   RBC UA /hpf 2-4*   BACTERIA UA /hpf Occasional   EPITHELIAL CELLS WET PREP /hpf Occasional     Results from last 7 days   Lab Units 07/25/20  0238 07/25/20  0237   STREP PNEUMONIAE ANTIGEN, URINE  Negative  --    LEGIONELLA URINARY ANTIGEN   --  Positive*                             Results from last 7 days   Lab Units 07/25/20  0247 07/24/20  2118   BLOOD CULTURE   --  No Growth at 24 hrs  No Growth at 24 hrs     SPUTUM CULTURE  Culture too young- will reincubate  --    GRAM STAIN RESULT  No Epithelial cells per low power field  Rare Polys  No bacteria seen  --      Results from last 7 days   Lab Units 07/25/20  0510 07/24/20  2118   TOTAL COUNTED  100 100           ED Treatment:   Medication Administration from 07/24/2020 2055 to 07/25/2020 0214       Date/Time Order Dose Route Action     07/24/2020 2121 sodium chloride 0 9 % bolus 1,000 mL 1,000 mL Intravenous New Bag     07/24/2020 2123 vancomycin (VANCOCIN) IVPB (premix) 1,000 mg 200 mL 1,000 mg Intravenous New Bag     07/24/2020 2146 cefepime (MAXIPIME) 2,000 mg in dextrose 5 % 50 mL IVPB 2,000 mg Intravenous New Bag     07/24/2020 2118 albuterol inhalation solution 5 mg 5 mg Nebulization Given     07/24/2020 2118 ipratropium (ATROVENT) 0 02 % inhalation solution 0 5 mg 0 5 mg Nebulization Given     07/24/2020 2214 sodium chloride 0 9 % bolus 1,000 mL 1,000 mL Intravenous New Bag     07/24/2020 2246 potassium chloride (K-DUR,KLOR-CON) CR tablet 40 mEq 40 mEq Oral Given 07/25/2020 0045 potassium chloride 20 mEq IVPB (premix) 0 mEq Intravenous Stopped     07/24/2020 2245 potassium chloride 20 mEq IVPB (premix) 20 mEq Intravenous New Bag     07/24/2020 2251 acetaminophen (TYLENOL) tablet 650 mg 650 mg Oral Given     07/25/2020 0152 norepinephrine (LEVOPHED) 4 mg (STANDARD CONCENTRATION) IV in sodium chloride 0 9% 250 mL 3 mcg/min Intravenous Rate/Dose Change     07/25/2020 0134 norepinephrine (LEVOPHED) 4 mg (STANDARD CONCENTRATION) IV in sodium chloride 0 9% 250 mL 2 mcg/min Intravenous Rate/Dose Change     07/25/2020 0112 norepinephrine (LEVOPHED) 4 mg (STANDARD CONCENTRATION) IV in sodium chloride 0 9% 250 mL 5 mcg/min Intravenous New Bag     07/25/2020 0129 fentaNYL 1000 mcg in sodium chloride 0 9% 100mL infusion 50 mcg/hr Intravenous New Bag     07/25/2020 0149 fentanyl citrate (PF) 100 MCG/2ML 50 mcg 50 mcg Intravenous Given        No past medical history on file  Present on Admission:   Tobacco use   (Resolved) Fatigue      Admitting Diagnosis: Altered mental status [R41 82]  Pneumonia [J18 9]  Sepsis ([facility] [address] ) [A41 9]  Age/Sex: 48 y o  female       Admission Orders: Cardio-Pulmonary monitoring, mechanical ventilation, weaning and extubation protocol,  SCD        Scheduled Medications:    Medications:  atorvastatin 10 mg Oral Daily   azithromycin 500 mg Intravenous Q24H   cefepime 2,000 mg Intravenous Q12H   chlorhexidine 15 mL Swish & Spit M32S [facility]   folic acid 838 mcg Oral Daily   guaiFENesin 600 mg Oral Q12H ABIGAIL   heparin (porcine) 5,000 Units Subcutaneous Q8H [facility]   ipratropium 0 5 mg Nebulization TID   levalbuterol 1 25 mg Nebulization TID   multi-electrolyte 500 mL Intravenous Once   nicotine 21 mg Transdermal Daily   thiamine 100 mg Oral Daily   vancomycin 750 mg Intravenous Q8H     Continuous IV Infusions:    multi-electrolyte 75 mL/hr Intravenous Continuous     norepinephrine (LEVOPHED) 4 mg (STANDARD CONCENTRATION) IV in sodium chloride 0 9% 250 mL   Rate: 3 8-112 5 mL/hr Dose: 1-30 mcg/min  Freq: Titrated Route: IV  Last Dose: Stopped (07/25/20 1648)  Start: 07/25/20 0100           PRN Meds:    acetaminophen 650 mg Rectal Q6H PRN       IP CONSULT TO PHARMACY  IP CONSULT TO CASE MANAGEMENT  IP CONSULT TO INFECTIOUS DISEASES    Network Utilization Review Department  [email or web address]  ATTENTION: Please call with any questions or concerns to [phone] and carefully listen to the prompts so that you are directed to the right person  All voicemails are confidential   Jennifer Barone all requests for admission clinical reviews, approved or denied determinations and any other requests to dedicated fax number below belonging to the campus where the patient is receiving treatment   List of dedicated fax numbers for the Facilities:  [address] DENIALS (Administrative/Medical Necessity) [phone]   1000  [facility] (Maternity/NICU/Pediatrics) [phone] 5400 [facility] [phone]   [facility] [phone]   Claudia Atkinson [phone]   Carmencita Rodriguez [phone]   [facility]5 [facility] 1525 [facility] [phone]   [facility]  [phone]   2205 [facility]  2401 [facility] [address] [facility] [phone]

## 2020-07-26 NOTE — RESPIRATORY THERAPY NOTE
Pt placed on HFNC at this time due to increased WOB   Pt currently on 60L and 40%         07/26/20 0054   Non-Invasive Information   Interface HFNC prongs   Non-Invasive Ventilation Mode HFNC   $ Intermittent NIV Yes   $ Pulse Oximetry Spot Check Charge Completed   Resp Comments pt placed on HFNC due to increased work of breathing   Non-Invasive Settings   FiO2 (%) 40   Flow (lpm) 60   Temperature (Set) 31   Non-Invasive Readings   Heater Temperature (Obs) 26 9   Skin Intervention Skin intact

## 2020-07-26 NOTE — PLAN OF CARE
Problem: Prexisting or High Potential for Compromised Skin Integrity  Goal: Skin integrity is maintained or improved  Description  INTERVENTIONS:  - Identify patients at risk for skin breakdown  - Assess and monitor skin integrity  - Assess and monitor nutrition and hydration status  - Monitor labs   - Assess for incontinence   - Turn and reposition patient  - Assist with mobility/ambulation  - Relieve pressure over bony prominences  - Avoid friction and shearing  - Provide appropriate hygiene as needed including keeping skin clean and dry  - Evaluate need for skin moisturizer/barrier cream  - Collaborate with interdisciplinary team   - Patient/family teaching  - Consider wound care consult   7/26/2020 1103 by Catie Lan, RN  Outcome: Progressing  7/26/2020 1102 by Catie Lan, RN  Outcome: Progressing

## 2020-07-26 NOTE — PROGRESS NOTES
Progress Note - Kwasi Wilson 1970, 48 y o  female MRN: [id number]    Unit/Bed#:  Encounter: [id number]    Primary Care Provider: Molly Berry MD   Date and time admitted to hospital: 7/24/2020  9:08 PM        * Acute respiratory failure with hypoxia ([facility] )  Assessment & Plan  · Acute hypoxic respiratory failure in setting of profound left upper lobe pneumonia; now with culture data supporting Legionella PNA  · Continue supplemental O2 therapy to maintain O2 saturation >92%  · HFNC 60L40%  · Antibiotic plan as noted below  Follow-up sputum cultures  Deescalate antibiotics as able  · Needs aggressive chest physiotherapy  Legionella pneumonia ([place] [address] )  Assessment & Plan  · Legionella urine antigen (+) 7/25/20  · Continue Azithromycin antibiotic day #2  · ID consultation  · Case reported to Department of Health     Left upper lobe pneumonia  Assessment & Plan  · (+) Legionella Urine antigen  · Finalized sputum culture pending  · Continue broad-spectrum antibiotics; cefepime/vancomycin/azithromycin antibiotic day 2   · Follow up culture data  Deescalate antibiotics as able  · Trend procalcitonin  · Encourage good incentive spirometry/pulmonary toliet  · Aggressive chest physiotherapy  · Mucinex Q12hrs       Altered mental status  Assessment & Plan  · Suspect toxic metabolic  · CT head negative for acute intracranial abnormality  · Frequent sedation breaks/neuro checks  · Delirium precautions  Regulate sleep-wake cycles  Daily CAM ICU  Severe sepsis ([facility] )  Assessment & Plan  · Noted by fever, tachycardia, tachypnea, elevated lactic acid  · Broad-spectrum antibiotics as noted above  · Follow-up sputum/blood cultures  · Trend procalcitonin  · Monitor fever curve and WBC count  Lactic acidosisresolved as of 7/26/2020  Assessment & Plan  · Continue IV fluid hydration  · Trend end points to ensure clearance      ABELARDO (acute kidney injury) ([facility] )  Assessment & Plan  · Suspect prerenal in setting of severe sepsis  · Improved with aggressive IV fluid hydration  · Maintain Matt for strict I&Os  · Trend daily BUN/creatinine  Hyperlipemia  Assessment & Plan  · Atorvastatin 10 mg daily  Tobacco use  Assessment & Plan  · Encourage smoking cessation  · Nicoderm patch while inpatient       ----------------------------------------------------------------------------------------  HPI/24hr events: patient admitted for acute hypoxic respiratory failure in setting of profound BRITTON PNA requiring intubation  Culture data revealing presence of + legionella urine antigen  She was subsequently extubated yesterday afternoon  Unfortunately over night patient developed increase work of breathing and increasing in FIO2 requirements  She was therefore transitioned to HFNC with mild improvement in increase work of breathing  Disposition: Admit to Stepdown Level 1  Code Status: Level 1 - Full Code  ---------------------------------------------------------------------------------------  SUBJECTIVE  "I'm tired  "  ---------------------------------------------------------------------------------------  OBJECTIVE    Vitals   Vitals:    20 0000 20 0025 20 0100 20 0400   BP: 122/79 137/72 135/85 120/81   BP Location: Left arm Left arm Left arm Left arm   Pulse: 94 97 93 100   Resp: (!) 23 (!) 24 (!) 24 (!) 25   Temp: 99 9 °F (37 7 °C) 99 9 °F (37 7 °C) 100 2 °F (37 9 °C) 99 9 °F (37 7 °C)   TempSrc: Bladder Bladder Bladder Bladder   SpO2: 93% 91% 95% 95%   Weight:       Height:         Temp (24hrs), Av 2 °F (36 2 °C), Min:95 5 °F (35 3 °C), Max:100 2 °F (37 9 °C)  Current: Temperature: 99 9 °F (37 7 °C)  Arterial Line BP: 125/66  Arterial Line MAP (mmHg): 88 mmHg    Respiratory:  Nasal Cannula O2 Flow Rate (L/min): 5 L/min    Invasive/non-invasive ventilation settings   Respiratory    Lab Data (Last 4 hours)    None         O2/Vent Data (Last 4 hours)       005          Non-Invasive Ventilation Mode HFNC                   Physical Exam   Constitutional: She is oriented to person, place, and time  Lethargic and ill appearing   Mild distress   HENT:   Head: Normocephalic and atraumatic  Eyes: Pupils are equal, round, and reactive to light  EOM are normal    Neck: Normal range of motion  Neck supple  Cardiovascular: Normal rate and regular rhythm  Pulmonary/Chest: She is in respiratory distress  Accessory muscle use  Coarse rhonchus breathe sounds encompassing entire left lung fields  Abdominal: Soft  Bowel sounds are normal  She exhibits no distension  There is no tenderness  There is no guarding  Musculoskeletal: Normal range of motion  She exhibits no edema  Neurological: She is alert and oriented to person, place, and time  No cranial nerve deficit  intermittently oriented to person and place  Skin: Skin is warm  Vitals reviewed        Laboratory and Diagnostics:  Results from last 7 days   Lab Units 07/25/20  0510 07/25/20  0250 07/24/20 2118 07/21/20  2120   WBC Thousand/uL 9 32  --  14 33* 12 76*   HEMOGLOBIN g/dL 9 1*  --  12 3 12 3   HEMATOCRIT % 27 2*  --  36 6 36 2   PLATELETS Thousands/uL 104* 98* 126* 155   NEUTROS PCT %  --   --   --  81*   BANDS PCT % 22*  --  2  --    MONOS PCT %  --   --   --  10   MONO PCT % 4  --  5  --      Results from last 7 days   Lab Units 07/25/20 2015 07/25/20  1154 07/25/20  0510 07/24/20  2118 07/21/20  2120   SODIUM mmol/L 143 140 140 138 136   POTASSIUM mmol/L 4 0 4 1 2 9* 2 5* 2 4*   CHLORIDE mmol/L 110* 109* 106 98* 96*   CO2 mmol/L 24 20* 22 27 27   ANION GAP mmol/L 9 11 12 13 13   BUN mg/dL 13 15 16 15 10   CREATININE mg/dL 0 83 1 03 1 21 1 54* 1 30   CALCIUM mg/dL 7 8* 7 3* 6 0* 8 1* 7 4*   GLUCOSE RANDOM mg/dL 102 179* 175* 128 109   ALT U/L  --   --  20 25 14   AST U/L  --   --  120* 132* 28   ALK PHOS U/L  --   --  63 83 112   ALBUMIN g/dL  --   --  1 5* 2 2* 2 5*   TOTAL BILIRUBIN mg/dL  --   --  0 90 0 80 0 60 Results from last 7 days   Lab Units 07/25/20  2015 07/25/20  1154 07/25/20  0510   MAGNESIUM mg/dL 2 8* 2 8* 1 5*   PHOSPHORUS mg/dL  --   --  2 6*      Results from last 7 days   Lab Units 07/24/20  2118   INR  1 19   PTT seconds 45*      Results from last 7 days   Lab Units 07/21/20  2239   TROPONIN I ng/mL 0 02     Results from last 7 days   Lab Units 07/25/20  0510 07/25/20  0250 07/24/20  2311 07/24/20  2118   LACTIC ACID mmol/L 1 7 2 2* 2 5* 3 0*     ABG:  Results from last 7 days   Lab Units 07/25/20  1154   PH ART  7 366   PCO2 ART mm Hg 30 6*   PO2 ART mm Hg 111 1   HCO3 ART mmol/L 17 1*   BASE EXC ART mmol/L -7 3   ABG SOURCE  Line, Arterial     VBG:  Results from last 7 days   Lab Units 07/25/20  1154   ABG SOURCE  Line, Arterial     Results from last 7 days   Lab Units 07/24/20  2118   PROCALCITONIN ng/ml 6 76*       Micro  Results from last 7 days   Lab Units 07/25/20  0247 07/25/20  0238 07/25/20  0237 07/24/20 2118   BLOOD CULTURE   --   --   --  Received in Microbiology Lab  Culture in Progress  Received in Microbiology Lab  Culture in Progress  GRAM STAIN RESULT  No Epithelial cells per low power field  Rare Polys  No bacteria seen  --   --   --    LEGIONELLA URINARY ANTIGEN   --   --  Positive*  --    STREP PNEUMONIAE ANTIGEN, URINE   --  Negative  --   --        EKG:   Imaging: I have personally reviewed pertinent reports  and I have personally reviewed pertinent films in PACS    Intake and Output  I/O       07/24 0701 - 07/25 0700 07/25 0701 - 07/26 0700    P  O  0 0    I V  (mL/kg) 1931 5 3709 6 (52 4)    NG/GT  60    IV Piggyback 1600 1450    Total Intake(mL/kg) 3531 5 5219 6 (73 7)    Urine (mL/kg/hr) 360 665 (0 4)    Emesis/NG output 50 150    Total Output 410 815    Net +3121 5 +4404 6                Height and Weights   Height: 5' 6" (167 6 cm)  IBW: 59 3 kg  Body mass index is 25 18 kg/m²    Weight (last 2 days)     Date/Time   Weight    07/25/20 9627   70 8 (179) Nutrition       Diet Orders   (From admission, onward)             Start     Ordered    07/25/20 0139  Diet NPO  Diet effective now     Question Answer Comment   Diet Type NPO    RD to adjust diet per protocol? Yes        07/25/20 0139                  Active Medications  Scheduled Meds:    Current Facility-Administered Medications:  acetaminophen 650 mg Rectal Q6H PRN Brigitte Wray PA-C    atorvastatin 10 mg Oral Daily Brigitte Wray PA-C    azithromycin 500 mg Intravenous Q24H Brigitte Wray PA-C Last Rate: 500 mg (07/25/20 0322)   cefepime 2,000 mg Intravenous Q12H Brigitte Wray PA-C Last Rate: 2,000 mg (07/25/20 2049)   chlorhexidine 15 mL Swish & Spit Q12H [facility] Brigitte Wray PA-C    folic acid 355 mcg Oral Daily Nay Fish MD    guaiFENesin 600 mg Oral Q12H [facility] Nay Fish MD    heparin (porcine) 5,000 Units Subcutaneous [facility] Brigitte Wray PA-C    ipratropium 0 5 mg Nebulization TID Nay Fish MD    levalbuterol 1 25 mg Nebulization TID Nay Fish MD    multi-electrolyte 500 mL Intravenous Once Brigitte Wray PA-C    multi-electrolyte 75 mL/hr Intravenous Continuous Brigitte Wray PA-C Last Rate: 75 mL/hr (07/26/20 0402)   nicotine 21 mg Transdermal Daily Brigitte Wray PA-C    thiamine 100 mg Oral Daily Nay Fish MD    vancomycin 10 mg/kg Intravenous Q12H Brigitte Wray PA-C Last Rate: 750 mg (07/25/20 2116)     Continuous Infusions:    multi-electrolyte 75 mL/hr Last Rate: 75 mL/hr (07/26/20 0402)     PRN Meds:     acetaminophen 650 mg Q6H PRN       Invasive Devices Review  Invasive Devices     Peripheral Intravenous Line            Peripheral IV 07/25/20 Right Hand less than 1 day    Peripheral IV 07/25/20 Right Wrist less than 1 day    Peripheral IV 07/25/20 Right;Upper Arm less than 1 day          Drain            Urethral Catheter Temperature probe 16 Fr  1 day                Rationale for remaining devices: Maintain restrepo catheter in setting of critical illness  ---------------------------------------------------------------------------------------  Advance Directive and Living Will:      Power of :    POLST:    ---------------------------------------------------------------------------------------  Care Time Delivered:   Upon my evaluation, this patient had a high probability of imminent or life-threatening deterioration due to acute hypoxic respiratory failure, legionella PNA, which required my direct attention, intervention, and personal management  I have personally provided 32 minutes (0000 to 0400) of critical care time, exclusive of procedures, teaching, family meetings, and any prior time recorded by providers other than myself  Arjun Baldwin PA-C      Portions of the record may have been created with voice recognition software  Occasional wrong word or "sound a like" substitutions may have occurred due to the inherent limitations of voice recognition software    Read the chart carefully and recognize, using context, where substitutions have occurred

## 2020-07-27 PROBLEM — N17.9 AKI (ACUTE KIDNEY INJURY) (HCC): Status: RESOLVED | Noted: 2020-07-25 | Resolved: 2020-07-27

## 2020-07-27 PROBLEM — R41.82 ALTERED MENTAL STATUS: Status: RESOLVED | Noted: 2020-07-25 | Resolved: 2020-07-27

## 2020-07-27 LAB
ANION GAP SERPL CALCULATED.3IONS-SCNC: 12 MMOL/L (ref 4–13)
BACTERIA SPT RESP CULT: NORMAL
BASOPHILS # BLD AUTO: 0.02 THOUSANDS/ΜL (ref 0–0.1)
BASOPHILS NFR BLD AUTO: 0 % (ref 0–1)
BUN SERPL-MCNC: 11 MG/DL (ref 5–25)
CALCIUM SERPL-MCNC: 7.9 MG/DL (ref 8.3–10.1)
CHLORIDE SERPL-SCNC: 108 MMOL/L (ref 100–108)
CO2 SERPL-SCNC: 24 MMOL/L (ref 21–32)
CREAT SERPL-MCNC: 0.71 MG/DL (ref 0.6–1.3)
EOSINOPHIL # BLD AUTO: 0.01 THOUSAND/ΜL (ref 0–0.61)
EOSINOPHIL NFR BLD AUTO: 0 % (ref 0–6)
ERYTHROCYTE [DISTWIDTH] IN BLOOD BY AUTOMATED COUNT: 18.4 % (ref 11.6–15.1)
GFR SERPL CREATININE-BSD FRML MDRD: 100 ML/MIN/1.73SQ M
GLUCOSE SERPL-MCNC: 87 MG/DL (ref 65–140)
GLUCOSE SERPL-MCNC: 92 MG/DL (ref 65–140)
GRAM STN SPEC: NORMAL
HCT VFR BLD AUTO: 29.8 % (ref 34.8–46.1)
HGB BLD-MCNC: 9.8 G/DL (ref 11.5–15.4)
IMM GRANULOCYTES # BLD AUTO: 0.11 THOUSAND/UL (ref 0–0.2)
IMM GRANULOCYTES NFR BLD AUTO: 1 % (ref 0–2)
LYMPHOCYTES # BLD AUTO: 1.08 THOUSANDS/ΜL (ref 0.6–4.47)
LYMPHOCYTES NFR BLD AUTO: 11 % (ref 14–44)
MAGNESIUM SERPL-MCNC: 2.2 MG/DL (ref 1.6–2.6)
MCH RBC QN AUTO: 30.1 PG (ref 26.8–34.3)
MCHC RBC AUTO-ENTMCNC: 32.9 G/DL (ref 31.4–37.4)
MCV RBC AUTO: 91 FL (ref 82–98)
MONOCYTES # BLD AUTO: 0.49 THOUSAND/ΜL (ref 0.17–1.22)
MONOCYTES NFR BLD AUTO: 5 % (ref 4–12)
NEUTROPHILS # BLD AUTO: 8.26 THOUSANDS/ΜL (ref 1.85–7.62)
NEUTS SEG NFR BLD AUTO: 83 % (ref 43–75)
NRBC BLD AUTO-RTO: 0 /100 WBCS
PLATELET # BLD AUTO: 184 THOUSANDS/UL (ref 149–390)
PMV BLD AUTO: 12.9 FL (ref 8.9–12.7)
POTASSIUM SERPL-SCNC: 3.2 MMOL/L (ref 3.5–5.3)
PROCALCITONIN SERPL-MCNC: 2.33 NG/ML
RBC # BLD AUTO: 3.26 MILLION/UL (ref 3.81–5.12)
SODIUM SERPL-SCNC: 144 MMOL/L (ref 136–145)
VANCOMYCIN TROUGH SERPL-MCNC: 19.3 UG/ML (ref 10–20)
WBC # BLD AUTO: 9.97 THOUSAND/UL (ref 4.31–10.16)

## 2020-07-27 PROCEDURE — 82948 REAGENT STRIP/BLOOD GLUCOSE: CPT

## 2020-07-27 PROCEDURE — 99232 SBSQ HOSP IP/OBS MODERATE 35: CPT | Performed by: INTERNAL MEDICINE

## 2020-07-27 PROCEDURE — 80202 ASSAY OF VANCOMYCIN: CPT | Performed by: PHYSICIAN ASSISTANT

## 2020-07-27 PROCEDURE — 97167 OT EVAL HIGH COMPLEX 60 MIN: CPT

## 2020-07-27 PROCEDURE — 94669 MECHANICAL CHEST WALL OSCILL: CPT

## 2020-07-27 PROCEDURE — 94640 AIRWAY INHALATION TREATMENT: CPT

## 2020-07-27 PROCEDURE — 92526 ORAL FUNCTION THERAPY: CPT

## 2020-07-27 PROCEDURE — 99233 SBSQ HOSP IP/OBS HIGH 50: CPT | Performed by: ANESTHESIOLOGY

## 2020-07-27 PROCEDURE — 85025 COMPLETE CBC W/AUTO DIFF WBC: CPT | Performed by: STUDENT IN AN ORGANIZED HEALTH CARE EDUCATION/TRAINING PROGRAM

## 2020-07-27 PROCEDURE — 83735 ASSAY OF MAGNESIUM: CPT | Performed by: STUDENT IN AN ORGANIZED HEALTH CARE EDUCATION/TRAINING PROGRAM

## 2020-07-27 PROCEDURE — 84145 PROCALCITONIN (PCT): CPT | Performed by: STUDENT IN AN ORGANIZED HEALTH CARE EDUCATION/TRAINING PROGRAM

## 2020-07-27 PROCEDURE — 80048 BASIC METABOLIC PNL TOTAL CA: CPT | Performed by: STUDENT IN AN ORGANIZED HEALTH CARE EDUCATION/TRAINING PROGRAM

## 2020-07-27 PROCEDURE — 97163 PT EVAL HIGH COMPLEX 45 MIN: CPT

## 2020-07-27 PROCEDURE — 94760 N-INVAS EAR/PLS OXIMETRY 1: CPT

## 2020-07-27 PROCEDURE — 94660 CPAP INITIATION&MGMT: CPT

## 2020-07-27 RX ORDER — ACETAMINOPHEN 325 MG/1
650 TABLET ORAL EVERY 6 HOURS PRN
Status: DISCONTINUED | OUTPATIENT
Start: 2020-07-27 | End: 2020-07-27

## 2020-07-27 RX ORDER — ACETAMINOPHEN 325 MG/1
650 TABLET ORAL EVERY 6 HOURS PRN
Status: DISCONTINUED | OUTPATIENT
Start: 2020-07-27 | End: 2020-08-04 | Stop reason: HOSPADM

## 2020-07-27 RX ORDER — POTASSIUM CHLORIDE 14.9 MG/ML
20 INJECTION INTRAVENOUS
Status: COMPLETED | OUTPATIENT
Start: 2020-07-27 | End: 2020-07-27

## 2020-07-27 RX ADMIN — ATORVASTATIN CALCIUM 10 MG: 10 TABLET, FILM COATED ORAL at 17:20

## 2020-07-27 RX ADMIN — VANCOMYCIN HYDROCHLORIDE 750 MG: 750 INJECTION, SOLUTION INTRAVENOUS at 09:33

## 2020-07-27 RX ADMIN — IPRATROPIUM BROMIDE 0.5 MG: 0.5 SOLUTION RESPIRATORY (INHALATION) at 13:48

## 2020-07-27 RX ADMIN — HEPARIN SODIUM 5000 UNITS: 5000 INJECTION INTRAVENOUS; SUBCUTANEOUS at 13:57

## 2020-07-27 RX ADMIN — VANCOMYCIN HYDROCHLORIDE 750 MG: 750 INJECTION, SOLUTION INTRAVENOUS at 01:40

## 2020-07-27 RX ADMIN — CHLORHEXIDINE GLUCONATE 0.12% ORAL RINSE 15 ML: 1.2 LIQUID ORAL at 20:03

## 2020-07-27 RX ADMIN — HEPARIN SODIUM 5000 UNITS: 5000 INJECTION INTRAVENOUS; SUBCUTANEOUS at 05:33

## 2020-07-27 RX ADMIN — GUAIFENESIN 600 MG: 600 TABLET ORAL at 20:03

## 2020-07-27 RX ADMIN — IPRATROPIUM BROMIDE 0.5 MG: 0.5 SOLUTION RESPIRATORY (INHALATION) at 07:44

## 2020-07-27 RX ADMIN — CHLORHEXIDINE GLUCONATE 0.12% ORAL RINSE 15 ML: 1.2 LIQUID ORAL at 08:44

## 2020-07-27 RX ADMIN — SODIUM CHLORIDE, SODIUM GLUCONATE, SODIUM ACETATE, POTASSIUM CHLORIDE, MAGNESIUM CHLORIDE, SODIUM PHOSPHATE, DIBASIC, AND POTASSIUM PHOSPHATE 75 ML/HR: .53; .5; .37; .037; .03; .012; .00082 INJECTION, SOLUTION INTRAVENOUS at 19:19

## 2020-07-27 RX ADMIN — HEPARIN SODIUM 5000 UNITS: 5000 INJECTION INTRAVENOUS; SUBCUTANEOUS at 21:04

## 2020-07-27 RX ADMIN — POTASSIUM CHLORIDE 20 MEQ: 14.9 INJECTION, SOLUTION INTRAVENOUS at 07:57

## 2020-07-27 RX ADMIN — POTASSIUM CHLORIDE 20 MEQ: 14.9 INJECTION, SOLUTION INTRAVENOUS at 09:28

## 2020-07-27 RX ADMIN — AZITHROMYCIN MONOHYDRATE 500 MG: 500 INJECTION, POWDER, LYOPHILIZED, FOR SOLUTION INTRAVENOUS at 02:20

## 2020-07-27 RX ADMIN — IPRATROPIUM BROMIDE 0.5 MG: 0.5 SOLUTION RESPIRATORY (INHALATION) at 19:23

## 2020-07-27 RX ADMIN — LEVALBUTEROL HYDROCHLORIDE 1.25 MG: 1.25 SOLUTION, CONCENTRATE RESPIRATORY (INHALATION) at 13:48

## 2020-07-27 RX ADMIN — SODIUM CHLORIDE, SODIUM GLUCONATE, SODIUM ACETATE, POTASSIUM CHLORIDE, MAGNESIUM CHLORIDE, SODIUM PHOSPHATE, DIBASIC, AND POTASSIUM PHOSPHATE 75 ML/HR: .53; .5; .37; .037; .03; .012; .00082 INJECTION, SOLUTION INTRAVENOUS at 05:35

## 2020-07-27 RX ADMIN — ACETAMINOPHEN 650 MG: 325 TABLET, FILM COATED ORAL at 20:03

## 2020-07-27 RX ADMIN — LEVALBUTEROL HYDROCHLORIDE 1.25 MG: 1.25 SOLUTION, CONCENTRATE RESPIRATORY (INHALATION) at 19:23

## 2020-07-27 RX ADMIN — LEVALBUTEROL HYDROCHLORIDE 1.25 MG: 1.25 SOLUTION, CONCENTRATE RESPIRATORY (INHALATION) at 07:44

## 2020-07-27 RX ADMIN — CEFEPIME HYDROCHLORIDE 2000 MG: 2 INJECTION, POWDER, FOR SOLUTION INTRAVENOUS at 08:55

## 2020-07-27 NOTE — PLAN OF CARE
Problem: Prexisting or High Potential for Compromised Skin Integrity  Goal: Skin integrity is maintained or improved  Description  INTERVENTIONS:  - Identify patients at risk for skin breakdown  - Assess and monitor skin integrity  - Assess and monitor nutrition and hydration status  - Monitor labs   - Assess for incontinence   - Turn and reposition patient  - Assist with mobility/ambulation  - Relieve pressure over bony prominences  - Avoid friction and shearing  - Provide appropriate hygiene as needed including keeping skin clean and dry  - Evaluate need for skin moisturizer/barrier cream  - Collaborate with interdisciplinary team   - Patient/family teaching  - Consider wound care consult   Outcome: Progressing     Problem: Potential for Falls  Goal: Patient will remain free of falls  Description  INTERVENTIONS:  - Assess patient frequently for physical needs  -  Identify cognitive and physical deficits and behaviors that affect risk of falls  -  [place] fall precautions as indicated by assessment   - Educate patient/family on patient safety including physical limitations  - Instruct patient to call for assistance with activity based on assessment  - Modify environment to reduce risk of injury  - Consider OT/PT consult to assist with strengthening/mobility  Outcome: Progressing     Problem: Nutrition/Hydration-ADULT  Goal: Nutrient/Hydration intake appropriate for improving, restoring or maintaining nutritional needs  Description  Monitor and assess patient's nutrition/hydration status for malnutrition  Collaborate with interdisciplinary team and initiate plan and interventions as ordered  Monitor patient's weight and dietary intake as ordered or per policy  Utilize nutrition screening tool and intervene as necessary  Determine patient's food preferences and provide high-protein, high-caloric foods as appropriate       INTERVENTIONS:  - Monitor oral intake, urinary output, labs, and treatment plans  - Assess nutrition and hydration status and recommend course of action  - Evaluate amount of meals eaten  - Assist patient with eating if necessary   - Allow adequate time for meals  - Recommend/ encourage appropriate diets, oral nutritional supplements, and vitamin/mineral supplements  - Order, calculate, and assess calorie counts as needed  - Recommend, monitor, and adjust tube feedings and TPN/PPN based on assessed needs  - Assess need for intravenous fluids  - Provide specific nutrition/hydration education as appropriate  - Include patient/family/caregiver in decisions related to nutrition  Outcome: Progressing     Problem: PAIN - ADULT  Goal: Verbalizes/displays adequate comfort level or baseline comfort level  Description  Interventions:  - Encourage patient to monitor pain and request assistance  - Assess pain using appropriate pain scale  - Administer analgesics based on type and severity of pain and evaluate response  - Implement non-pharmacological measures as appropriate and evaluate response  - Consider cultural and social influences on pain and pain management  - Notify physician/advanced practitioner if interventions unsuccessful or patient reports new pain  Outcome: Progressing     Problem: INFECTION - ADULT  Goal: Absence or prevention of progression during hospitalization  Description  INTERVENTIONS:  - Assess and monitor for signs and symptoms of infection  - Monitor lab/diagnostic results  - Monitor all insertion sites, i e  indwelling lines, tubes, and drains  - Monitor endotracheal if appropriate and nasal secretions for changes in amount and color  - [place] appropriate cooling/warming therapies per order  - Administer medications as ordered  - Instruct and encourage patient and family to use good hand hygiene technique  - Identify and instruct in appropriate isolation precautions for identified infection/condition  Outcome: Progressing  Goal: Absence of fever/infection during neutropenic period  Description  INTERVENTIONS:  - Monitor WBC    Outcome: Progressing     Problem: SAFETY ADULT  Goal: Maintain or return to baseline ADL function  Description  INTERVENTIONS:  -  Assess patient's ability to carry out ADLs; assess patient's baseline for ADL function and identify physical deficits which impact ability to perform ADLs (bathing, care of mouth/teeth, toileting, grooming, dressing, etc )  - Assess/evaluate cause of self-care deficits   - Assess range of motion  - Assess patient's mobility; develop plan if impaired  - Assess patient's need for assistive devices and provide as appropriate  - Encourage maximum independence but intervene and supervise when necessary  - Involve family in performance of ADLs  - Assess for home care needs following discharge   - Consider OT consult to assist with ADL evaluation and planning for discharge  - Provide patient education as appropriate  Outcome: Progressing  Goal: Maintain or return mobility status to optimal level  Description  INTERVENTIONS:  - Assess patient's baseline mobility status (ambulation, transfers, stairs, etc )    - Identify cognitive and physical deficits and behaviors that affect mobility  - Identify mobility aids required to assist with transfers and/or ambulation (gait belt, sit-to-stand, lift, walker, cane, etc )  - [place] fall precautions as indicated by assessment  - Record patient progress and toleration of activity level on Mobility SBAR; progress patient to next Phase/Stage  - Instruct patient to call for assistance with activity based on assessment  - Consider rehabilitation consult to assist with strengthening/weightbearing, etc   Outcome: Progressing     Problem: DISCHARGE PLANNING  Goal: Discharge to home or other facility with appropriate resources  Description  INTERVENTIONS:  - Identify barriers to discharge w/patient and caregiver  - Arrange for needed discharge resources and transportation as appropriate  - Identify discharge learning needs (meds, wound care, etc )  - Arrange for interpretive services to assist at discharge as needed  - Refer to Case Management Department for coordinating discharge planning if the patient needs post-hospital services based on physician/advanced practitioner order or complex needs related to functional status, cognitive ability, or social support system  Outcome: Progressing     Problem: Knowledge Deficit  Goal: Patient/family/caregiver demonstrates understanding of disease process, treatment plan, medications, and discharge instructions  Description  Complete learning assessment and assess knowledge base    Interventions:  - Provide teaching at level of understanding  - Provide teaching via preferred learning methods  Outcome: Progressing     Problem: NEUROSENSORY - ADULT  Goal: Achieves stable or improved neurological status  Description  INTERVENTIONS  - Monitor and report changes in neurological status  - Monitor vital signs such as temperature, blood pressure, glucose, and any other labs ordered   - Initiate measures to prevent increased intracranial pressure  - Monitor for seizure activity and implement precautions if appropriate      Outcome: Progressing     Problem: RESPIRATORY - ADULT  Goal: Achieves optimal ventilation and oxygenation  Description  INTERVENTIONS:  - Assess for changes in respiratory status  - Assess for changes in mentation and behavior  - Position to facilitate oxygenation and minimize respiratory effort  - Oxygen administered by appropriate delivery if ordered  - Initiate smoking cessation education as indicated  - Encourage broncho-pulmonary hygiene including cough, deep breathe, Incentive Spirometry  - Assess the need for suctioning and aspirate as needed  - Assess and instruct to report SOB or any respiratory difficulty  - Respiratory Therapy support as indicated  Outcome: Progressing     Problem: GENITOURINARY - ADULT  Goal: Maintains or returns to baseline urinary function  Description  INTERVENTIONS:  - Assess urinary function  - Encourage oral fluids to ensure adequate hydration if ordered  - Administer IV fluids as ordered to ensure adequate hydration  - Administer ordered medications as needed  - Offer frequent toileting  - Follow urinary retention protocol if ordered  Outcome: Progressing  Goal: Urinary catheter remains patent  Description  INTERVENTIONS:  - Assess patency of urinary catheter  - If patient has a chronic restrepo, consider changing catheter if non-functioning  - Follow guidelines for intermittent irrigation of non-functioning urinary catheter  Outcome: Progressing

## 2020-07-27 NOTE — RESPIRATORY THERAPY NOTE
07/27/20 0745   Non-Invasive Information   Interface HFNC prongs   Non-Invasive Ventilation Mode HFNC   SpO2 94 %   $ Pulse Oximetry Spot Check Charge Completed   Resp Comments pt awake and alert  no distress  tx given      Non-Invasive Settings   FiO2 (%) 35   Flow (lpm) 45   Temperature (Set) 31   Non-Invasive Readings   Heater Temperature (Obs) 31   Skin Intervention Skin intact   Maintenance   Water bag changed No

## 2020-07-27 NOTE — ASSESSMENT & PLAN NOTE
· Acute hypoxic respiratory failure in setting of profound left upper lobe pneumonia; now with culture data supporting Legionella PNA  · Continue supplemental O2 therapy to maintain O2 saturation >92%  · HFNC 45L35%  · Antibiotic plan as noted below  Follow-up sputum cultures  Deescalate antibiotics as able  · Continue with aggressive chest physiotherapy

## 2020-07-27 NOTE — SPEECH THERAPY NOTE
Speech Language/Pathology    Speech/Language Pathology Progress Note    Patient Name: Silver Will  Today's Date: 7/27/2020     Subjective:  Patients  present throughout  She is now on 45lpm however continues to report increased SOB at rest     Objective:  Patient noted with improved vocal volume and slightly improved quality today although remains hoarse  She was seen with puree, honey thick and nectar thick today  Patient was able to self-feed with good bolus acceptance  Bolus formation and transfer were slow but functional with no significant oral residue noted  No overt s/s reduced oral control  Swallowing initiation appeared mildly delayed  Laryngeal rise was palpated and judged to be minimally (improved from yesterday)  Weak cough was observed intermittently with nectar thick liquids today with increased wet respirations across liquids  When patient was cued to provide strong cough with nectar thick liquids expectoration of nectar thick juice was observed  No wet vocal quality noted today however she continues with increased RR and visible WOB  Patient continues to report increased subjective SOB throughout increasing with intake  SPO2 remained stable throughout  RR fluctuated throughout into the 46s  Assessment:  Oropharyngeal swallow function appears improved from yesterday however remains guarded with likely impaired respiratory swallow coordination  I suspect her airway closure and protection is improving but her fatigue level and O2 demand increase her aspiration risk  May need VBS      Plan/Recommendations:  Continue NPO- may allow small amounts of puree with monitoring  meds ok crushed in puree    Will continue to follow    HUONG Cee S , [id number] [facility]  Speech Language Pathologist   Available via [address] [place] [address] #[id number]  [id number] #[id number]

## 2020-07-27 NOTE — ASSESSMENT & PLAN NOTE
· Acute hypoxic respiratory failure in setting of profound left upper lobe pneumonia; now with culture data supporting Legionella PNA  · Continue supplemental O2 therapy to maintain O2 saturation >88%  · HFNC 45L35%  · Continue with aggressive chest physiotherapy  · Continue nebulizer therapy with Mucinex

## 2020-07-27 NOTE — PROGRESS NOTES
Progress Note - Emmanuel Bhakta 1970, 48 y o  female MRN: [id number]    Unit/Bed#:  Encounter: [id number]    Primary Care Provider: Awa Terrell MD   Date and time admitted to hospital: 7/24/2020  9:08 PM        * Acute respiratory failure with hypoxia ([place] [id number] )  Assessment & Plan  · Acute hypoxic respiratory failure in setting of profound left upper lobe pneumonia; now with culture data supporting Legionella PNA  · Continue supplemental O2 therapy to maintain O2 saturation >92%  · HFNC 45L35%  · Antibiotic plan as noted below  Follow-up sputum cultures  Deescalate antibiotics as able  · Continue with aggressive chest physiotherapy  Legionella pneumonia ([place] [id number] )  Assessment & Plan  · Legionella urine antigen (+) 7/25/20  · Continue Azithromycin antibiotic day #3  · ID consultation  · Case reported to Department of Health     Left upper lobe pneumonia  Assessment & Plan  · (+) Legionella Urine antigen  · Finalized sputum culture pending  · Continue broad-spectrum antibiotics; cefepime/vancomycin/azithromycin antibiotic day 2   · Follow up culture data  Deescalate antibiotics as able  · Trend procalcitonin  · Encourage good incentive spirometry/pulmonary toliet  · Aggressive chest physiotherapy  · Mucinex Q12hrs       Altered mental status  Assessment & Plan  · Suspect toxic metabolic, now improved  · CT head negative for acute intracranial abnormality  · Frequent neuro checks  · Delirium precautions  Regulate sleep-wake cycles  Daily CAM ICU  Severe sepsis ([place] [address] )  Assessment & Plan  · Noted by fever, tachycardia, tachypnea, elevated lactic acid  · Broad-spectrum antibiotics as noted above  · Follow-up sputum/blood cultures  · Trend procalcitonin  · Monitor fever curve and WBC count  ABELARDO (acute kidney injury) ([place] [id number] )  Assessment & Plan  · Suspect prerenal in setting of severe sepsis  · Improved with aggressive IV fluid hydration  · Maintain Matt for strict I&Os    · Trend daily BUN/creatinine  Hyperlipemia  Assessment & Plan  · Atorvastatin 10 mg daily  Tobacco use  Assessment & Plan  · Encourage smoking cessation  · Nicoderm patch while inpatient       ----------------------------------------------------------------------------------------  HPI/24hr events: HFNC weaning  Disposition: Continue Stepdown Level 1 level of care   Code Status: Level 1 - Full Code  ---------------------------------------------------------------------------------------  SUBJECTIVE  Resting comfortably in bed    Review of Systems   Constitutional: Negative for chills and fever  HENT: Negative  Eyes: Negative  Respiratory: Negative for cough, shortness of breath, wheezing and stridor  Cardiovascular: Negative for chest pain, palpitations and leg swelling  Gastrointestinal: Negative for abdominal distention, abdominal pain, blood in stool, diarrhea, nausea and vomiting  Endocrine: Negative  Genitourinary: Negative for dysuria, flank pain, frequency and urgency  Musculoskeletal: Negative  Skin: Negative for rash and wound  Allergic/Immunologic: Negative  Neurological: Negative for dizziness, syncope, facial asymmetry, weakness, light-headedness, numbness and headaches  Hematological: Negative for adenopathy  Does not bruise/bleed easily  Psychiatric/Behavioral: Negative        Review of systems was reviewed and negative unless stated above in HPI/24-hour events   ---------------------------------------------------------------------------------------  OBJECTIVE    Vitals   Vitals:    20 2200 20 2326 20 0000 20 0200   BP: 112/69  114/72 117/74   BP Location: Left arm  Left arm Left arm   Pulse: 84  85 83   Resp: (!) 23  22 (!) 23   Temp: 98 6 °F (37 °C)  99 °F (37 2 °C) 99 3 °F (37 4 °C)   TempSrc: Bladder  Bladder Bladder   SpO2: 98% 97% 97% 97%   Weight:       Height:         Temp (24hrs), Av 1 °F (37 3 °C), Min:98 6 °F (37 °C), Max:100 2 °F (37 9 °C)  Current: Temperature: 99 3 °F (37 4 °C)  Arterial Line BP: 125/66  Arterial Line MAP (mmHg): 88 mmHg    Respiratory:  SpO2: SpO2: 97 %  Nasal Cannula O2 Flow Rate (L/min): 5 L/min    Invasive/non-invasive ventilation settings   Respiratory    Lab Data (Last 4 hours)    None         O2/Vent Data (Last 4 hours)    None                Physical Exam   Constitutional: She is oriented to person, place, and time  She appears well-developed and well-nourished  HENT:   Head: Normocephalic and atraumatic  Eyes: Pupils are equal, round, and reactive to light  Neck: No JVD present  Cardiovascular: Normal rate, regular rhythm and normal heart sounds  Pulmonary/Chest: She has rhonchi  Abdominal: Bowel sounds are normal    Neurological: She is alert and oriented to person, place, and time  GCS eye subscore is 4  GCS verbal subscore is 5  GCS motor subscore is 6  Psychiatric: She has a normal mood and affect         Laboratory and Diagnostics:  Results from last 7 days   Lab Units 07/26/20  0507 07/25/20 0510 07/25/20  0250 07/24/20 2118 07/21/20 2120   WBC Thousand/uL 9 37 9 32  --  14 33* 12 76*   HEMOGLOBIN g/dL 9 0* 9 1*  --  12 3 12 3   HEMATOCRIT % 27 5* 27 2*  --  36 6 36 2   PLATELETS Thousands/uL 110* 104* 98* 126* 155   NEUTROS PCT % 83*  --   --   --  81*   BANDS PCT %  --  22*  --  2  --    MONOS PCT % 5  --   --   --  10   MONO PCT %  --  4  --  5  --      Results from last 7 days   Lab Units 07/26/20  0507 07/25/20 2015 07/25/20  1154 07/25/20  0510 07/24/20 2118 07/21/20  2120   SODIUM mmol/L 143 143 140 140 138 136   POTASSIUM mmol/L 4 1 4 0 4 1 2 9* 2 5* 2 4*   CHLORIDE mmol/L 110* 110* 109* 106 98* 96*   CO2 mmol/L 22 24 20* 22 27 27   ANION GAP mmol/L 11 9 11 12 13 13   BUN mg/dL 10 13 15 16 15 10   CREATININE mg/dL 0 82 0 83 1 03 1 21 1 54* 1 30   CALCIUM mg/dL 8 2* 7 8* 7 3* 6 0* 8 1* 7 4*   GLUCOSE RANDOM mg/dL 91 102 179* 175* 128 109   ALT U/L  --   --   --  20 25 14   AST U/L  --   -- --  120* 132* 28   ALK PHOS U/L  --   --   --  63 83 112   ALBUMIN g/dL  --   --   --  1 5* 2 2* 2 5*   TOTAL BILIRUBIN mg/dL  --   --   --  0 90 0 80 0 60     Results from last 7 days   Lab Units 07/25/20 2015 07/25/20  1154 07/25/20  0510   MAGNESIUM mg/dL 2 8* 2 8* 1 5*   PHOSPHORUS mg/dL  --   --  2 6*      Results from last 7 days   Lab Units 07/24/20  2118   INR  1 19   PTT seconds 45*      Results from last 7 days   Lab Units 07/21/20  2239   TROPONIN I ng/mL 0 02     Results from last 7 days   Lab Units 07/25/20  0510 07/25/20  0250 07/24/20  2311 07/24/20  2118   LACTIC ACID mmol/L 1 7 2 2* 2 5* 3 0*     ABG:  Results from last 7 days   Lab Units 07/26/20  0509   PH ART  7 471*   PCO2 ART mm Hg 29 4*   PO2 ART mm Hg 131 6*   HCO3 ART mmol/L 21 0*   BASE EXC ART mmol/L -2 0   ABG SOURCE  Radial, Left     VBG:  Results from last 7 days   Lab Units 07/26/20  0509   ABG SOURCE  Radial, Left     Results from last 7 days   Lab Units 07/26/20  0515 07/24/20 2118   PROCALCITONIN ng/ml 4 57* 6 76*       Micro  Results from last 7 days   Lab Units 07/25/20  0247 07/25/20  0238 07/25/20  0237 07/24/20 2118   BLOOD CULTURE   --   --   --  No Growth at 24 hrs  SPUTUM CULTURE  Culture too young- will reincubate  --   --   --    GRAM STAIN RESULT  No Epithelial cells per low power field  Rare Polys  No bacteria seen  --   --  Gram positive cocci in clusters*   MRSA CULTURE ONLY   --  No Methicillin Resistant Staphlyococcus aureus (MRSA) isolated  --   --    LEGIONELLA URINARY ANTIGEN   --   --  Positive*  --    STREP PNEUMONIAE ANTIGEN, URINE   --  Negative  --   --        EKG: NSR      Intake and Output  I/O       07/25 0701 - 07/26 0700 07/26 0701 - 07/27 0700    P  O  0 0    I V  (mL/kg) 4230 9 (59 9) 1498 8 (21 2)    NG/GT 60     IV Piggyback 1700 550    Total Intake(mL/kg) 5990 9 (84 9) 2048 8 (29)    Urine (mL/kg/hr) 835 (0 5) 925 (0 5)    Emesis/NG output 150     Total Output 985 925    Net +5005 9 +1123 8 Height and Weights   Height: 5' 6" (167 6 cm)  IBW: 59 3 kg  Body mass index is 25 12 kg/m²  Weight (last 2 days)     Date/Time   Weight    07/26/20 0600   70 6 (155 65)    07/25/20 0733   70 8 (156)                Nutrition       Diet Orders   (From admission, onward)             Start     Ordered    07/25/20 0139  Diet NPO  Diet effective now     Question Answer Comment   Diet Type NPO    RD to adjust diet per protocol?  Yes        07/25/20 0139                  Active Medications  Scheduled Meds:  Current Facility-Administered Medications:  acetaminophen 650 mg Rectal Q6H PRN Hannah Ramirez PA-C    atorvastatin 10 mg Oral Daily Hannah Ramirez PA-C    azithromycin 500 mg Intravenous Q24H Hannah Ramirez PA-C Last Rate: 500 mg (07/25/20 0322)   cefepime 2,000 mg Intravenous Q12H Hannah Ramirez PA-C Last Rate: 2,000 mg (07/26/20 2041)   chlorhexidine 15 mL Swish & Spit Q12H [address] Hannah Ramirez PA-C    folic acid 908 mcg Oral Daily Anika Hutton MD    guaiFENesin 600 mg Oral Q12H [address] Anika Hutton MD    heparin (porcine) 5,000 Units Subcutaneous [facility] Hannah Ramirez PA-C    ipratropium 0 5 mg Nebulization TID Anika Hutton MD    levalbuterol 1 25 mg Nebulization TID Anika Hutton MD    multi-electrolyte 500 mL Intravenous Once Hannah Ramirez PA-C    multi-electrolyte 75 mL/hr Intravenous Continuous Hannah Ramirez PA-C Last Rate: 75 mL/hr (07/26/20 1624)   nicotine 21 mg Transdermal Daily Hannah Ramirez PA-C    thiamine 100 mg Oral Daily Anika Hutton MD    vancomycin 750 mg Intravenous Q8H Hannah Ramirez PA-C Last Rate: 750 mg (07/27/20 0140)     Continuous Infusions:    multi-electrolyte 75 mL/hr Last Rate: 75 mL/hr (07/26/20 1624)     PRN Meds:     acetaminophen 650 mg Q6H PRN       Invasive Devices Review  Invasive Devices     Peripheral Intravenous Line            Peripheral IV 07/25/20 Right Hand 1 day    Peripheral IV 07/25/20 Right Wrist 1 day    Peripheral IV 07/25/20 Right;Upper Arm 1 day          Drain            Urethral Catheter Temperature probe 16 Fr  2 days                ---------------------------------------------------------------------------------------  Advance Directive and Living Will:      Power of :    POLST:    ---------------------------------------------------------------------------------------  Care Time Delivered:   No Critical Care time spent       [facility]      Portions of the record may have been created with voice recognition software  Occasional wrong word or "sound a like" substitutions may have occurred due to the inherent limitations of voice recognition software    Read the chart carefully and recognize, using context, where substitutions have occurred

## 2020-07-27 NOTE — ASSESSMENT & PLAN NOTE
· Suspect toxic metabolic, now improved  · CT head negative for acute intracranial abnormality  · Frequent neuro checks  · Delirium precautions  Regulate sleep-wake cycles  Daily CAM ICU

## 2020-07-27 NOTE — ASSESSMENT & PLAN NOTE
· Legionella urine antigen (+) 7/25/20  · Continue Azithromycin antibiotic day #5  · Consider transitioning to oral   · ID consultation  · Case reported to Department of Health

## 2020-07-27 NOTE — PLAN OF CARE
Problem: OCCUPATIONAL THERAPY ADULT  Goal: Performs self-care activities at highest level of function for planned discharge setting  See evaluation for individualized goals  Description  Treatment Interventions: ADL retraining, Functional transfer training, UE strengthening/ROM, Endurance training, Patient/family training, Equipment evaluation/education, Compensatory technique education, Cardiac education, Activityengagement, Energy conservation, Continued evaluation          See flowsheet documentation for full assessment, interventions and recommendations  Note:   Limitation: Decreased ADL status, Decreased Safe judgement during ADL, Decreased endurance, Decreased self-care trans, Decreased high-level ADLs  Prognosis: Good  Assessment: Patient is a 48 y o  female seen for OT evaluation s/p admit to [id number] [facility] on 7/24/2020 w/Acute respiratory failure with hypoxia ([facility] [place] [address] )  Commorbidities affecting patient's functional performance at time of assessment include: Legionella pneumonia, ABELARDO, L upper lobe pneumonia, AMS, severe sepsis, tobacco use, hyperlipidemia  Orders placed for OT evaluation and treatment  Performed at least two patient identifiers during session including name and wristband  Prior to admission, Patient and  reported independent with ADLs/ IADLs, ambulated with no AD, worked part time as a , also driving and physically active  Patient lives with her  and family in a  2 story house, 1 GO + 6 + 7 to main level  Personal factors affecting patient at time of initial evaluation include: inaccesible home environment, steps to enter, limited insight into deficits, decreased initiation and engagement, difficulty performing ADLs and difficulty performing IADLs  Upon evaluation, patient requires moderate assist for UB ADLs, maximal assist for LB ADLs, transfers and functional ambulation in room with minimal  assist x 2, with the use of Rolling Walker   Occupational performance is affected by the following deficits: orientation, decreased functional use of BUEs, impaired gross motor coordination, dynamic sit/ stand balance deficit with poor standing tolerance time for self care and functional mobility, decreased activity tolerance and postural control and postural alignment deficit, requiring external assistance to complete transitional movements  Therapist completed  extensive additional review of medical records and additional review of physical, cognitive or psychosocial history, clinical examination identifying 5 or more performance deficits, clinical decision making of a high complexity , consistent with a high complexity level evaluation  Patient to benefit from continued Occupational Therapy treatment while in the hospital to address deficits as defined above and maximize level of functional independence with ADLs and functional mobility  Occupational Performance areas to address include: grooming , bathing/ shower, dressing, toilet hygiene, transfer to all surfaces, functional ambulation, functional mobility, emergency response, health maintenance, medication routine/ management, IADLS: Household maintenance, IADLs: safety procedures, IADLs: meal prep/ clean up, Leisure Participation and Social participation        OT Discharge Recommendation: Post-Acute Rehabilitation Services

## 2020-07-27 NOTE — PLAN OF CARE
Problem: PHYSICAL THERAPY ADULT  Goal: Performs mobility at highest level of function for planned discharge setting  See evaluation for individualized goals  Description  Treatment/Interventions: Functional transfer training, LE strengthening/ROM, Elevations, Therapeutic exercise, Endurance training, Patient/family training, Bed mobility, Gait training, Equipment eval/education, Spoke to nursing, OT, Family  Equipment Recommended: Marilee Bruner       See flowsheet documentation for full assessment, interventions and recommendations  Note:   Prognosis: Fair  Problem List: Decreased strength, Decreased endurance, Impaired balance, Decreased mobility, Decreased cognition  Assessment: Pt is 48 y o  female seen for PT evaluation s/p admit to [facility] on 7/24/2020 w/ Acute respiratory failure with hypoxia ([facility] [address] )  PT consulted to assess pt's functional mobility and d/c needs  Order placed for PT eval and tx, w/ up w/ A order  Performed at least 2 patient identifiers during session: Name and wristband  Comorbidities affecting pt's physical performance at time of assessment include: Legionella pneumonia, ABELARDO, L upper lobe pneumonia, AMS, severe sepsis, tobacco use, hyperlipidemia  PTA, pt was independent w/ all functional mobility w/ no AD, ambulates unrestricted distances and all terrain, lives in multi-level home, has 1 GO, lives w/ family in bi-level house and works part time  Personal factors affecting pt at time of IE include: inaccessible home environment, ambulating w/ assistive device, stairs to enter home, inability to ambulate household distances, inability to navigate community distances, inability to navigate level surfaces w/o external assistance, unable to perform dynamic tasks in community, decreased cognition, inability to perform current job functions, inability to perform IADLs and inability to perform ADLs   Please find objective findings from PT assessment regarding body systems outlined above with impairments and limitations including weakness, impaired balance, decreased endurance, gait deviations, decreased activity tolerance, decreased functional mobility tolerance, fall risk and decreased cognition  The following objective measures performed on IE also reveal limitations: Barthel Index: 25/100 and Modified Cooper: 4 (moderate/severe disability)  Pt's clinical presentation is currently unstable/unpredictable seen in pt's presentation of ongoing medical management/monitoring, evident in need for assist w/ all phases of mobility when usually mobilizing independently, and need for input for mobility technique/safety  Pt to benefit from continued PT tx to address deficits as defined above and maximize level of functional independent mobility and consistency  From PT/mobility standpoint, recommendation at time of d/c would be STR pending progress in order to facilitate return to PLOF  Barriers to Discharge: Inaccessible home environment     PT Discharge Recommendation: [id number]     PT - OK to Discharge: Yes(when medically cleared; if to STR)    See flowsheet documentation for full assessment

## 2020-07-27 NOTE — CONSULTS
Consultation - Infectious Disease   Chaka Parish 48 y o  female MRN: [id number]  Unit/Bed#:  Encounter: [id number]      IMPRESSION & RECOMMENDATIONS:   Impression/Recommendations: This is a 48 y o  female, with history of chronic alcohol use and nicotine abuse, admitted on 7/25 with Legionella pneumonia, septic shock and acute hypoxic respiratory failure  Patient is clinically improving  1  Septic shock, POA  Source is most likely pneumonia  Patient is clinically much improved  SBP has normalized  Antibiotic plan as in below  Monitor temperature/WBC  Monitor hemodynamics  2  Legionella pneumonia  This is likely etiology of septic shock and acute hypoxic respiratory failure  Patient is clinically much improved on azithromycin  Source of Legionella is most likely patient's old AC units  Continue azithromycin  No further need for cefepime  I will discontinue  Monitor respiratory symptoms  Monitor temperature/WBC  Recommend that patient and  discard the old AC units or, at the least, discard the old filters  3  Acute hypoxic respiratory failure, secondary to pneumonia above  Patient was briefly intubated but is now extubated  She is still on high level O2 support via high-flow nasal cannula but is improving  O2 support per primary service  Monitor respiratory symptoms  4  GPC in clusters bacteremia, with growth in 1 out of 2 admission blood cultures  This is most likely contaminant  GPC will most likely be coagulase negative Staphylococcus  No antibiotic needed for this  Will discontinue IV vancomycin  Follow-up on final blood culture results  5  ABELARDO, POA, most likely secondary to septic shock  Creatinine is much improved  Antibiotic at full dose  Monitor creatinine  6  Encephalopathy, most likely secondary to sepsis and Legionella pneumonia  Encephalopathy is a common manifestation of Legionella pneumonia  Mental status is much improved    No clinical signs of CNS infection  Monitor mental status  7  Nausea and diarrhea, POA, most likely secondary to Legionella pneumonia  GI symptoms are commonly seen in using on pneumonia  Monitor symptoms  8  Nicotine abuse  Recommend smoking cessation  9  Chronic alcohol use  Recommend stopping alcohol use  Hospitalization records reviewed in detail  Discussed with the patient and  in detail regarding the above plan  Discussed with Dr Toro Kennedy from [address] [facility]    Thank you for this consultation  We will follow along with you  HISTORY OF PRESENT ILLNESS:  Reason for Consult:  Legionella pneumonia  HPI: Kwasi Wilson is a 48 y o  female, with history of chronic alcohol use and nicotine abuse, came to the ER on 07/21 with 1 1/2 weeks of dyspnea and 4 days of nausea and diarrhea  On presentation, she had mild leukocytosis but no fever  No CXR was done  Patient was discharged home for presumptive viral gastroenteritis  Patient came back to the ER late evening 7/24 with worsening dyspnea and now with confusion  On presentation, patient now has fever, in addition to leukocytosis  She was also hypotensive  She also developed acute hypoxic respiratory failure, requiring intubation  Patient was admitted to ICU and started on broad-spectrum antibiotics  Patient improved on IV antibiotic  She was extubated after 24 hours  SBP has normalized  Legionella antigen is now positive  We are asked to evaluate the patient  Patient is currently on high-flow nasal cannula  She still has dyspnea, but much improved from admission  No further chills  No further nausea or diarrhea  Patient has 2 window AC units in her bedroom and dining room  Both of these units of 6to 8years old  Although patient's  states that washes the filter every year, he has not replaced the original filter  REVIEW OF SYSTEMS:  A complete system-based review was done    Except for what is noted in HPI above, ROS of systems is otherwise negative  PAST MEDICAL HISTORY:  No past medical history on file  Past Surgical History:   Procedure Laterality Date    CERVICAL BIOPSY  W/ LOOP ELECTRODE EXCISION       Problem list reviewed  FAMILY HISTORY:  Non-contributory    SOCIAL HISTORY:  Social History     Substance and Sexual Activity   Alcohol Use Not Currently    Frequency: 4 or more times a week    Comment: "was drinking daily, stopped saturday"     Social History     Substance and Sexual Activity   Drug Use No     Social History     Tobacco Use   Smoking Status Current Every Day Smoker    Types: Cigarettes   Smokeless Tobacco Never Used       ALLERGIES:  Allergies   Allergen Reactions    Amoxil [Amoxicillin] Rash       MEDICATIONS:  All current active medications have been reviewed  Patient is currently on vancomycin, cefepime, azithromycin  PHYSICAL EXAM:  Vitals:  Temp:  [98 6 °F (37 °C)-100 9 °F (38 3 °C)] 100 9 °F (38 3 °C)  HR:  [] 109  Resp:  [22-35] 35  BP: (112-136)/(69-85) 136/77  SpO2:  [93 %-100 %] 95 %  Temp (24hrs), Av 5 °F (37 5 °C), Min:98 6 °F (37 °C), Max:100 9 °F (38 3 °C)  Current: Temperature: (!) 100 9 °F (38 3 °C)     Physical Exam:  General:  Well-nourished, well-developed, in no acute distress  Awake, alert and oriented x 3  Eyes:  Conjunctive clear with no hemorrhages or effusions  Oropharynx:  No ulcers, no lesions, pharynx benign, no tonsillitis  Neck:  Supple, no lymphadenopathy, no mass, nontender  Lungs:  Expansion symmetric, diffuse rhonchi, left-sided rales, mild diffuse wheezing, no accessory muscle use  Cardiac:  Tachycardic with regular rhythm, normal S1, normal S2, no murmurs  Abdomen:  Soft, nondistended, non-tender, no HSM  Extremities:  Trace edema, no erythema, nontender  No ulcers  Skin:  No rashes, no ulcers  Neurological:  Moves all four extremities spontaneously, sensation grossly intact    LABS, IMAGING, & OTHER STUDIES:  Lab Results:   I have personally reviewed pertinent labs  Results from last 7 days   Lab Units 07/27/20  0615 07/26/20  0507 07/25/20 2015 07/25/20  0510 07/24/20 2118 07/21/20  2120   POTASSIUM mmol/L 3 2* 4 1 4 0   < > 2 9* 2 5* 2 4*   CHLORIDE mmol/L 108 110* 110*   < > 106 98* 96*   CO2 mmol/L 24 22 24   < > 22 27 27   BUN mg/dL 11 10 13   < > 16 15 10   CREATININE mg/dL 0 71 0 82 0 83   < > 1 21 1 54* 1 30   EGFR ml/min/1 73sq m 100 84 82   < > 52 39 48   CALCIUM mg/dL 7 9* 8 2* 7 8*   < > 6 0* 8 1* 7 4*   AST U/L  --   --   --   --  120* 132* 28   ALT U/L  --   --   --   --  20 25 14   ALK PHOS U/L  --   --   --   --  63 83 112    < > = values in this interval not displayed  Results from last 7 days   Lab Units 07/27/20  0615 07/26/20  0507 07/25/20  0510   WBC Thousand/uL 9 97 9 37 9 32   HEMOGLOBIN g/dL 9 8* 9 0* 9 1*   PLATELETS Thousands/uL 184 110* 104*     Results from last 7 days   Lab Units 07/25/20 0247 07/25/20 0238 07/25/20 0237 07/24/20 2118   BLOOD CULTURE   --   --   --  No Growth at 24 hrs  SPUTUM CULTURE  2+ Growth of   --   --   --    GRAM STAIN RESULT  No Epithelial cells per low power field  Rare Polys  No bacteria seen  --   --  Gram positive cocci in clusters*   MRSA CULTURE ONLY   --  No Methicillin Resistant Staphlyococcus aureus (MRSA) isolated  --   --    LEGIONELLA URINARY ANTIGEN   --   --  Positive*  --        Imaging Studies:   I have personally reviewed pertinent imaging study reports and images in PACS  CXR reviewed personally  BRITTON consolidations  Chest/abdomen/pelvis CT reviewed personally  BRITTON consolidation  Head CT reviewed personally  No acute changes  EKG, Pathology, and Other Studies:   I have personally reviewed pertinent reports

## 2020-07-27 NOTE — ASSESSMENT & PLAN NOTE
· Legionella urine antigen (+) 7/25/20  · Continue Azithromycin antibiotic day #3  · ID consultation  · Case reported to Department of Health

## 2020-07-27 NOTE — SOCIAL WORK
CM met with pt and son Cassidy Bowser at bedside  Pt lives with her  Tam Lan in a 3 story house with 1 GO and 2 flights (24 steps) to reach her bedroom  Pt is able to navigate steps and is independent with ADL's  She uses no DME's  She has gone to OP/PT through [facility], but was never an inpatient  She has never used HHC  Denies substance abuse  She does have some depression, but it is not treated with medication  She is a smoker-PPD-1 x 30 years  Pt uses [facility] in [place] and has no problem with co-pays  Dr Naomy Victoria is her PCP  She does not have a POA or Advanced Directive and does not want info at this itme  She works and drives  Her  will transport home when medically cleared  CM discussed d/c needs and pt is open to [facility] for PT/Nursing  Understands that she has freedom of choice  Referral will be placed to [facility]  CM will continue to follow  CM reviewed discharge planning process including the following: identifying help at home, patient preference for discharge planning needs, pharmacy preference, and availability of treatment team to discuss questions or concerns patient and/or family may have regarding understanding medications and recognizing signs and symptoms once discharged  CM also encouraged patient to follow up with all recommended appointments after discharge  Patient advised of importance for patient and family to participate in managing patients medical well being

## 2020-07-27 NOTE — PROGRESS NOTES
Vancomycin Assessment    Yuniel Lopez is a 48 y o  female who is currently receiving vancomycin vancomycin 750mg IV Q12H for Pneumonia     Relevant clinical data and objective history reviewed:  Creatinine   Date Value Ref Range Status   07/27/2020 0 71 0 60 - 1 30 mg/dL Final     Comment:     Standardized to IDMS reference method   07/26/2020 0 82 0 60 - 1 30 mg/dL Final     Comment:     Standardized to IDMS reference method   07/25/2020 0 83 0 60 - 1 30 mg/dL Final     Comment:     Standardized to IDMS reference method     /77   Pulse (!) 109   Temp (!) 100 9 °F (38 3 °C) (Bladder)   Resp (!) 35   Ht 5' 6" (1 676 m)   Wt 71 2 kg (156 lb 15 5 oz)   SpO2 95%   BMI 25 34 kg/m²   I/O last 3 completed shifts: In: 4421 3 [I V :3071 3; IV Piggyback:1350]  Out: 8862 [Urine:1585]  Lab Results   Component Value Date/Time    BUN 11 07/27/2020 06:15 AM    WBC 9 97 07/27/2020 06:15 AM    HGB 9 8 (L) 07/27/2020 06:15 AM    HCT 29 8 (L) 07/27/2020 06:15 AM    MCV 91 07/27/2020 06:15 AM     07/27/2020 06:15 AM     Temp Readings from Last 3 Encounters:   07/27/20 (!) 100 9 °F (38 3 °C) (Bladder)   07/21/20 99 7 °F (37 6 °C) (Oral)   02/03/20 98 °F (36 7 °C)     Vancomycin Days of Therapy: 4    Assessment/Plan  The patient is currently on vancomycin utilizing scheduled dosing  Baseline risks associated with therapy include: none   The patient is receiving vancomycin 750 mg IV q8h with the most recent vancomycin level being at steady-state and therapeutic based on a goal of 15-20 (appropriate for most indications) ; therefore, is clinically appropriate and dose will be continued   Pharmacy will continue to follow closely for s/sx of nephrotoxicity, infusion reactions and appropriateness of therapy  BMP and CBC will be ordered per protocol  Plan for repeat trough on 8/3/20  Pharmacy will continue to follow the patients culture results and clinical progress daily      Hal Shannon, Pharmacist

## 2020-07-27 NOTE — PHYSICAL THERAPY NOTE
Physical Therapy Evaluation     Patient's Name: Lyndon Rockwell    Admitting Diagnosis  Altered mental status [R41 82]  Pneumonia [J18 9]  Sepsis ([facility] [address] ) [A41 9]    Problem List  Patient Active Problem List   Diagnosis    Health maintenance examination    Insomnia    Hyperlipemia    Tobacco use    Severe sepsis ([facility] [address] )    Altered mental status    Left upper lobe pneumonia    Acute respiratory failure with hypoxia ([facility] [address] )    ABELARDO (acute kidney injury) ([facility] )    Legionella pneumonia ([facility] )       Past Medical History  No past medical history on file  Past Surgical History  Past Surgical History:   Procedure Laterality Date    CERVICAL BIOPSY  W/ LOOP ELECTRODE EXCISION              07/27/20 1040   Note Type   Note type Eval only   Pain Assessment   Pain Assessment Tool Pain Assessment not indicated - pt denies pain   Pain Score No Pain   Home Living   Type of Home House  (bi-level house)   Home Layout Two level;Stairs to enter with rails  (1 GO + 6 + 7 to main level)   Bathroom Shower/Tub Tub/shower unit   Bathroom Toilet Standard   Bathroom Equipment Other (Comment)  (no DME)   [address] Other (Comment)  (no DME/AD)   Prior Function   Level of [place] Independent with ADLs and functional mobility   Lives With Spouse; Family   Receives Help From Family   ADL Assistance Independent   IADLs Independent   Falls in the last 6 months 0   Vocational Part time employment   Comments patient drives   Restrictions/Precautions   Weight Bearing Precautions Per Order No   Braces or Orthoses Other (Comment)  (none at baseline)   Other Precautions Chair Alarm; Bed Alarm; Fall Risk;Telemetry;O2;Multiple lines  (HFNC)   General   Family/Caregiver Present Yes   Cognition   Overall Cognitive Status Impaired   Arousal/Participation Cooperative   Orientation Level Oriented to person;Oriented to place;Oriented to time;Disoriented to situation   Memory Decreased recall of recent events Following Commands Follows one step commands with increased time or repetition   Comments patient agreeable to PT eval   RUE Assessment   RUE Assessment WFL  (based on functional assessment)   LUE Assessment   LUE Assessment WFL  (based on functional assessment)   RLE Assessment   RLE Assessment WFL  (grossly assessed with functional mobility: at least 3+/5)   LLE Assessment   LLE Assessment WFL  (grossly assessed with functional mobility: at least 3+/5)   Coordination   Movements are Fluid and Coordinated 1   Sensation [facility]   Bed Mobility   Rolling R 4  Minimal assistance   Additional items Assist x 1; Increased time required;Verbal cues;LE management;HOB elevated; Bedrails   Rolling L 4  Minimal assistance   Additional items Assist x 1; Increased time required;Verbal cues;LE management;HOB elevated; Bedrails   Supine to Sit 4  Minimal assistance   Additional items Assist x 2; Increased time required;Verbal cues;LE management;HOB elevated   Transfers   Sit to Stand 4  Minimal assistance   Additional items Assist x 2; Increased time required;Verbal cues   Stand to Sit 4  Minimal assistance   Additional items Assist x 2; Increased time required;Verbal cues;Armrests   Ambulation/Elevation   Gait pattern Excessively slow; Short stride;Decreased foot clearance   Gait Assistance 4  Minimal assist   Additional items Assist x 2;Verbal cues   Assistive Device Rolling walker   Distance 4'   Stair Management Assistance Not tested   Balance   Static Sitting Fair +   Dynamic Sitting Fair   Static Standing Fair -   Dynamic Standing Poor +   Ambulatory Poor   Endurance Deficit   Endurance Deficit Yes   Activity Tolerance   Activity Tolerance Patient limited by fatigue   Medical Staff Made Aware FASRHAD Fisher   Nurse Made Aware CASPER [place] confirmed patient appropriate for therapy; present and aware of session outcomes   Assessment   Prognosis Fair   Problem List Decreased strength;Decreased endurance; Impaired balance;Decreased mobility; Decreased cognition   Assessment Pt is 48 y o  female seen for PT evaluation s/p admit to [facility] on 7/24/2020 w/ Acute respiratory failure with hypoxia ([facility] [address] )  PT consulted to assess pt's functional mobility and d/c needs  Order placed for PT eval and tx, w/ up w/ A order  Performed at least 2 patient identifiers during session: Name and wristband  Comorbidities affecting pt's physical performance at time of assessment include: Legionella pneumonia, ABELARDO, L upper lobe pneumonia, AMS, severe sepsis, tobacco use, hyperlipidemia  PTA, pt was independent w/ all functional mobility w/ no AD, ambulates unrestricted distances and all terrain, lives in multi-level home, has 1 GO, lives w/ family in bi-level house and works part time  Personal factors affecting pt at time of IE include: inaccessible home environment, ambulating w/ assistive device, stairs to enter home, inability to ambulate household distances, inability to navigate community distances, inability to navigate level surfaces w/o external assistance, unable to perform dynamic tasks in community, decreased cognition, inability to perform current job functions, inability to perform IADLs and inability to perform ADLs  Please find objective findings from PT assessment regarding body systems outlined above with impairments and limitations including weakness, impaired balance, decreased endurance, gait deviations, decreased activity tolerance, decreased functional mobility tolerance, fall risk and decreased cognition  The following objective measures performed on IE also reveal limitations: Barthel Index: 25/100 and Modified [place]: 4 (moderate/severe disability)  Pt's clinical presentation is currently unstable/unpredictable seen in pt's presentation of ongoing medical management/monitoring, evident in need for assist w/ all phases of mobility when usually mobilizing independently, and need for input for mobility technique/safety   Pt to benefit from continued PT tx to address deficits as defined above and maximize level of functional independent mobility and consistency  From PT/mobility standpoint, recommendation at time of d/c would be STR pending progress in order to facilitate return to PLOF  Barriers to Discharge Inaccessible home environment   Goals   Patient Goals "to go back to work"   [facility] Expiration Date 08/06/20   Short Term Goal #1 In 7-10 days: Increase bilateral LE strength 1/2 grade to facilitate independent mobility, Perform all bed mobility tasks modified independent to decrease caregiver burden, Perform all transfers modified independent to improve independence, Ambulate > 150 ft  with least restrictive assistive device modified independent w/o LOB and w/ normalized gait pattern 100% of the time, Navigate 7 stairs with distant S with unilateral handrail to facilitate return to previous living environment, Increase all balance 1 grade to decrease risk for falls, Complete exercise program independently and Tolerate 4 hr OOB to faciliate upright tolerance   PT Treatment Day 0   Plan   Treatment/Interventions Functional transfer training;LE strengthening/ROM; Elevations; Therapeutic exercise; Endurance training;Patient/family training;Bed mobility;Gait training;Equipment eval/education;Spoke to nursing;OT;Family   PT Frequency 5x/wk   Recommendation   PT Discharge Recommendation Post-Acute Rehabilitation Services   Equipment Recommended Walker   PT - OK to Discharge Yes  (when medically cleared; if to STR)   Modified Eduar Scale   Modified [place] Scale 4   Barthel Index   Feeding 5   Bathing 0   Grooming Score 0   Dressing Score 5   Bladder Score 0   Bowels Score 5   Toilet Use Score 5   Transfers (Bed/Chair) Score 5   Mobility (Level Surface) Score 0   Stairs Score 0   Barthel Index Score 25       Alo Zeng, PT, DPT

## 2020-07-27 NOTE — OCCUPATIONAL THERAPY NOTE
Occupational Therapy Evaluation        Patient Name: Silver MARTINEZQ'L Date: 7/27/2020 07/27/20 1018   Note Type   Note type Eval only   Restrictions/Precautions   Weight Bearing Precautions Per Order No   Braces or Orthoses Other (Comment)  (none at baseline)   Other Precautions Chair Alarm; Bed Alarm   Pain Assessment   Pain Assessment Tool Pain Assessment not indicated - pt denies pain   Pain Score No Pain   Home Living   Type of Home House   Home Layout Two level;Stairs to enter with rails  (1 GO + 6 + 7 to main level)   Bathroom Shower/Tub Tub/shower unit   Bathroom Toilet Standard   Bathroom Equipment Other (Comment)  (none at baseline)   [address] Other (Comment)  (none at baseline)   Prior Function   Level of [place] Independent with ADLs and functional mobility   Lives With Spouse; Family   Receives Help From Family   ADL Assistance Independent   IADLs Independent   Falls in the last 6 months 0   Vocational Part time employment   Lifestyle   Autonomy Patient and  reported independent with ADLs/ IADLs, ambulated with no AD, worked part time as a , also driving and physically active  Patient lives with her  and family in a  2 story house, 1 GO + 6 + 7 to main level  Reciprocal Relationships Supportive Family   Service to Others Part time    Psychosocial   Psychosocial (WDL) WDL   ADL   Eating Assistance   (NPO, amticipate no deficits in this area )   Grooming Assistance 4  Minimal Assistance   UB Bathing Assistance 3  Moderate Assistance   LB Bathing Assistance 2  Maximal Assistance   UB Dressing Assistance 3  Moderate Assistance   LB Dressing Assistance 2  Maximal [address]  2  Maximal Assistance   Functional Assistance 2  Maximal Assistance   Bed Mobility   Rolling R 4  Minimal assistance   Additional items Assist x 1;HOB elevated; Bedrails; Increased time required;Verbal cues   Rolling L 4  Minimal assistance   Additional items Assist x 1;HOB elevated; Bedrails; Increased time required;Verbal cues   Supine to Sit 4  Minimal assistance   Additional items Increased time required;Verbal cues;LE management;Assist x 2   Transfers   Sit to Stand 4  Minimal assistance   Additional items Assist x 2; Increased time required;Verbal cues   Stand to Sit 4  Minimal assistance   Additional items Assist x 2; Increased time required;Verbal cues   Functional Mobility   Functional Mobility 3  Moderate assistance   Additional items Rolling walker   Balance   Static Sitting Fair +   Dynamic Sitting Fair   Static Standing Fair -   Dynamic Standing Poor +   Activity Tolerance   Activity Tolerance Patient limited by fatigue   Nurse Made Aware CASPER Cornell confirmed patient appropriate for therapy; aware of session outcomes   RUE Assessment   RUE Assessment WFL   LUE Assessment   LUE Assessment WFL   Hand Function   Gross Motor Coordination Impaired   Fine Motor Coordination Functional   Sensation   Light Touch No apparent deficits  (BUEs)   Vision-Basic Assessment   Current Vision Wears glasses all the time   Patient Visual Report Other (Comment)  (No significant changes reported)   Cognition   Overall Cognitive Status Impaired   Arousal/Participation Alert; Cooperative   Attention Attends with cues to redirect   Orientation Level Oriented to person;Oriented to place; Disoriented to time   Memory Decreased recall of biographical information   Following Commands Follows one step commands with increased time or repetition   Assessment   Limitation Decreased ADL status; Decreased Safe judgement during ADL;Decreased endurance;Decreased self-care trans;Decreased high-level ADLs   Prognosis Good   Assessment Patient is a 48 y o  female seen for OT evaluation s/p admit to [address] [facility] on 7/24/2020 w/Acute respiratory failure with hypoxia ([facility] [address] )   Commorbidities affecting patient's functional performance at time of assessment include: Legionella pneumonia, ABELARDO, L upper lobe pneumonia, AMS, severe sepsis, tobacco use, hyperlipidemia  Orders placed for OT evaluation and treatment  Performed at least two patient identifiers during session including name and wristband  Prior to admission, Patient and  reported independent with ADLs/ IADLs, ambulated with no AD, worked part time as a , also driving and physically active  Patient lives with her  and family in a  2 story house, 1 [facility] + 6 + 7 to [facility] level  Personal factors affecting patient at time of initial evaluation include: inaccesible home environment, steps to enter, limited insight into deficits, decreased initiation and engagement, difficulty performing ADLs and difficulty performing IADLs  Upon evaluation, patient requires moderate assist for UB ADLs, maximal assist for LB ADLs, transfers and functional ambulation in room with minimal  assist x 2, with the use of Rolling Walker  Occupational performance is affected by the following deficits: orientation, decreased functional use of BUEs, impaired gross motor coordination, dynamic sit/ stand balance deficit with poor standing tolerance time for self care and functional mobility, decreased activity tolerance and postural control and postural alignment deficit, requiring external assistance to complete transitional movements  Therapist completed  extensive additional review of medical records and additional review of physical, cognitive or psychosocial history, clinical examination identifying 5 or more performance deficits, clinical decision making of a high complexity , consistent with a high complexity level evaluation  Patient to benefit from continued Occupational Therapy treatment while in the hospital to address deficits as defined above and maximize level of functional independence with ADLs and functional mobility   Occupational Performance areas to address include: grooming , bathing/ shower, dressing, toilet hygiene, transfer to all surfaces, functional ambulation, functional mobility, emergency response, health maintenance, medication routine/ management, IADLS: Household maintenance, IADLs: safety procedures, IADLs: meal prep/ clean up, Leisure Participation and Social participation  Goals   Patient Goals "to go back to work"   Plan   Treatment Interventions ADL retraining;Functional transfer training;UE strengthening/ROM; Endurance training;Patient/family training;Equipment evaluation/education; Compensatory technique education;Cardiac education; Activityengagement; Energy conservation;Continued evaluation   Goal Expiration Date 08/10/20   OT Frequency 3-5x/wk   Recommendation   OT Discharge Recommendation Post-Acute Rehabilitation Services   Barthel Index   Feeding 5   Bathing 0   Grooming Score 0   Dressing Score 5   Bladder Score 5   Bowels Score 5   Toilet Use Score 5   Transfers (Bed/Chair) Score 5   Mobility (Level Surface) Score 0   Stairs Score 0   Barthel Index Score 30   Modified [place] Scale   Modified [place] Scale 4     Occupational Therapy goals: In 7-14 days:       1- Patient will increase OOB/ sitting tolerance to 2-4 hours per day for increased participation in self care and leisure tasks with no s/s of exertion  2--Patient will increase standing tolerance time to 5 minutes with unilateral UE support to complete sink level ADLs@ min assist level  3- Patient will follow 100% simple one step directives without verbal cues  4- Patient will increase sitting tolerance at edge of bed to 20 minutes to complete UB ADLs @ min assist level  5- Patient will transfer bed to Chair / toilet with min assist with AD as indicated  6- Patient will complete UB ADLs with set up assist  7- Patient will complete LB ADLs with min assist with the use of adaptive equipment  8- Patient will complete toileting hygiene with mod assist assist/ supervision for thoroughness    9-Patient/ Family will demonstrate competency with UE Home Exercise Program

## 2020-07-28 PROBLEM — A41.9 SEVERE SEPSIS (HCC): Status: RESOLVED | Noted: 2020-07-25 | Resolved: 2020-07-28

## 2020-07-28 PROBLEM — R65.20 SEVERE SEPSIS (HCC): Status: RESOLVED | Noted: 2020-07-25 | Resolved: 2020-07-28

## 2020-07-28 LAB
ALBUMIN SERPL BCP-MCNC: 1.6 G/DL (ref 3.5–5)
ALP SERPL-CCNC: 102 U/L (ref 46–116)
ALT SERPL W P-5'-P-CCNC: 37 U/L (ref 12–78)
ANION GAP SERPL CALCULATED.3IONS-SCNC: 10 MMOL/L (ref 4–13)
AST SERPL W P-5'-P-CCNC: 159 U/L (ref 5–45)
BILIRUB SERPL-MCNC: 0.5 MG/DL (ref 0.2–1)
BUN SERPL-MCNC: 13 MG/DL (ref 5–25)
CALCIUM SERPL-MCNC: 7.4 MG/DL (ref 8.3–10.1)
CHLORIDE SERPL-SCNC: 111 MMOL/L (ref 100–108)
CO2 SERPL-SCNC: 27 MMOL/L (ref 21–32)
CREAT SERPL-MCNC: 0.61 MG/DL (ref 0.6–1.3)
GFR SERPL CREATININE-BSD FRML MDRD: 106 ML/MIN/1.73SQ M
GLUCOSE SERPL-MCNC: 100 MG/DL (ref 65–140)
GLUCOSE SERPL-MCNC: 118 MG/DL (ref 65–140)
POTASSIUM SERPL-SCNC: 3.2 MMOL/L (ref 3.5–5.3)
PROCALCITONIN SERPL-MCNC: 1.06 NG/ML
PROT SERPL-MCNC: 4.9 G/DL (ref 6.4–8.2)
SODIUM SERPL-SCNC: 148 MMOL/L (ref 136–145)

## 2020-07-28 PROCEDURE — 84145 PROCALCITONIN (PCT): CPT | Performed by: STUDENT IN AN ORGANIZED HEALTH CARE EDUCATION/TRAINING PROGRAM

## 2020-07-28 PROCEDURE — 94660 CPAP INITIATION&MGMT: CPT

## 2020-07-28 PROCEDURE — 99233 SBSQ HOSP IP/OBS HIGH 50: CPT | Performed by: NURSE PRACTITIONER

## 2020-07-28 PROCEDURE — 92526 ORAL FUNCTION THERAPY: CPT

## 2020-07-28 PROCEDURE — 94669 MECHANICAL CHEST WALL OSCILL: CPT

## 2020-07-28 PROCEDURE — 99232 SBSQ HOSP IP/OBS MODERATE 35: CPT | Performed by: INTERNAL MEDICINE

## 2020-07-28 PROCEDURE — 94640 AIRWAY INHALATION TREATMENT: CPT

## 2020-07-28 PROCEDURE — 80053 COMPREHEN METABOLIC PANEL: CPT | Performed by: NURSE PRACTITIONER

## 2020-07-28 PROCEDURE — 82948 REAGENT STRIP/BLOOD GLUCOSE: CPT

## 2020-07-28 PROCEDURE — 94760 N-INVAS EAR/PLS OXIMETRY 1: CPT

## 2020-07-28 RX ORDER — POTASSIUM CHLORIDE 20 MEQ/1
40 TABLET, EXTENDED RELEASE ORAL EVERY 4 HOURS
Status: COMPLETED | OUTPATIENT
Start: 2020-07-28 | End: 2020-07-28

## 2020-07-28 RX ADMIN — IPRATROPIUM BROMIDE 0.5 MG: 0.5 SOLUTION RESPIRATORY (INHALATION) at 19:47

## 2020-07-28 RX ADMIN — HEPARIN SODIUM 5000 UNITS: 5000 INJECTION INTRAVENOUS; SUBCUTANEOUS at 05:07

## 2020-07-28 RX ADMIN — POTASSIUM CHLORIDE 40 MEQ: 1500 TABLET, EXTENDED RELEASE ORAL at 14:24

## 2020-07-28 RX ADMIN — IPRATROPIUM BROMIDE 0.5 MG: 0.5 SOLUTION RESPIRATORY (INHALATION) at 08:03

## 2020-07-28 RX ADMIN — ATORVASTATIN CALCIUM 10 MG: 10 TABLET, FILM COATED ORAL at 17:50

## 2020-07-28 RX ADMIN — HEPARIN SODIUM 5000 UNITS: 5000 INJECTION INTRAVENOUS; SUBCUTANEOUS at 14:24

## 2020-07-28 RX ADMIN — LEVALBUTEROL HYDROCHLORIDE 1.25 MG: 1.25 SOLUTION, CONCENTRATE RESPIRATORY (INHALATION) at 08:03

## 2020-07-28 RX ADMIN — THIAMINE HCL TAB 100 MG 100 MG: 100 TAB at 09:40

## 2020-07-28 RX ADMIN — IPRATROPIUM BROMIDE 0.5 MG: 0.5 SOLUTION RESPIRATORY (INHALATION) at 13:37

## 2020-07-28 RX ADMIN — CHLORHEXIDINE GLUCONATE 0.12% ORAL RINSE 15 ML: 1.2 LIQUID ORAL at 20:43

## 2020-07-28 RX ADMIN — POTASSIUM CHLORIDE 40 MEQ: 1500 TABLET, EXTENDED RELEASE ORAL at 09:40

## 2020-07-28 RX ADMIN — LEVALBUTEROL HYDROCHLORIDE 1.25 MG: 1.25 SOLUTION, CONCENTRATE RESPIRATORY (INHALATION) at 19:47

## 2020-07-28 RX ADMIN — SODIUM CHLORIDE, SODIUM GLUCONATE, SODIUM ACETATE, POTASSIUM CHLORIDE, MAGNESIUM CHLORIDE, SODIUM PHOSPHATE, DIBASIC, AND POTASSIUM PHOSPHATE 75 ML/HR: .53; .5; .37; .037; .03; .012; .00082 INJECTION, SOLUTION INTRAVENOUS at 09:38

## 2020-07-28 RX ADMIN — CHLORHEXIDINE GLUCONATE 0.12% ORAL RINSE 15 ML: 1.2 LIQUID ORAL at 09:39

## 2020-07-28 RX ADMIN — Medication 400 MCG: at 09:39

## 2020-07-28 RX ADMIN — GUAIFENESIN 600 MG: 600 TABLET ORAL at 20:43

## 2020-07-28 RX ADMIN — GUAIFENESIN 600 MG: 600 TABLET ORAL at 09:40

## 2020-07-28 RX ADMIN — LEVALBUTEROL HYDROCHLORIDE 1.25 MG: 1.25 SOLUTION, CONCENTRATE RESPIRATORY (INHALATION) at 13:37

## 2020-07-28 RX ADMIN — AZITHROMYCIN MONOHYDRATE 500 MG: 500 INJECTION, POWDER, LYOPHILIZED, FOR SOLUTION INTRAVENOUS at 01:18

## 2020-07-28 RX ADMIN — HEPARIN SODIUM 5000 UNITS: 5000 INJECTION INTRAVENOUS; SUBCUTANEOUS at 21:05

## 2020-07-28 NOTE — PROGRESS NOTES
Progress Note - Infectious Disease   Hoang Williamson 48 y o  female MRN: [id number]  Unit/Bed#:  Encounter: [id number]      Impression/Recommendations:  1  Septic shock, POA  Source is most likely pneumonia  Patient is clinically much improved  SBP has normalized  Antibiotic plan as in below  Monitor temperature/WBC  Monitor hemodynamics      2  Legionella pneumonia  This is likely etiology of septic shock and acute hypoxic respiratory failure  Patient is clinically much improved on azithromycin  Source of Legionella is most likely patient's old AC units  Continue azithromycin  No further need for cefepime  I will discontinue  Monitor respiratory symptoms  Monitor temperature/WBC  Recommend that patient and  discard the old AC units or, at the least, discard the old filters      3  Acute hypoxic respiratory failure, secondary to pneumonia above  Patient was briefly intubated but is now extubated  She is still on high level O2 support via high-flow nasal cannula but is improving every day  O2 support per primary service  Monitor respiratory symptoms      4   Coagulase negative Staphylococcus bacteremia, with growth in wound 1 out of 2 admission blood cultures  This is most likely contaminant  No antibiotic needed for this  Follow-up on final blood culture results      5  ABELARDO, POA, most likely secondary to septic shock  Creatinine continues to improve  Antibiotic at full dose  Monitor creatinine      6  Encephalopathy, most likely secondary to sepsis and Legionella pneumonia  Encephalopathy is a common manifestation of Legionella pneumonia  Mental status is much improved, admission  No clinical signs of CNS infection  Monitor mental status      7  Nausea and diarrhea, POA, most likely secondary to Legionella pneumonia  GI symptoms are commonly seen in using on pneumonia  Monitor symptoms      8  Nicotine abuse  Recommend smoking cessation      9  Chronic alcohol use  Recommend stopping alcohol use      Discussed with the patient and  in detail regarding the above plan  Discussed with Dr Quyen Grewal from [address] Service    Antibiotics:  Azithromycin # 4    Subjective:  Patient remains in ICU  She remains on O2 support by high-flow NC  She feels better  Dyspnea improved  Cough improved  Mental status is good  Temperature stays down  No chills  She is tolerating antibiotic well  No nausea, vomiting or diarrhea  Objective:  Vitals:  Temp:  [98 7 °F (37 1 °C)-100 7 °F (38 2 °C)] 98 7 °F (37 1 °C)  HR:  [] 79  Resp:  [19-43] 19  BP: (110-121)/(65-82) 117/82  SpO2:  [86 %-96 %] 86 %  Temp (24hrs), Av 9 °F (37 7 °C), Min:98 7 °F (37 1 °C), Max:100 7 °F (38 2 °C)  Current: Temperature: 98 7 °F (37 1 °C)    Physical Exam:     General: Awake, alert, cooperative, no distress  Neck:  Supple  No mass  No lymphadenopathy  Lungs: Expansion symmetric, stable right-sided rhonchi and rales, no wheezing, respirations unlabored  Heart:  Regular rate and rhythm, S1 and S2 normal, no murmur  Abdomen: Soft, nondistended, non-tender, bowel sounds active all four quadrants, no masses, no organomegaly  Extremities: Trace edema  No erythema/warmth  No ulcer  Nontender to palpation  Skin:  No rash  Neuro: Moves all extremities  Invasive Devices     Peripheral Intravenous Line            Peripheral IV 20 Right Hand 3 days                Labs studies:   I have personally reviewed pertinent labs    Results from last 7 days   Lab Units 20  0606 20  0615 20  0507  20  0510 20  2118   POTASSIUM mmol/L 3 2* 3 2* 4 1   < > 2 9* 2 5*   CHLORIDE mmol/L 111* 108 110*   < > 106 98*   CO2 mmol/L 27 24 22   < > 22 27   BUN mg/dL 13 11 10   < > 16 15   CREATININE mg/dL 0 61 0 71 0 82   < > 1 21 1 54*   EGFR ml/min/1 73sq m 106 100 84   < > 52 39   CALCIUM mg/dL 7 4* 7 9* 8 2*   < > 6 0* 8 1*   AST U/L 159*  --   --   --  120* 132*   ALT U/L 37  --   --   --  20 25   ALK PHOS U/L 102  --   --   --  63 83    < > = values in this interval not displayed  Results from last 7 days   Lab Units 07/27/20  0615 07/26/20  0507 07/25/20  0510   WBC Thousand/uL 9 97 9 37 9 32   HEMOGLOBIN g/dL 9 8* 9 0* 9 1*   PLATELETS Thousands/uL 184 110* 104*     Results from last 7 days   Lab Units 07/25/20  0247 07/25/20  0238 07/25/20  0237 07/24/20 2118   BLOOD CULTURE   --   --   --  Staphylococcus coagulase negative*  No Growth at 48 hrs  SPUTUM CULTURE  2+ Growth of   --   --   --    GRAM STAIN RESULT  No Epithelial cells per low power field  Rare Polys  No bacteria seen  --   --  Gram positive cocci in clusters*   MRSA CULTURE ONLY   --  No Methicillin Resistant Staphlyococcus aureus (MRSA) isolated  --   --    LEGIONELLA URINARY ANTIGEN   --   --  Positive*  --        Imaging Studies:   I have personally reviewed pertinent imaging study reports and images in PACS  EKG, Pathology, and Other Studies:   I have personally reviewed pertinent reports

## 2020-07-28 NOTE — RESPIRATORY THERAPY NOTE
07/28/20 1118   Non-Invasive Information   Interface HFNC prongs   Non-Invasive Ventilation Mode HFNC   SpO2 94 %   $ Pulse Oximetry Spot Check Charge Completed   Non-Invasive Settings   FiO2 (%) 35   Flow (lpm) 40   Temperature (Set) 37

## 2020-07-28 NOTE — PROGRESS NOTES
Progress Note - Jannet Flaherty 1970, 48 y o  female MRN: [id number]    Unit/Bed#:  Encounter: [id number]    Primary Care Provider: Thang Parra MD   Date and time admitted to hospital: 7/24/2020  9:08 PM    * Acute respiratory failure with hypoxia ([facility] [address] )  Assessment & Plan  · Acute hypoxic respiratory failure in setting of profound left upper lobe pneumonia; now with culture data supporting Legionella PNA  · Continue supplemental O2 therapy to maintain O2 saturation >88%  · HFNC 45L35%  · Continue with aggressive chest physiotherapy  · Continue nebulizer therapy with Mucinex    Legionella pneumonia ([facility] [address] )  Assessment & Plan  · Legionella urine antigen (+) 7/25/20  · Continue Azithromycin antibiotic day #5  · Consider transitioning to oral   · ID consultation  · Case reported to Department of Health     Severe sepsis [facility])  Assessment & Plan  · Likely secondary to legionella  · Day 5 of azithromycin- can likely transition to oral tomorrow    Altered mental statusresolved as of 7/27/2020  Assessment & Plan  · Suspect toxic metabolic, now improved  · CT head negative for acute intracranial abnormality  · Frequent neuro checks  · Delirium precautions  Regulate sleep-wake cycles  Daily CAM ICU  Hyperlipemia  Assessment & Plan  · Atorvastatin 10 mg daily  · If LFTs worsen, consider holding    Tobacco use  Assessment & Plan  · Encourage smoking cessation  · Nicoderm patch while inpatient       ----------------------------------------------------------------------------------------  HPI/24hr events: Patient with slowly improving oxygen needs    Disposition: Improving  Code Status: Level 1 - Full Code  ---------------------------------------------------------------------------------------  SUBJECTIVE  "I'm feeling better"    Review of Systems   Constitutional: Positive for fatigue  HENT: Negative for trouble swallowing  Respiratory: Positive for cough and shortness of breath (improving)  Cardiovascular: Negative for chest pain  Gastrointestinal: Negative for abdominal pain  Musculoskeletal: Negative for back pain  Neurological: Negative for headaches        ---------------------------------------------------------------------------------------  OBJECTIVE    Vitals   Vitals:    20 1924 20 1936 20 2300 20 0009   BP: 121/73  110/65    BP Location: Left arm  Left arm    Pulse: 101  89    Resp:   20    Temp: 100 4 °F (38 °C)  99 1 °F (37 3 °C)    TempSrc:       SpO2: 95% 94% 96% 96%   Weight:       Height:         Temp (24hrs), Av 2 °F (37 9 °C), Min:99 1 °F (37 3 °C), Max:100 8 °F (38 2 °C)  Current: Temperature: 99 1 °F (37 3 °C)  Arterial Line BP: 125/66  Arterial Line MAP (mmHg): 88 mmHg    Respiratory:  SpO2: SpO2: 96 %, SpO2 Activity: SpO2 Activity: At Rest, SpO2 Device: O2 Device: High flow nasal cannula  Nasal Cannula O2 Flow Rate (L/min): 5 L/min    Invasive/non-invasive ventilation settings   Respiratory    Lab Data (Last 4 hours)    None         O2/Vent Data (Last 4 hours)      9          Non-Invasive Ventilation Mode HFNC                   Physical Exam   Constitutional: She is oriented to person, place, and time  She appears well-developed and well-nourished  She is cooperative  She appears ill  No distress  Nasal cannula in place  HENT:   Head: Normocephalic and atraumatic  Eyes: Pupils are equal, round, and reactive to light  Neck: No JVD present  No tracheal deviation present  Cardiovascular: Normal rate, regular rhythm and intact distal pulses  Pulmonary/Chest: Effort normal  No respiratory distress  She has rhonchi  Abdominal: Soft  Bowel sounds are normal  She exhibits no distension  There is no tenderness  Genitourinary:   Genitourinary Comments: Matt in place with yellow urine   Musculoskeletal: She exhibits no edema  Neurological: She is alert and oriented to person, place, and time  No cranial nerve deficit     Skin: Skin is warm and dry  She is not diaphoretic         Laboratory and Diagnostics:  Results from last 7 days   Lab Units 07/27/20  0615 07/26/20  0507 07/25/20  0510 07/25/20  0250 07/24/20 2118 07/21/20  2120   WBC Thousand/uL 9 97 9 37 9 32  --  14 33* 12 76*   HEMOGLOBIN g/dL 9 8* 9 0* 9 1*  --  12 3 12 3   HEMATOCRIT % 29 8* 27 5* 27 2*  --  36 6 36 2   PLATELETS Thousands/uL 184 110* 104* 98* 126* 155   NEUTROS PCT % 83* 83*  --   --   --  81*   BANDS PCT %  --   --  22*  --  2  --    MONOS PCT % 5 5  --   --   --  10   MONO PCT %  --   --  4  --  5  --      Results from last 7 days   Lab Units 07/27/20 0615 07/26/20 0507 07/25/20 2015 07/25/20  1154 07/25/20  0510 07/24/20 2118 07/21/20  2120   SODIUM mmol/L 144 143 143 140 140 138 136   POTASSIUM mmol/L 3 2* 4 1 4 0 4 1 2 9* 2 5* 2 4*   CHLORIDE mmol/L 108 110* 110* 109* 106 98* 96*   CO2 mmol/L 24 22 24 20* 22 27 27   ANION GAP mmol/L 12 11 9 11 12 13 13   BUN mg/dL 11 10 13 15 16 15 10   CREATININE mg/dL 0 71 0 82 0 83 1 03 1 21 1 54* 1 30   CALCIUM mg/dL 7 9* 8 2* 7 8* 7 3* 6 0* 8 1* 7 4*   GLUCOSE RANDOM mg/dL 87 91 102 179* 175* 128 109   ALT U/L  --   --   --   --  20 25 14   AST U/L  --   --   --   --  120* 132* 28   ALK PHOS U/L  --   --   --   --  63 83 112   ALBUMIN g/dL  --   --   --   --  1 5* 2 2* 2 5*   TOTAL BILIRUBIN mg/dL  --   --   --   --  0 90 0 80 0 60     Results from last 7 days   Lab Units 07/27/20  0615 07/25/20  2015 07/25/20  1154 07/25/20  0510   MAGNESIUM mg/dL 2 2 2 8* 2 8* 1 5*   PHOSPHORUS mg/dL  --   --   --  2 6*      Results from last 7 days   Lab Units 07/24/20 2118   INR  1 19   PTT seconds 45*      Results from last 7 days   Lab Units 07/21/20  2239   TROPONIN I ng/mL 0 02     Results from last 7 days   Lab Units 07/25/20  0510 07/25/20  0250 07/24/20  2311 07/24/20 2118   LACTIC ACID mmol/L 1 7 2 2* 2 5* 3 0*     ABG:  Results from last 7 days   Lab Units 07/26/20  0509   PH ART  7 471*   PCO2 ART mm Hg 29 4*   PO2 ART mm Hg 131 6*   HCO3 ART mmol/L 21 0*   BASE EXC ART mmol/L -2 0   ABG SOURCE  Radial, Left     VBG:  Results from last 7 days   Lab Units 07/26/20  0509   ABG SOURCE  Radial, Left     Results from last 7 days   Lab Units 07/27/20  0615 07/26/20  0515 07/24/20 2118   PROCALCITONIN ng/ml 2 33* 4 57* 6 76*       Micro  Results from last 7 days   Lab Units 07/25/20  0247 07/25/20  0238 07/25/20  0237 07/24/20  2118   BLOOD CULTURE   --   --   --  No Growth at 48 hrs  SPUTUM CULTURE  2+ Growth of   --   --   --    GRAM STAIN RESULT  No Epithelial cells per low power field  Rare Polys  No bacteria seen  --   --  Gram positive cocci in clusters*   MRSA CULTURE ONLY   --  No Methicillin Resistant Staphlyococcus aureus (MRSA) isolated  --   --    LEGIONELLA URINARY ANTIGEN   --   --  Positive*  --    STREP PNEUMONIAE ANTIGEN, URINE   --  Negative  --   --        EKG: Review of telemetry demonstrates sinus rhythm  Imaging: I have personally reviewed pertinent reports  and I have personally reviewed pertinent films in PACS    Intake and Output  I/O       07/26 0701 - 07/27 0700 07/27 0701 - 07/28 0700    P  O  0     I V  (mL/kg) 1800 (25 3) 1348 8 (18 9)    IV Piggyback 800 100    Total Intake(mL/kg) 2600 (36 5) 1448 8 (20 3)    Urine (mL/kg/hr) 1145 (0 7) 530 (0 3)    Total Output 1145 530    Net +1455 +918 8                Height and Weights   Height: 5' 6" (167 6 cm)  IBW: 59 3 kg  Body mass index is 25 34 kg/m²  Weight (last 2 days)     Date/Time   Weight    07/27/20 0600   71 2 (156 97)    07/26/20 0600   70 6 (155 65)                Nutrition       Diet Orders   (From admission, onward)             Start     Ordered    07/25/20 0139  Diet NPO  Diet effective now     Question Answer Comment   Diet Type NPO    RD to adjust diet per protocol?  Yes        07/25/20 0139                  Active Medications  Scheduled Meds:  Current Facility-Administered Medications:  acetaminophen 650 mg Oral Q6H PRN Cora Eason JOEL    atorvastatin 10 mg Oral Daily Otflavio Cameron PA-C    azithromycin 500 mg Intravenous Q24H Jerome Cameron PA-C Last Rate: 500 mg (07/25/20 0322)   chlorhexidine 15 mL Swish & Spit Q12H [facility] Bubba Calvo    folic acid 267 mcg Oral Daily Ted Corona MD    guaiFENesin 600 mg Oral Q12H [facility] Ted Corona MD    heparin (porcine) 5,000 Units Subcutaneous [facility] Jerome Cameron PA-C    ipratropium 0 5 mg Nebulization TID Ted Corona MD    levalbuterol 1 25 mg Nebulization TID Ted Corona MD    multi-electrolyte 500 mL Intravenous Once Jerome Cameron PA-C    multi-electrolyte 75 mL/hr Intravenous Continuous Jerome Cameron PA-C Last Rate: 75 mL/hr (07/27/20 1919)   nicotine 21 mg Transdermal Daily Jerome Cameron PA-C    thiamine 100 mg Oral Daily Ted Corona MD      Continuous Infusions:    multi-electrolyte 75 mL/hr Last Rate: 75 mL/hr (07/27/20 1919)     PRN Meds:     acetaminophen 650 mg Q6H PRN       Invasive Devices Review  Invasive Devices     Peripheral Intravenous Line            Peripheral IV 07/25/20 Right Hand 2 days    Peripheral IV 07/25/20 Right;Upper Arm 2 days          Drain            Urethral Catheter Temperature probe 16 Fr  3 days                Rationale for remaining devices: IV access, can discontinue catheter  ---------------------------------------------------------------------------------------  Advance Directive and Living Will:      Power of :    POLST:    ---------------------------------------------------------------------------------------  Care Time Delivered:   No Critical Care time spent       Vazquez Dress, CRNP      Portions of the record may have been created with voice recognition software  Occasional wrong word or "sound a like" substitutions may have occurred due to the inherent limitations of voice recognition software    Read the chart carefully and recognize, using context, where substitutions have occurred

## 2020-07-28 NOTE — SPEECH THERAPY NOTE
Speech Language/Pathology    Speech/Language Pathology Progress Note    Patient Name: Monica Ramsey  Today's Date: 7/28/2020    Subjective:  Patients  present throughout  She is now on 40lpm however continues to report mild increased SOB at rest  Per RN patent with difficulty when attempts made at taking her off HFNC      Objective:  Patients vocal quality continues to improve  She continues with congested respirations at rest and although subjective SOB is improved today her cough remains weak and not productive  She was seen with puree, honey thick and nectar thick today  Patient was able to self-feed with good bolus acceptance  Bolus formation and transfer were slow but functional with no significant oral residue noted   No overt s/s reduced oral control  Swallowing initiation appeared mildly delayed  Laryngeal rise was palpated and judged to be minimally reduced  Wet vocal quality was observed intermittently with nectar thick liquids today with increased wet respirations and increased SOB/fatigue reported  RR and WOB remained adequate throughout puree and HTL today with SPO2 stable throughout  Patient completed oral care post trials with significantly increased RR and WOB however was able to follow instructives to reduce aspiration risk   No sxs of aspiration observed      Assessment:  Patient continues with likely impaired respiratory swallow coordination with increased aspiration risk due to fatigue level and O2 demand however this appears to improved enough for po at this time      Plan/Recommendations:  Puree and honey thick liquids    Aspiration precautions and compensatory swallowing strategies: upright posture, only feed when fully alert, slow rate of feeding, small bites/sips, no straws, alternating bites and sips and take frequent breaks as needed due to fatigue     Will continue to follow     HUONG Smith , CCC-SLP  Speech Language Pathologist   Available via [address] [place] [address] #[id number]  [place] #[id number]

## 2020-07-28 NOTE — ASSESSMENT & PLAN NOTE
· Acute hypoxic respiratory failure in setting of profound left upper lobe pneumonia; now with culture data supporting Legionella PNA  · Continue supplemental O2 therapy to maintain O2 saturation >88%  · Transitioned to nasal cannula over the night  · Continue with aggressive chest physiotherapy  · Continue nebulizer therapy with Mucinex

## 2020-07-28 NOTE — ASSESSMENT & PLAN NOTE
· Legionella urine antigen (+) 7/25/20  · Continue Azithromycin antibiotic day #6  · Consider transitioning to oral   · ID consultation  · Case reported to Department of Health

## 2020-07-29 PROBLEM — E87.0 HYPERNATREMIA: Status: ACTIVE | Noted: 2020-07-29

## 2020-07-29 LAB
ANION GAP SERPL CALCULATED.3IONS-SCNC: 6 MMOL/L (ref 4–13)
BASOPHILS # BLD AUTO: 0.01 THOUSANDS/ΜL (ref 0–0.1)
BASOPHILS NFR BLD AUTO: 0 % (ref 0–1)
BUN SERPL-MCNC: 12 MG/DL (ref 5–25)
CA-I BLD-SCNC: 1.05 MMOL/L (ref 1.12–1.32)
CALCIUM SERPL-MCNC: 7.6 MG/DL (ref 8.3–10.1)
CHLORIDE SERPL-SCNC: 114 MMOL/L (ref 100–108)
CO2 SERPL-SCNC: 30 MMOL/L (ref 21–32)
CREAT SERPL-MCNC: 0.58 MG/DL (ref 0.6–1.3)
EOSINOPHIL # BLD AUTO: 0.04 THOUSAND/ΜL (ref 0–0.61)
EOSINOPHIL NFR BLD AUTO: 0 % (ref 0–6)
ERYTHROCYTE [DISTWIDTH] IN BLOOD BY AUTOMATED COUNT: 18.1 % (ref 11.6–15.1)
GFR SERPL CREATININE-BSD FRML MDRD: 108 ML/MIN/1.73SQ M
GLUCOSE SERPL-MCNC: 112 MG/DL (ref 65–140)
HCT VFR BLD AUTO: 26.9 % (ref 34.8–46.1)
HGB BLD-MCNC: 8.7 G/DL (ref 11.5–15.4)
IMM GRANULOCYTES # BLD AUTO: 0.24 THOUSAND/UL (ref 0–0.2)
IMM GRANULOCYTES NFR BLD AUTO: 2 % (ref 0–2)
LYMPHOCYTES # BLD AUTO: 1.65 THOUSANDS/ΜL (ref 0.6–4.47)
LYMPHOCYTES NFR BLD AUTO: 16 % (ref 14–44)
MAGNESIUM SERPL-MCNC: 2.3 MG/DL (ref 1.6–2.6)
MCH RBC QN AUTO: 30 PG (ref 26.8–34.3)
MCHC RBC AUTO-ENTMCNC: 32.3 G/DL (ref 31.4–37.4)
MCV RBC AUTO: 93 FL (ref 82–98)
MONOCYTES # BLD AUTO: 1.21 THOUSAND/ΜL (ref 0.17–1.22)
MONOCYTES NFR BLD AUTO: 12 % (ref 4–12)
NEUTROPHILS # BLD AUTO: 7.22 THOUSANDS/ΜL (ref 1.85–7.62)
NEUTS SEG NFR BLD AUTO: 70 % (ref 43–75)
NRBC BLD AUTO-RTO: 0 /100 WBCS
PHOSPHATE SERPL-MCNC: 2 MG/DL (ref 2.7–4.5)
PLATELET # BLD AUTO: 270 THOUSANDS/UL (ref 149–390)
PMV BLD AUTO: 12 FL (ref 8.9–12.7)
POTASSIUM SERPL-SCNC: 3.7 MMOL/L (ref 3.5–5.3)
PROCALCITONIN SERPL-MCNC: 0.63 NG/ML
RBC # BLD AUTO: 2.9 MILLION/UL (ref 3.81–5.12)
SODIUM SERPL-SCNC: 150 MMOL/L (ref 136–145)
WBC # BLD AUTO: 10.37 THOUSAND/UL (ref 4.31–10.16)

## 2020-07-29 PROCEDURE — 99232 SBSQ HOSP IP/OBS MODERATE 35: CPT | Performed by: ANESTHESIOLOGY

## 2020-07-29 PROCEDURE — 99232 SBSQ HOSP IP/OBS MODERATE 35: CPT | Performed by: INTERNAL MEDICINE

## 2020-07-29 PROCEDURE — 84100 ASSAY OF PHOSPHORUS: CPT | Performed by: PHYSICIAN ASSISTANT

## 2020-07-29 PROCEDURE — 94760 N-INVAS EAR/PLS OXIMETRY 1: CPT

## 2020-07-29 PROCEDURE — 83735 ASSAY OF MAGNESIUM: CPT | Performed by: PHYSICIAN ASSISTANT

## 2020-07-29 PROCEDURE — 94668 MNPJ CHEST WALL SBSQ: CPT

## 2020-07-29 PROCEDURE — 85025 COMPLETE CBC W/AUTO DIFF WBC: CPT | Performed by: PHYSICIAN ASSISTANT

## 2020-07-29 PROCEDURE — 80048 BASIC METABOLIC PNL TOTAL CA: CPT | Performed by: PHYSICIAN ASSISTANT

## 2020-07-29 PROCEDURE — 84145 PROCALCITONIN (PCT): CPT | Performed by: PHYSICIAN ASSISTANT

## 2020-07-29 PROCEDURE — 94640 AIRWAY INHALATION TREATMENT: CPT

## 2020-07-29 PROCEDURE — 82330 ASSAY OF CALCIUM: CPT | Performed by: PHYSICIAN ASSISTANT

## 2020-07-29 RX ORDER — AZITHROMYCIN 250 MG/1
500 TABLET, FILM COATED ORAL EVERY 24 HOURS
Status: DISCONTINUED | OUTPATIENT
Start: 2020-07-30 | End: 2020-07-29

## 2020-07-29 RX ORDER — AZITHROMYCIN 500 MG/1
500 TABLET, FILM COATED ORAL EVERY 24 HOURS
Status: DISCONTINUED | OUTPATIENT
Start: 2020-07-30 | End: 2020-08-04 | Stop reason: HOSPADM

## 2020-07-29 RX ORDER — CALCIUM GLUCONATE 20 MG/ML
2 INJECTION, SOLUTION INTRAVENOUS ONCE
Status: COMPLETED | OUTPATIENT
Start: 2020-07-29 | End: 2020-07-29

## 2020-07-29 RX ORDER — POTASSIUM CHLORIDE 20 MEQ/1
40 TABLET, EXTENDED RELEASE ORAL ONCE
Status: COMPLETED | OUTPATIENT
Start: 2020-07-29 | End: 2020-07-29

## 2020-07-29 RX ADMIN — IPRATROPIUM BROMIDE 0.5 MG: 0.5 SOLUTION RESPIRATORY (INHALATION) at 13:57

## 2020-07-29 RX ADMIN — IPRATROPIUM BROMIDE 0.5 MG: 0.5 SOLUTION RESPIRATORY (INHALATION) at 07:33

## 2020-07-29 RX ADMIN — LEVALBUTEROL HYDROCHLORIDE 1.25 MG: 1.25 SOLUTION, CONCENTRATE RESPIRATORY (INHALATION) at 20:07

## 2020-07-29 RX ADMIN — CALCIUM GLUCONATE 2 G: 20 INJECTION, SOLUTION INTRAVENOUS at 08:24

## 2020-07-29 RX ADMIN — LEVALBUTEROL HYDROCHLORIDE 1.25 MG: 1.25 SOLUTION, CONCENTRATE RESPIRATORY (INHALATION) at 07:33

## 2020-07-29 RX ADMIN — Medication 2 TABLET: at 15:28

## 2020-07-29 RX ADMIN — Medication 2 TABLET: at 08:22

## 2020-07-29 RX ADMIN — HEPARIN SODIUM 5000 UNITS: 5000 INJECTION INTRAVENOUS; SUBCUTANEOUS at 15:28

## 2020-07-29 RX ADMIN — GUAIFENESIN 600 MG: 600 TABLET ORAL at 08:23

## 2020-07-29 RX ADMIN — HEPARIN SODIUM 5000 UNITS: 5000 INJECTION INTRAVENOUS; SUBCUTANEOUS at 06:16

## 2020-07-29 RX ADMIN — ACETAMINOPHEN 650 MG: 325 TABLET, FILM COATED ORAL at 08:23

## 2020-07-29 RX ADMIN — CHLORHEXIDINE GLUCONATE 0.12% ORAL RINSE 15 ML: 1.2 LIQUID ORAL at 08:23

## 2020-07-29 RX ADMIN — AZITHROMYCIN MONOHYDRATE 500 MG: 500 INJECTION, POWDER, LYOPHILIZED, FOR SOLUTION INTRAVENOUS at 01:17

## 2020-07-29 RX ADMIN — Medication 400 MCG: at 08:22

## 2020-07-29 RX ADMIN — ATORVASTATIN CALCIUM 10 MG: 10 TABLET, FILM COATED ORAL at 15:28

## 2020-07-29 RX ADMIN — POTASSIUM CHLORIDE 40 MEQ: 1500 TABLET, EXTENDED RELEASE ORAL at 08:23

## 2020-07-29 RX ADMIN — THIAMINE HCL TAB 100 MG 100 MG: 100 TAB at 08:22

## 2020-07-29 RX ADMIN — LEVALBUTEROL HYDROCHLORIDE 1.25 MG: 1.25 SOLUTION, CONCENTRATE RESPIRATORY (INHALATION) at 13:57

## 2020-07-29 RX ADMIN — IPRATROPIUM BROMIDE 0.5 MG: 0.5 SOLUTION RESPIRATORY (INHALATION) at 20:07

## 2020-07-29 NOTE — ASSESSMENT & PLAN NOTE
· Legionella urine antigen (+) 7/25/20  · Completed azithromycin  · ID consultation  · Case reported to Department of Health

## 2020-07-29 NOTE — ASSESSMENT & PLAN NOTE
· Secondary to decreased PO intake due to dietary restriction due to hoarseness after extubation  · Monitor daily

## 2020-07-29 NOTE — UTILIZATION REVIEW
Continued Stay Review    Date:7/29                         Current Patient Class: IP Current Level of Care: ICU to MS     HPI:50 y o  female initially admitted on 7/25 w/ acute resp failure w/ hypoxia from pneumonia w/ cx supporting legionella PNA   Assessment/Plan: plan to cont O2 therapy via NC to keep sat > 88 %, on 5l    Cont aggressive chest PT , cont neb tx w/ mucinex  Cont azithromycin on day #6 , ID following   Cont w/ rhonchi   Order written to transfer out of ICU to [facility]       7/29 ID Note   Septic shock , most likely pneumonia , monitor temp and wbc   Cont azithromycin for legionella pneumonia  Down to 1 5l  From 5l earlier today   Pertinent Labs/Diagnostic Results:   Results from last 7 days   Lab Units 07/24/20 2127   SARS-COV-2  Negative     Results from last 7 days   Lab Units 07/29/20  0628 07/27/20  0615 07/26/20  0507 07/25/20  0510  07/24/20  2118   WBC Thousand/uL 10 37* 9 97 9 37 9 32  --  14 33*   HEMOGLOBIN g/dL 8 7* 9 8* 9 0* 9 1*  --  12 3   HEMATOCRIT % 26 9* 29 8* 27 5* 27 2*  --  36 6   PLATELETS Thousands/uL 270 184 110* 104*   < > 126*   NEUTROS ABS Thousands/µL 7 22 8 26* 7 78*  --   --   --    BANDS PCT %  --   --   --  22*  --  2    < > = values in this interval not displayed       Results from last 7 days   Lab Units 07/29/20  0628 07/28/20  0606 07/27/20  0615 07/26/20  0507 07/25/20 2015 07/25/20  1154 07/25/20  0510   SODIUM mmol/L 150* 148* 144 143 143 140 140   POTASSIUM mmol/L 3 7 3 2* 3 2* 4 1 4 0 4 1 2 9*   CHLORIDE mmol/L 114* 111* 108 110* 110* 109* 106   CO2 mmol/L 30 27 24 22 24 20* 22   ANION GAP mmol/L 6 10 12 11 9 11 12   BUN mg/dL 12 13 11 10 13 15 16   CREATININE mg/dL 0 58* 0 61 0 71 0 82 0 83 1 03 1 21   EGFR ml/min/1 73sq m 108 106 100 84 82 64 52   CALCIUM mg/dL 7 6* 7 4* 7 9* 8 2* 7 8* 7 3* 6 0*   CALCIUM, IONIZED mmol/L 1 05*  --   --   --   --  1 04* 0 92*   MAGNESIUM mg/dL 2 3  --  2 2  --  2 8* 2 8* 1 5*   PHOSPHORUS mg/dL 2 0*  --   --   --   -- --  2 6*     Results from last 7 days   Lab Units 07/28/20  0606 07/25/20  0510 07/24/20  2118   AST U/L 159* 120* 132*   ALT U/L 37 20 25   ALK PHOS U/L 102 63 83   TOTAL PROTEIN g/dL 4 9* 5 0* 6 8   ALBUMIN g/dL 1 6* 1 5* 2 2*   TOTAL BILIRUBIN mg/dL 0 50 0 90 0 80     Results from last 7 days   Lab Units 07/28/20  1233 07/27/20  0628 07/26/20  1818 07/26/20  1214 07/26/20  0606 07/26/20  0058   POC GLUCOSE mg/dl 118 92 107 107 90 88     Results from last 7 days   Lab Units 07/29/20  0628 07/28/20  0606 07/27/20  0615 07/26/20  0507 07/25/20  2015 07/25/20  1154 07/25/20  0510 07/24/20  2118   GLUCOSE RANDOM mg/dL 112 100 87 91 102 179* 175* 128             No results found for: BETA-HYDROXYBUTYRATE   Results from last 7 days   Lab Units 07/26/20  0509 07/25/20  1154 07/25/20  0250   PH ART  7 471* 7 366 7 339*   PCO2 ART mm Hg 29 4* 30 6* 35 7*   PO2 ART mm Hg 131 6* 111 1 363 0*   HCO3 ART mmol/L 21 0* 17 1* 18 8*   BASE EXC ART mmol/L -2 0 -7 3 -6 3   O2 CONTENT ART mL/dL 13 7* 12 0* 15 0*   O2 HGB, ARTERIAL % 97 2* 96 5 98 8*   ABG SOURCE  Radial, Left Line, Arterial Line, Arterial     Results from last 7 days   Lab Units 07/24/20 2118   PROTIME seconds 15 3*   INR  1 19   PTT seconds 45*     Results from last 7 days   Lab Units 07/29/20  0628 07/28/20  0530 07/27/20  0615 07/26/20  0515 07/24/20  2118   PROCALCITONIN ng/ml 0 63* 1 06* 2 33* 4 57* 6 76*     Results from last 7 days   Lab Units 07/25/20  0510 07/25/20  0250 07/24/20  2311 07/24/20  2118   LACTIC ACID mmol/L 1 7 2 2* 2 5* 3 0*     Results from last 7 days   Lab Units 07/25/20  0200   CLARITY UA  Cloudy   COLOR UA  Cecilia   SPEC GRAV UA  1 025   PH UA  5 5   GLUCOSE UA mg/dl 100 (1/10%)*   KETONES UA mg/dl Trace*   BLOOD UA  Large*   PROTEIN UA mg/dl >=300*   NITRITE UA  Negative   BILIRUBIN UA  Small*   UROBILINOGEN UA E U /dl 0 2   LEUKOCYTES UA  Negative   WBC UA /hpf 1-2*   RBC UA /hpf 2-4*   BACTERIA UA /hpf Occasional   EPITHELIAL CELLS WET PREP /hpf Occasional     Results from last 7 days   Lab Units 07/25/20  0238 07/25/20  0237   STREP PNEUMONIAE ANTIGEN, URINE  Negative  --    LEGIONELLA URINARY ANTIGEN   --  Positive*     Results from last 7 days   Lab Units 07/25/20  0247 07/24/20 2118   BLOOD CULTURE   --  No Growth After 4 Days    Staphylococcus coagulase negative*   SPUTUM CULTURE  2+ Growth of   --    GRAM STAIN RESULT  No Epithelial cells per low power field  Rare Polys  No bacteria seen Gram positive cocci in clusters*     Results from last 7 days   Lab Units 07/25/20  0510 07/24/20 2118   TOTAL COUNTED  100 100     Vital Signs:   07/29/20 1357            96 %    26    1 5 L/min  Nasal cannula     07/29/20 0800  98 2 °F (36 8 °C)  82  19  132/76  99              Lying   07/29/20 0733                24    1 L/min  Nasal cannula     07/29/20 0349            94 %    28    2 L/min  Nasal cannula     07/29/20 0230    91  18      95 %    28    2 L/min  Nasal cannula     07/29/20 0200    88  16      94 %    32    3 L/min  Nasal cannula     07/29/20 0100    76  20      97 %    36    4 L/min  Nasal cannula     07/29/20 0010            96 %    40    5 L/min  Nasal cannula     07/29/20 0005  98 °F (36 7 °C)  98  22  122/82    97 %                     Medications:   Scheduled Medications:    Medications:  atorvastatin 10 mg Oral Daily   [START ON 7/30/2020] azithromycin 500 mg Oral Q24H   chlorhexidine 15 mL Swish & Spit I28Z [facility]   folic acid 552 mcg Oral Daily   guaiFENesin 600 mg Oral Q12H ABIGAIL   heparin (porcine) 5,000 Units Subcutaneous Q8H [facility]   ipratropium 0 5 mg Nebulization TID   levalbuterol 1 25 mg Nebulization TID   multi-electrolyte 500 mL Intravenous Once   nicotine 21 mg Transdermal Daily   potassium-sodium phosphateS 2 tablet Oral 4x Daily (with meals and at bedtime)   thiamine 100 mg Oral Daily     Continuous IV Infusions:     PRN Meds:    acetaminophen 650 mg Oral Q6H PRN Discharge Plan: D    Network Utilization Review Department  [email or web address]  ATTENTION: Please call with any questions or concerns to [phone] and carefully listen to the prompts so that you are directed to the right person  All voicemails are confidential   Harlene Pair all requests for admission clinical reviews, approved or denied determinations and any other requests to dedicated fax number below belonging to the campus where the patient is receiving treatment   List of dedicated fax numbers for the Facilities:  [address] DENIALS (Administrative/Medical Necessity) [phone]   [address] (Maternity/NICU/Pediatrics) [phone]   Madelin Baeza [phone]   Thereasa Roys [phone]   Toy Kwasi [phone]   [facility] [phone]   [address] [place] [address] [phone]   [facility]  [phone]   [address] [place] [address], [address]  [address] [facility] [address] [facility] [phone]

## 2020-07-29 NOTE — PROGRESS NOTES
Progress Note - Infectious Disease   Claudine Page 48 y o  female MRN: [id number]  Unit/Bed#:  Encounter: [id number]      Impression/Recommendations:  1  Septic shock, POA   Source is most likely pneumonia   Patient is clinically much improved   SBP has normalized  Antibiotic plan as in below  Monitor temperature/WBC  Monitor hemodynamics      2  Legionella pneumonia   This is likely etiology of septic shock and acute hypoxic respiratory failure   Patient is clinically much improved on azithromycin   Source of Legionella is most likely patient's old AC units  Continue azithromycin  Monitor respiratory symptoms  Monitor temperature/WBC  Recommend that patient and  discard the old AC units or, at the least, discard the old filters  Treat x 14 days total      3  Acute hypoxic respiratory failure, secondary to pneumonia above   Patient was briefly intubated but is now extubated  She is much improved over last 24 hours  O2 support has decreased down to 2 L  O2 support per primary service  Monitor respiratory symptoms      4   Coagulase negative Staphylococcus bacteremia, with growth in wound 1 out of 2 admission blood cultures   This is most likely contaminant  No antibiotic needed for this  Follow-up on final blood culture results      5  ABELARDO, POA, most likely secondary to septic shock   Creatinine  has normalized  Antibiotic at full dose  Monitor creatinine      6  Encephalopathy, most likely secondary to sepsis and Legionella pneumonia   Encephalopathy is a common manifestation of Legionella pneumonia   Mental status is much improved, admission   No clinical signs of CNS infection  Monitor mental status      7  Nausea and diarrhea, POA, most likely secondary to Legionella pneumonia   GI symptoms are commonly seen in using on pneumonia    Monitor symptoms      8  Nicotine abuse   Recommend smoking cessation      9  Chronic alcohol use   Recommend stopping alcohol use      Discussed with the patient and  in detail regarding the above plan  Discussed with Critical Care Medicine Service earlier      Antibiotics:  Azithromycin # 5     Subjective:  Patient remains in ICU  Dyspnea is a lot better  O2 support much decreased, down to 2 L  Cough improving  Mental status is good  Temperature stays down  No chills  She is tolerating antibiotic well  No nausea, vomiting or diarrhea  Objective:  Vitals:  Temp:  [98 °F (36 7 °C)-98 7 °F (37 1 °C)] 98 2 °F (36 8 °C)  HR:  [76-98] 82  Resp:  [16-22] 19  BP: (122-132)/(76-82) 132/76  SpO2:  [93 %-97 %] 94 %  Temp (24hrs), Av 2 °F (36 8 °C), Min:98 °F (36 7 °C), Max:98 7 °F (37 1 °C)  Current: Temperature: 98 2 °F (36 8 °C)    Physical Exam:     General: Awake, alert, cooperative, no distress  Neck:  Supple  No mass  No lymphadenopathy  Lungs: Expansion symmetric, stable left-sided rales, no wheezing, respirations unlabored  Heart:  Regular rate and rhythm, S1 and S2 normal, no murmur  Abdomen: Soft, nondistended, non-tender, bowel sounds active all four quadrants, no masses, no organomegaly  Extremities: No edema  No erythema/warmth  No ulcer  Nontender to palpation  Skin:  No rash  Neuro: Moves all extremities  Invasive Devices     Peripheral Intravenous Line            Peripheral IV 20 Right Hand 4 days    Peripheral IV 20 Distal;Right;Ventral (anterior) Forearm less than 1 day                Labs studies:   I have personally reviewed pertinent labs    Results from last 7 days   Lab Units 20  0628 20  0606 20  0615  20  0510 20  2118   POTASSIUM mmol/L 3 7 3 2* 3 2*   < > 2 9* 2 5*   CHLORIDE mmol/L 114* 111* 108   < > 106 98*   CO2 mmol/L 30 27 24   < > 22 27   BUN mg/dL 12 13 11   < > 16 15   CREATININE mg/dL 0 58* 0 61 0 71   < > 1 21 1 54*   EGFR ml/min/1 73sq m 108 106 100   < > 52 39   CALCIUM mg/dL 7 6* 7 4* 7 9*   < > 6 0* 8 1*   AST U/L  --  159*  --   --  120* 132*   ALT U/L  --  37  --   --  20 25   ALK PHOS U/L  --  102  --   --  63 83    < > = values in this interval not displayed  Results from last 7 days   Lab Units 07/29/20  0628 07/27/20  0615 07/26/20  0507   WBC Thousand/uL 10 37* 9 97 9 37   HEMOGLOBIN g/dL 8 7* 9 8* 9 0*   PLATELETS Thousands/uL 270 184 110*     Results from last 7 days   Lab Units 07/25/20  0247 07/25/20  0238 07/25/20  0237 07/24/20 2118   BLOOD CULTURE   --   --   --  No Growth at 72 hrs  Staphylococcus coagulase negative*   SPUTUM CULTURE  2+ Growth of   --   --   --    GRAM STAIN RESULT  No Epithelial cells per low power field  Rare Polys  No bacteria seen  --   --  Gram positive cocci in clusters*   MRSA CULTURE ONLY   --  No Methicillin Resistant Staphlyococcus aureus (MRSA) isolated  --   --    LEGIONELLA URINARY ANTIGEN   --   --  Positive*  --        Imaging Studies:   I have personally reviewed pertinent imaging study reports and images in PACS  EKG, Pathology, and Other Studies:   I have personally reviewed pertinent reports

## 2020-07-29 NOTE — PROGRESS NOTES
Progress Note - Marcel Sam 1970, 48 y o  female MRN: [id number]    Unit/Bed#:  Encounter: [id number]    Primary Care Provider: Dre Gerard MD   Date and time admitted to hospital: 7/24/2020  9:08 PM    * Acute respiratory failure with hypoxia ([facility] [address] )  Assessment & Plan  · Acute hypoxic respiratory failure in setting of profound left upper lobe pneumonia; now with culture data supporting Legionella PNA  · Continue supplemental O2 therapy to maintain O2 saturation >88%  · Transitioned to nasal cannula over the night  · Continue with aggressive chest physiotherapy  · Continue nebulizer therapy with Mucinex    Legionella pneumonia ([facility] )  Assessment & Plan  · Legionella urine antigen (+) 7/25/20  · Continue Azithromycin antibiotic day #6  · Consider transitioning to oral   · ID consultation  · Case reported to Department of Health     Severe sepsis (HCC)resolved as of 7/28/2020  Assessment & Plan  · Likely secondary to legionella  · Day 5 of azithromycin- can likely transition to oral tomorrow    Hyperlipemia  Assessment & Plan  · Atorvastatin 10 mg daily  · If LFTs worsen, consider holding    Tobacco use  Assessment & Plan  · Encourage smoking cessation  · Nicoderm patch while inpatient       ----------------------------------------------------------------------------------------  HPI/24hr events: Patient transitioned to nasal cannula over night    Disposition: Improving  Code Status: Level 1 - Full Code  ---------------------------------------------------------------------------------------  SUBJECTIVE  "I feel alright"    Review of Systems   HENT: Negative for trouble swallowing  Respiratory: Positive for cough and shortness of breath (improving)  Cardiovascular: Negative for chest pain  Gastrointestinal: Negative for abdominal pain  Genitourinary: Negative for difficulty urinating  Musculoskeletal: Positive for back pain  Neurological: Negative for dizziness and headaches  ---------------------------------------------------------------------------------------  OBJECTIVE    Vitals   Vitals:    20 1948 20 0005 20 0010   BP:   122/82    BP Location:   Right arm    Pulse:   98    Resp:   22    Temp:   98 °F (36 7 °C)    TempSrc:   Oral    SpO2: 95% 94% 97% 96%   Weight:       Height:         Temp (24hrs), Av 4 °F (36 9 °C), Min:98 °F (36 7 °C), Max:99 °F (37 2 °C)  Current: Temperature: 98 °F (36 7 °C)  Arterial Line BP: 125/66  Arterial Line MAP (mmHg): 88 mmHg    Respiratory:  SpO2: SpO2: 96 %, SpO2 Activity: SpO2 Activity: At Rest, SpO2 Device: O2 Device: Nasal cannula  Nasal Cannula O2 Flow Rate (L/min): 5 L/min    Invasive/non-invasive ventilation settings   Respiratory    Lab Data (Last 4 hours)    None         O2/Vent Data (Last 4 hours)    None                Physical Exam   Constitutional: She is oriented to person, place, and time  She appears well-developed and well-nourished  No distress  Nasal cannula in place  HENT:   Head: Normocephalic and atraumatic  Eyes: Pupils are equal, round, and reactive to light  Neck: No JVD present  No tracheal deviation present  Cardiovascular: Normal rate, regular rhythm and intact distal pulses  Pulmonary/Chest: Effort normal  She has rhonchi  Abdominal: Soft  Bowel sounds are normal  She exhibits no distension  There is no tenderness  Musculoskeletal: Normal range of motion  She exhibits no edema or tenderness  Neurological: She is alert and oriented to person, place, and time  GCS eye subscore is 4  GCS verbal subscore is 5  GCS motor subscore is 6  Skin: Skin is warm and dry  She is not diaphoretic         Laboratory and Diagnostics:  Results from last 7 days   Lab Units 20  0615 20  0507 20  0510 20  0250 208   WBC Thousand/uL 9 97 9 37 9 32  --  14 33*   HEMOGLOBIN g/dL 9 8* 9 0* 9 1*  --  12 3   HEMATOCRIT % 29 8* 27 5* 27 2*  --  36 6 PLATELETS Thousands/uL 184 110* 104* 98* 126*   NEUTROS PCT % 83* 83*  --   --   --    BANDS PCT %  --   --  22*  --  2   MONOS PCT % 5 5  --   --   --    MONO PCT %  --   --  4  --  5     Results from last 7 days   Lab Units 07/28/20  0606 07/27/20  0615 07/26/20  0507 07/25/20 2015 07/25/20  1154 07/25/20  0510 07/24/20  2118   SODIUM mmol/L 148* 144 143 143 140 140 138   POTASSIUM mmol/L 3 2* 3 2* 4 1 4 0 4 1 2 9* 2 5*   CHLORIDE mmol/L 111* 108 110* 110* 109* 106 98*   CO2 mmol/L 27 24 22 24 20* 22 27   ANION GAP mmol/L 10 12 11 9 11 12 13   BUN mg/dL 13 11 10 13 15 16 15   CREATININE mg/dL 0 61 0 71 0 82 0 83 1 03 1 21 1 54*   CALCIUM mg/dL 7 4* 7 9* 8 2* 7 8* 7 3* 6 0* 8 1*   GLUCOSE RANDOM mg/dL 100 87 91 102 179* 175* 128   ALT U/L 37  --   --   --   --  20 25   AST U/L 159*  --   --   --   --  120* 132*   ALK PHOS U/L 102  --   --   --   --  63 83   ALBUMIN g/dL 1 6*  --   --   --   --  1 5* 2 2*   TOTAL BILIRUBIN mg/dL 0 50  --   --   --   --  0 90 0 80     Results from last 7 days   Lab Units 07/27/20  0615 07/25/20 2015 07/25/20  1154 07/25/20  0510   MAGNESIUM mg/dL 2 2 2 8* 2 8* 1 5*   PHOSPHORUS mg/dL  --   --   --  2 6*      Results from last 7 days   Lab Units 07/24/20  2118   INR  1 19   PTT seconds 45*          Results from last 7 days   Lab Units 07/25/20  0510 07/25/20  0250 07/24/20  2311 07/24/20  2118   LACTIC ACID mmol/L 1 7 2 2* 2 5* 3 0*     ABG:  Results from last 7 days   Lab Units 07/26/20  0509   PH ART  7 471*   PCO2 ART mm Hg 29 4*   PO2 ART mm Hg 131 6*   HCO3 ART mmol/L 21 0*   BASE EXC ART mmol/L -2 0   ABG SOURCE  Radial, Left     VBG:  Results from last 7 days   Lab Units 07/26/20  0509   ABG SOURCE  Radial, Left     Results from last 7 days   Lab Units 07/28/20  0530 07/27/20  0615 07/26/20  0515 07/24/20  2118   PROCALCITONIN ng/ml 1 06* 2 33* 4 57* 6 76*       Micro  Results from last 7 days   Lab Units 07/25/20  0247 07/25/20  0238 07/25/20  0237 07/24/20  2118   BLOOD CULTURE   --   --   --  No Growth at 72 hrs  Staphylococcus coagulase negative*   SPUTUM CULTURE  2+ Growth of   --   --   --    GRAM STAIN RESULT  No Epithelial cells per low power field  Rare Polys  No bacteria seen  --   --  Gram positive cocci in clusters*   MRSA CULTURE ONLY   --  No Methicillin Resistant Staphlyococcus aureus (MRSA) isolated  --   --    LEGIONELLA URINARY ANTIGEN   --   --  Positive*  --    STREP PNEUMONIAE ANTIGEN, URINE   --  Negative  --   --        EKG: Review of telemetry demonstrates sinus rhythm  Imaging: I have personally reviewed pertinent reports  and I have personally reviewed pertinent films in PACS    Intake and Output  I/O       07/27 0701 - 07/28 0700 07/28 0701 - 07/29 0700    P  O   120    I V  (mL/kg) 1798 8 (24)     IV Piggyback 100     Total Intake(mL/kg) 1898 8 (25 4) 120 (1 6)    Urine (mL/kg/hr) 705 (0 4) 600 (0 3)    Total Output 705 600    Net +1193 8 -480          Unmeasured Urine Occurrence  1 x          Height and Weights   Height: 5' 6" (167 6 cm)  IBW: 59 3 kg  Body mass index is 26 62 kg/m²  Weight (last 2 days)     Date/Time   Weight    07/28/20 0600   74 8 (164 9)    07/27/20 0600   71 2 (156 97)                Nutrition       Diet Orders   (From admission, onward)             Start     Ordered    07/28/20 1756  Diet Dysphagia/Modified Consistency; Dysphagia 1-Pureed; Nectar Thick Liquid  Diet effective now     Question Answer Comment   Diet Type Dysphagia/Modified Consistency    Dysphagia/Modified Consistency Dysphagia 1-Pureed    Liquid Modifier Nectar Thick Liquid    RD to adjust diet per protocol?  Yes        07/28/20 1755                  Active Medications  Scheduled Meds:  Current Facility-Administered Medications:  acetaminophen 650 mg Oral Q6H PRN JOEL Ny    atorvastatin 10 mg Oral Daily Amy Cordon PA-C    azithromycin 500 mg Intravenous Q24H Amy Cordon PA-C Last Rate: 500 mg (07/25/20 0322)   chlorhexidine 15 mL Swish & Spit Q12H [address] [place]    folic acid 401 mcg Oral Daily Heidi Gray MD    guaiFENesin 600 mg Oral Q12H [address] Heidi Gray MD    heparin (porcine) 5,000 Units Subcutaneous [facility] Ivette Kate PA-C    ipratropium 0 5 mg Nebulization TID Heidi Gray MD    levalbuterol 1 25 mg Nebulization TID Heidi Gray MD    multi-electrolyte 500 mL Intravenous Once Ivette Kate PA-C    nicotine 21 mg Transdermal Daily Ivette Kate PA-C    thiamine 100 mg Oral Daily Heidi Gray MD      Continuous Infusions:     PRN Meds:     acetaminophen 650 mg Q6H PRN       Invasive Devices Review  Invasive Devices     Peripheral Intravenous Line            Peripheral IV 07/25/20 Right Hand 3 days    Peripheral IV 07/28/20 Right Antecubital less than 1 day                Rationale for remaining devices: IV access  ---------------------------------------------------------------------------------------  Advance Directive and Living Will:      Power of :    POLST:    ---------------------------------------------------------------------------------------  Care Time Delivered:   No Critical Care time spent       JOEL Bang      Portions of the record may have been created with voice recognition software  Occasional wrong word or "sound a like" substitutions may have occurred due to the inherent limitations of voice recognition software    Read the chart carefully and recognize, using context, where substitutions have occurred

## 2020-07-30 PROBLEM — A41.9 SEPTIC SHOCK (HCC): Status: ACTIVE | Noted: 2020-07-30

## 2020-07-30 PROBLEM — R65.21 SEPTIC SHOCK (HCC): Status: ACTIVE | Noted: 2020-07-30

## 2020-07-30 PROBLEM — G93.40 ENCEPHALOPATHY: Status: ACTIVE | Noted: 2020-07-30

## 2020-07-30 LAB
ANION GAP SERPL CALCULATED.3IONS-SCNC: 6 MMOL/L (ref 4–13)
ANION GAP SERPL CALCULATED.3IONS-SCNC: 9 MMOL/L (ref 4–13)
ANISOCYTOSIS BLD QL SMEAR: PRESENT
BACTERIA BLD CULT: ABNORMAL
BACTERIA BLD CULT: NORMAL
BASOPHILS # BLD MANUAL: 0 THOUSAND/UL (ref 0–0.1)
BASOPHILS NFR MAR MANUAL: 0 % (ref 0–1)
BUN SERPL-MCNC: 11 MG/DL (ref 5–25)
BUN SERPL-MCNC: 8 MG/DL (ref 5–25)
CA-I BLD-SCNC: 1.12 MMOL/L (ref 1.12–1.32)
CALCIUM SERPL-MCNC: 8.2 MG/DL (ref 8.3–10.1)
CALCIUM SERPL-MCNC: 8.2 MG/DL (ref 8.3–10.1)
CHLORIDE SERPL-SCNC: 110 MMOL/L (ref 100–108)
CHLORIDE SERPL-SCNC: 115 MMOL/L (ref 100–108)
CO2 SERPL-SCNC: 28 MMOL/L (ref 21–32)
CO2 SERPL-SCNC: 31 MMOL/L (ref 21–32)
CREAT SERPL-MCNC: 0.65 MG/DL (ref 0.6–1.3)
CREAT SERPL-MCNC: 0.69 MG/DL (ref 0.6–1.3)
EOSINOPHIL # BLD MANUAL: 0 THOUSAND/UL (ref 0–0.4)
EOSINOPHIL NFR BLD MANUAL: 0 % (ref 0–6)
ERYTHROCYTE [DISTWIDTH] IN BLOOD BY AUTOMATED COUNT: 17.8 % (ref 11.6–15.1)
GFR SERPL CREATININE-BSD FRML MDRD: 102 ML/MIN/1.73SQ M
GFR SERPL CREATININE-BSD FRML MDRD: 104 ML/MIN/1.73SQ M
GLUCOSE SERPL-MCNC: 104 MG/DL (ref 65–140)
GLUCOSE SERPL-MCNC: 98 MG/DL (ref 65–140)
GRAM STN SPEC: ABNORMAL
HCT VFR BLD AUTO: 28.5 % (ref 34.8–46.1)
HGB BLD-MCNC: 9.1 G/DL (ref 11.5–15.4)
LYMPHOCYTES # BLD AUTO: 2.6 THOUSAND/UL (ref 0.6–4.47)
LYMPHOCYTES # BLD AUTO: 21 % (ref 14–44)
MAGNESIUM SERPL-MCNC: 1.9 MG/DL (ref 1.6–2.6)
MCH RBC QN AUTO: 29.6 PG (ref 26.8–34.3)
MCHC RBC AUTO-ENTMCNC: 31.9 G/DL (ref 31.4–37.4)
MCV RBC AUTO: 93 FL (ref 82–98)
MONOCYTES # BLD AUTO: 1.36 THOUSAND/UL (ref 0–1.22)
MONOCYTES NFR BLD: 11 % (ref 4–12)
MYELOCYTES NFR BLD MANUAL: 1 % (ref 0–1)
NEUTROPHILS # BLD MANUAL: 8.31 THOUSAND/UL (ref 1.85–7.62)
NEUTS SEG NFR BLD AUTO: 67 % (ref 43–75)
NRBC BLD AUTO-RTO: 0 /100 WBCS
PHOSPHATE SERPL-MCNC: 2.5 MG/DL (ref 2.7–4.5)
PLATELET # BLD AUTO: 319 THOUSANDS/UL (ref 149–390)
PLATELET BLD QL SMEAR: ADEQUATE
PMV BLD AUTO: 11.6 FL (ref 8.9–12.7)
POTASSIUM SERPL-SCNC: 3.1 MMOL/L (ref 3.5–5.3)
POTASSIUM SERPL-SCNC: 3.5 MMOL/L (ref 3.5–5.3)
PROCALCITONIN SERPL-MCNC: 0.36 NG/ML
RBC # BLD AUTO: 3.07 MILLION/UL (ref 3.81–5.12)
SODIUM SERPL-SCNC: 147 MMOL/L (ref 136–145)
SODIUM SERPL-SCNC: 152 MMOL/L (ref 136–145)
TOTAL CELLS COUNTED SPEC: 100
WBC # BLD AUTO: 12.4 THOUSAND/UL (ref 4.31–10.16)

## 2020-07-30 PROCEDURE — 85027 COMPLETE CBC AUTOMATED: CPT | Performed by: PHYSICIAN ASSISTANT

## 2020-07-30 PROCEDURE — 92526 ORAL FUNCTION THERAPY: CPT

## 2020-07-30 PROCEDURE — 80048 BASIC METABOLIC PNL TOTAL CA: CPT | Performed by: PHYSICIAN ASSISTANT

## 2020-07-30 PROCEDURE — 94640 AIRWAY INHALATION TREATMENT: CPT

## 2020-07-30 PROCEDURE — 99233 SBSQ HOSP IP/OBS HIGH 50: CPT | Performed by: INTERNAL MEDICINE

## 2020-07-30 PROCEDURE — 80048 BASIC METABOLIC PNL TOTAL CA: CPT | Performed by: STUDENT IN AN ORGANIZED HEALTH CARE EDUCATION/TRAINING PROGRAM

## 2020-07-30 PROCEDURE — 85007 BL SMEAR W/DIFF WBC COUNT: CPT | Performed by: PHYSICIAN ASSISTANT

## 2020-07-30 PROCEDURE — 82330 ASSAY OF CALCIUM: CPT | Performed by: PHYSICIAN ASSISTANT

## 2020-07-30 PROCEDURE — 94668 MNPJ CHEST WALL SBSQ: CPT

## 2020-07-30 PROCEDURE — 84145 PROCALCITONIN (PCT): CPT | Performed by: PHYSICIAN ASSISTANT

## 2020-07-30 PROCEDURE — 94760 N-INVAS EAR/PLS OXIMETRY 1: CPT

## 2020-07-30 PROCEDURE — 83735 ASSAY OF MAGNESIUM: CPT | Performed by: PHYSICIAN ASSISTANT

## 2020-07-30 PROCEDURE — 84100 ASSAY OF PHOSPHORUS: CPT | Performed by: PHYSICIAN ASSISTANT

## 2020-07-30 RX ORDER — DEXTROSE MONOHYDRATE 50 MG/ML
75 INJECTION, SOLUTION INTRAVENOUS CONTINUOUS
Status: DISCONTINUED | OUTPATIENT
Start: 2020-07-30 | End: 2020-07-30

## 2020-07-30 RX ORDER — FUROSEMIDE 10 MG/ML
20 INJECTION INTRAMUSCULAR; INTRAVENOUS DAILY
Status: DISCONTINUED | OUTPATIENT
Start: 2020-07-30 | End: 2020-08-02

## 2020-07-30 RX ORDER — ALBUMIN (HUMAN) 12.5 G/50ML
12.5 SOLUTION INTRAVENOUS EVERY 8 HOURS
Status: DISCONTINUED | OUTPATIENT
Start: 2020-07-30 | End: 2020-08-02

## 2020-07-30 RX ORDER — POTASSIUM CHLORIDE 14.9 MG/ML
20 INJECTION INTRAVENOUS ONCE
Status: COMPLETED | OUTPATIENT
Start: 2020-07-31 | End: 2020-08-01

## 2020-07-30 RX ADMIN — CHLORHEXIDINE GLUCONATE 0.12% ORAL RINSE 15 ML: 1.2 LIQUID ORAL at 08:51

## 2020-07-30 RX ADMIN — LEVALBUTEROL HYDROCHLORIDE 1.25 MG: 1.25 SOLUTION, CONCENTRATE RESPIRATORY (INHALATION) at 13:55

## 2020-07-30 RX ADMIN — Medication 400 MCG: at 08:51

## 2020-07-30 RX ADMIN — HEPARIN SODIUM 5000 UNITS: 5000 INJECTION INTRAVENOUS; SUBCUTANEOUS at 14:04

## 2020-07-30 RX ADMIN — ATORVASTATIN CALCIUM 10 MG: 10 TABLET, FILM COATED ORAL at 15:44

## 2020-07-30 RX ADMIN — IPRATROPIUM BROMIDE 0.5 MG: 0.5 SOLUTION RESPIRATORY (INHALATION) at 13:55

## 2020-07-30 RX ADMIN — FUROSEMIDE 20 MG: 10 INJECTION, SOLUTION INTRAMUSCULAR; INTRAVENOUS at 18:42

## 2020-07-30 RX ADMIN — IPRATROPIUM BROMIDE 0.5 MG: 0.5 SOLUTION RESPIRATORY (INHALATION) at 20:51

## 2020-07-30 RX ADMIN — GUAIFENESIN 600 MG: 600 TABLET ORAL at 08:51

## 2020-07-30 RX ADMIN — CHLORHEXIDINE GLUCONATE 0.12% ORAL RINSE 15 ML: 1.2 LIQUID ORAL at 22:07

## 2020-07-30 RX ADMIN — AZITHROMYCIN 500 MG: 500 TABLET, FILM COATED ORAL at 08:51

## 2020-07-30 RX ADMIN — HEPARIN SODIUM 5000 UNITS: 5000 INJECTION INTRAVENOUS; SUBCUTANEOUS at 22:07

## 2020-07-30 RX ADMIN — DEXTROSE 75 ML/HR: 5 SOLUTION INTRAVENOUS at 13:19

## 2020-07-30 RX ADMIN — THIAMINE HCL TAB 100 MG 100 MG: 100 TAB at 08:51

## 2020-07-30 RX ADMIN — HEPARIN SODIUM 5000 UNITS: 5000 INJECTION INTRAVENOUS; SUBCUTANEOUS at 05:23

## 2020-07-30 RX ADMIN — IPRATROPIUM BROMIDE 0.5 MG: 0.5 SOLUTION RESPIRATORY (INHALATION) at 08:26

## 2020-07-30 RX ADMIN — ALBUMIN (HUMAN) 12.5 G: 0.25 INJECTION, SOLUTION INTRAVENOUS at 18:42

## 2020-07-30 RX ADMIN — LEVALBUTEROL HYDROCHLORIDE 1.25 MG: 1.25 SOLUTION, CONCENTRATE RESPIRATORY (INHALATION) at 08:26

## 2020-07-30 RX ADMIN — LEVALBUTEROL HYDROCHLORIDE 1.25 MG: 1.25 SOLUTION, CONCENTRATE RESPIRATORY (INHALATION) at 20:51

## 2020-07-30 NOTE — PROGRESS NOTES
Progress Note - Infectious Disease   Stephen Blount 48 y o  female MRN: [id number]  Unit/Bed#: -01 Encounter: [id number]      Impression/Recommendations:  1  Septic shock, POA   Source is most likely pneumonia   Patient is clinically much improved   SBP has normalized  Antibiotic plan as in below  Monitor temperature/WBC  Monitor hemodynamics      2  Legionella pneumonia   This is likely etiology of septic shock and acute hypoxic respiratory failure   Patient is clinically much improved on azithromycin   Source of Legionella is most likely patient's old AC units  Patient's  did replace the 2 old AC units at home  Continue azithromycin  Monitor respiratory symptoms  Monitor temperature/WBC  Treat x 14 days total      3  Acute hypoxic respiratory failure, secondary to pneumonia above   Patient was briefly intubated but is now extubated  She is much improved over last 24 hours  O2 support has decreased down to 1 L  O2 support per primary service  Monitor respiratory symptoms      4   Coagulase negative Staphylococcus bacteremia, with growth in wound 1 out of 2 admission blood cultures   This is most likely contaminant  No antibiotic needed for this  Follow-up on final blood culture results      5  ABELARDO, POA, most likely secondary to septic shock   Creatinine  has normalized  Antibiotic at full dose  Monitor creatinine      6  Encephalopathy, most likely secondary to sepsis and Legionella pneumonia   Encephalopathy is a common manifestation of Legionella pneumonia   Mental status is much improved, admission   No clinical signs of CNS infection  Monitor mental status      7  Nausea and diarrhea, POA, most likely secondary to Legionella pneumonia   GI symptoms are commonly seen in using on pneumonia    Monitor symptoms      8  Nicotine abuse   Recommend smoking cessation      9  Chronic alcohol use   Recommend stopping alcohol use      Discussed with the patient and family in detail regarding the above plan  Discussed with Dr Roxanne Taylor from [facility] for discharge from ID viewpoint      Antibiotics:  Azithromycin # 6     Subjective:  Patient is out of ICU  She is down to 1 L O2 support  She has minimal dyspnea at rest but does have some dyspnea with exertion  Cough improving  Mental status is good  Temperature stays down   No chills  She is tolerating antibiotic well   No nausea, vomiting or diarrhea  Objective:  Vitals:  Temp:  [97 7 °F (36 5 °C)-98 2 °F (36 8 °C)] 98 1 °F (36 7 °C)  HR:  [85-97] 97  Resp:  [16-26] 20  BP: (114-144)/(72-89) 144/89  SpO2:  [89 %-96 %] 94 %  Temp (24hrs), Av °F (36 7 °C), Min:97 7 °F (36 5 °C), Max:98 2 °F (36 8 °C)  Current: Temperature: 98 1 °F (36 7 °C)    Physical Exam:     General: Awake, alert, cooperative, no distress  Neck:  Supple  No mass  No lymphadenopathy  Lungs: Expansion symmetric, stable left-sided rhonchi and rales, no wheezing, respirations unlabored  Heart:  Tachycardic but regular rhythm, S1 and S2 normal, no murmur  Abdomen: Soft, nondistended, non-tender, bowel sounds active all four quadrants, no masses, no organomegaly  Extremities: Trace edema  No erythema/warmth  No ulcer  Nontender to palpation  Skin:  No rash  Neuro: Moves all extremities  Invasive Devices     Peripheral Intravenous Line            Peripheral IV 20 Distal;Right;Ventral (anterior) Forearm 1 day                Labs studies:   I have personally reviewed pertinent labs    Results from last 7 days   Lab Units 20  0521 20  0628 20  0606  20  0510 20  2118   POTASSIUM mmol/L 3 5 3 7 3 2*   < > 2 9* 2 5*   CHLORIDE mmol/L 115* 114* 111*   < > 106 98*   CO2 mmol/L 28 30 27   < > 22 27   BUN mg/dL 11 12 13   < > 16 15   CREATININE mg/dL 0 69 0 58* 0 61   < > 1 21 1 54*   EGFR ml/min/1 73sq m 102 108 106   < > 52 39   CALCIUM mg/dL 8 2* 7 6* 7 4*   < > 6 0* 8 1*   AST U/L  --   --  159*  --  120* 132*   ALT U/L --   --  37  --  20 25   ALK PHOS U/L  --   --  102  --  63 83    < > = values in this interval not displayed  Results from last 7 days   Lab Units 07/30/20  0521 07/29/20  0628 07/27/20  0615   WBC Thousand/uL 12 40* 10 37* 9 97   HEMOGLOBIN g/dL 9 1* 8 7* 9 8*   PLATELETS Thousands/uL 319 270 184     Results from last 7 days   Lab Units 07/25/20  0247 07/25/20  0238 07/25/20  0237 07/24/20  2118   BLOOD CULTURE   --   --   --  No Growth After 5 Days  Micrococcus luteus*   SPUTUM CULTURE  2+ Growth of   --   --   --    GRAM STAIN RESULT  No Epithelial cells per low power field  Rare Polys  No bacteria seen  --   --  Gram positive cocci in clusters*   MRSA CULTURE ONLY   --  No Methicillin Resistant Staphlyococcus aureus (MRSA) isolated  --   --    LEGIONELLA URINARY ANTIGEN   --   --  Positive*  --        Imaging Studies:   I have personally reviewed pertinent imaging study reports and images in PACS  EKG, Pathology, and Other Studies:   I have personally reviewed pertinent reports

## 2020-07-30 NOTE — PROGRESS NOTES
Progress Note - Ciara Bai 1970, 48 y o  female MRN: [id number]    Unit/Bed#: -01 Encounter: [id number]    Primary Care Provider: Pipe Segovia MD   Date and time admitted to hospital: 7/24/2020  9:08 PM        * Septic shock [facility])  Assessment & Plan  Patient was admitted under ICU level of care for septic shock secondary to Legionella pneumonia  Now off of pressors and downgraded to med surge  Resolved  Doing well  O2 saturation 97-98% on 1L nasal cannula  Continue azithromycin for total 14 day course per ID recommendation  Continue respiratory protocol and pulmonary hygiene  Encephalopathy  Assessment & Plan  Likely secondary to septic shock  Improving  Hypernatremia  Assessment & Plan  Sodium noted 154  Bilateral lower extremity edema noted  No history of CHF reported  Likely in the settings of hypoalbuminemia  Continue protein supplement  Trial of IV lasix 20 mg + iv albumin   Monitor BMP   lower extremity compression stocking      Legionella pneumonia (HCC)  Assessment & Plan  Improving  Continue azithromycin course for refer neck per ID recommendation    Acute respiratory failure with hypoxia ([address] )  Assessment & Plan  Significantly better  Currently O2 saturation 97% on 1 L nasal cannula  Wean off of oxygen supplement as able  Tobacco use  Assessment & Plan  Counseled for smoking cessation  Hyperlipemia  Assessment & Plan  Continue statin  VTE Pharmacologic Prophylaxis:   Pharmacologic: Heparin    Patient Centered Rounds: I have performed bedside rounds with nursing staff today  Discussions with Specialists or Other Care Team Provider:  Infectious Disease    Education and Discussions with Family / Patient:   at bedside and discussed above at length  Time Spent for Care: 30 minutes  More than 50% of total time spent on counseling and coordination of care as described above      Current Length of Stay: 5 day(s)    Current Patient Status: Inpatient Certification Statement: The patient will continue to require additional inpatient hospital stay due to Above    Discharge Plan:  Expected in 24-48 hours    Code Status: Level 1 - Full Code      Subjective:   Downgraded from ICU  Afebrile, hemodynamically stable  Appears comfortable not in distress  O2 saturation 97% on 1 L nasal cannula  Patient reports feeling overall better  Denies chest pain, dyspnea, fever, chills, nausea vomiting, diarrhea at this time  No other events reported  Objective:     Vitals:   Temp (24hrs), Av °F (36 7 °C), Min:97 9 °F (36 6 °C), Max:98 2 °F (36 8 °C)    Temp:  [97 9 °F (36 6 °C)-98 2 °F (36 8 °C)] 97 9 °F (36 6 °C)  HR:  [80-97] 80  Resp:  [16-20] 18  BP: (120-144)/(77-89) 135/89  SpO2:  [89 %-96 %] 96 %  Body mass index is 26 87 kg/m²  Input and Output Summary (last 24 hours): Intake/Output Summary (Last 24 hours) at 2020 1742  Last data filed at 2020 1355  Gross per 24 hour   Intake 240 ml   Output 500 ml   Net -260 ml       Physical Exam:     Physical Exam   Constitutional: She is oriented to person, place, and time  No distress  HENT:   Head: Normocephalic and atraumatic  Eyes: Pupils are equal, round, and reactive to light  EOM are normal    Neck: Normal range of motion  Neck supple  No JVD present  Cardiovascular: Normal rate and regular rhythm  Pulmonary/Chest: Effort normal  No stridor  No respiratory distress  Rhonchorous lung sounds noted  Abdominal: Soft  Bowel sounds are normal  She exhibits no distension  There is no tenderness  Musculoskeletal: Normal range of motion  She exhibits edema (Bilateral lower extremity 3+ pitting edema noted  )  Neurological: She is alert and oriented to person, place, and time  Skin: She is not diaphoretic     Psychiatric: Her behavior is normal          Additional Data:     Labs:    Results from last 7 days   Lab Units 20  0521 20  0628  20  0510   WBC Thousand/uL 12 40* 10 37*   < > 9 32   HEMOGLOBIN g/dL 9 1* 8 7*   < > 9 1*   HEMATOCRIT % 28 5* 26 9*   < > 27 2*   PLATELETS Thousands/uL 319 270   < > 104*   BANDS PCT %  --   --   --  22*   NEUTROS PCT %  --  70   < >  --    LYMPHS PCT %  --  16   < >  --    LYMPHO PCT % 21  --   --  9*   MONOS PCT %  --  12   < >  --    MONO PCT % 11  --   --  4   EOS PCT % 0 0   < > 1    < > = values in this interval not displayed  Results from last 7 days   Lab Units 07/30/20  0521  07/28/20  0606   SODIUM mmol/L 152*   < > 148*   POTASSIUM mmol/L 3 5   < > 3 2*   CHLORIDE mmol/L 115*   < > 111*   CO2 mmol/L 28   < > 27   BUN mg/dL 11   < > 13   CREATININE mg/dL 0 69   < > 0 61   ANION GAP mmol/L 9   < > 10   CALCIUM mg/dL 8 2*   < > 7 4*   ALBUMIN g/dL  --   --  1 6*   TOTAL BILIRUBIN mg/dL  --   --  0 50   ALK PHOS U/L  --   --  102   ALT U/L  --   --  37   AST U/L  --   --  159*   GLUCOSE RANDOM mg/dL 98   < > 100    < > = values in this interval not displayed  Results from last 7 days   Lab Units 07/24/20  2118   INR  1 19     Results from last 7 days   Lab Units 07/28/20  1233 07/27/20  0628 07/26/20  1818 07/26/20  1214 07/26/20  0606 07/26/20  0058   POC GLUCOSE mg/dl 118 92 107 107 90 88         Results from last 7 days   Lab Units 07/30/20  0533 07/29/20  0628 07/28/20  0530 07/27/20  0615 07/26/20  0515 07/25/20  0510 07/25/20  0250 07/24/20  2311 07/24/20  2118   LACTIC ACID mmol/L  --   --   --   --   --  1 7 2 2* 2 5* 3 0*   PROCALCITONIN ng/ml 0 36* 0 63* 1 06* 2 33* 4 57*  --   --   --  6 76*           * I Have Reviewed All Lab Data Listed Above  * Additional Pertinent Lab Tests Reviewed: All Labs Within Last 24 Hours Reviewed      Recent Cultures (last 7 days):     Results from last 7 days   Lab Units 07/25/20  0247 07/25/20  0237 07/24/20  7006   BLOOD CULTURE   --   --  No Growth After 5 Days    Micrococcus luteus*   SPUTUM CULTURE  2+ Growth of   --   --    GRAM STAIN RESULT  No Epithelial cells per low power field  Rare Polys  No bacteria seen  --  Gram positive cocci in clusters*   LEGIONELLA URINARY ANTIGEN   --  Positive*  --        Last 24 Hours Medication List:     Current Facility-Administered Medications:  acetaminophen 650 mg Oral Q6H PRN NEUSIEDL, PA-C    atorvastatin 10 mg Oral Daily NEUSIEDL, PA-C    azithromycin 500 mg Oral Q24H NEUSIEDL, PA-C    chlorhexidine 15 mL Swish & Spit Q12H [address] NEUSIEDL, PA-C    dextrose 75 mL/hr Intravenous Continuous Js HOLT MD Last Rate: 75 mL/hr (78/00/63 1892)   folic acid 895 mcg Oral Daily NEUSIEDL, PA-C    guaiFENesin 600 mg Oral Q12H [address] NEUSIEDL, PA-C    heparin (porcine) 5,000 Units Subcutaneous [facility] [address] NEUSIEDL, PA-C    ipratropium 0 5 mg Nebulization TID NEUSIEDL, PA-C    levalbuterol 1 25 mg Nebulization TID NEUSIEDL, PA-C    nicotine 21 mg Transdermal Daily NEUSIEDL, PA-C    thiamine 100 mg Oral Daily NEUSIEDL, PA-C         Today, Patient Was Seen By: Rito Lopez MD    ** Please Note: Dictation voice to text software may have been used in the creation of this document   **

## 2020-07-30 NOTE — SPEECH THERAPY NOTE
Speech Language/Pathology    Speech/Language Pathology Progress Note    Patient Name: Zabrina Chun  Today's Date: 7/30/2020     Subjective:  Patient transferred out of the ICU doing much better today  She is now on NC requiring only 1L O2  Patient's voice has improved almost back to baseline reports only minimally hoarse now per patient/  She denies any further fatigue  SOB  Continues with mild congested cough although reports her cough is productive  Objective:  Patient consumed thin liquids and regular mixed consistency solids  Mastication was adequate with the materials administered today  Bolus formation and transfer were functional with nosignificant oral residue noted  No overt s/s reduced oral control  Swallowing initiation appeared prompt  Laryngeal rise was palpated and judged to be within functional limits  Cough was observed x2 during intake however this did not appear to be related to swallow function as it was also present at baseline  No additional coughing, throat clearing, change in vocal quality or respiratory status noted today  Assessment:  Patients oropharyngeal swallow function/respiratory swallow coordination appears significantly improved-likely at/near baseline   Aspiration risk is minimal     Plan/Recommendations:  Regular/thin liquids  meds as tolerated    Aspiration precautions and compensatory swallowing strategies: upright posture, only feed when fully alert, slow rate of feeding and small bites/sips    Will f/u x1    HUONG Handy S , [address] [facility], [place], [facility]  Speech Language Pathologist   Available via [address] [place] [address] #[id number]  [place] #[id number]

## 2020-07-30 NOTE — PLAN OF CARE
Problem: Prexisting or High Potential for Compromised Skin Integrity  Goal: Skin integrity is maintained or improved  Description  INTERVENTIONS:  - Identify patients at risk for skin breakdown  - Assess and monitor skin integrity  - Assess and monitor nutrition and hydration status  - Monitor labs   - Assess for incontinence   - Turn and reposition patient  - Assist with mobility/ambulation  - Relieve pressure over bony prominences  - Avoid friction and shearing  - Provide appropriate hygiene as needed including keeping skin clean and dry  - Evaluate need for skin moisturizer/barrier cream  - Collaborate with interdisciplinary team   - Patient/family teaching  - Consider wound care consult   Outcome: Progressing     Problem: Potential for Falls  Goal: Patient will remain free of falls  Description  INTERVENTIONS:  - Assess patient frequently for physical needs  -  Identify cognitive and physical deficits and behaviors that affect risk of falls  -  [place] fall precautions as indicated by assessment   - Educate patient/family on patient safety including physical limitations  - Instruct patient to call for assistance with activity based on assessment  - Modify environment to reduce risk of injury  - Consider OT/PT consult to assist with strengthening/mobility  Outcome: Progressing     Problem: Nutrition/Hydration-ADULT  Goal: Nutrient/Hydration intake appropriate for improving, restoring or maintaining nutritional needs  Description  Monitor and assess patient's nutrition/hydration status for malnutrition  Collaborate with interdisciplinary team and initiate plan and interventions as ordered  Monitor patient's weight and dietary intake as ordered or per policy  Utilize nutrition screening tool and intervene as necessary  Determine patient's food preferences and provide high-protein, high-caloric foods as appropriate       INTERVENTIONS:  - Monitor oral intake, urinary output, labs, and treatment plans  - Assess nutrition and hydration status and recommend course of action  - Evaluate amount of meals eaten  - Assist patient with eating if necessary   - Allow adequate time for meals  - Recommend/ encourage appropriate diets, oral nutritional supplements, and vitamin/mineral supplements  - Order, calculate, and assess calorie counts as needed  - Recommend, monitor, and adjust tube feedings based on assessed needs  - Assess need for intravenous fluids  - Provide nutrition/hydration education as appropriate  - Include patient/family/caregiver in decisions related to nutrition   Outcome: Progressing     Problem: PAIN - ADULT  Goal: Verbalizes/displays adequate comfort level or baseline comfort level  Description  Interventions:  - Encourage patient to monitor pain and request assistance  - Assess pain using appropriate pain scale  - Administer analgesics based on type and severity of pain and evaluate response  - Implement non-pharmacological measures as appropriate and evaluate response  - Consider cultural and social influences on pain and pain management  - Notify physician/advanced practitioner if interventions unsuccessful or patient reports new pain  Outcome: Progressing     Problem: INFECTION - ADULT  Goal: Absence or prevention of progression during hospitalization  Description  INTERVENTIONS:  - Assess and monitor for signs and symptoms of infection  - Monitor lab/diagnostic results  - Monitor all insertion sites, i e  indwelling lines, tubes, and drains  - Monitor endotracheal if appropriate and nasal secretions for changes in amount and color  - [place] appropriate cooling/warming therapies per order  - Administer medications as ordered  - Instruct and encourage patient and family to use good hand hygiene technique  - Identify and instruct in appropriate isolation precautions for identified infection/condition  Outcome: Progressing  Goal: Absence of fever/infection during neutropenic period  Description  INTERVENTIONS:  - Monitor WBC    Outcome: Progressing     Problem: SAFETY ADULT  Goal: Maintain or return to baseline ADL function  Description  INTERVENTIONS:  -  Assess patient's ability to carry out ADLs; assess patient's baseline for ADL function and identify physical deficits which impact ability to perform ADLs (bathing, care of mouth/teeth, toileting, grooming, dressing, etc )  - Assess/evaluate cause of self-care deficits   - Assess range of motion  - Assess patient's mobility; develop plan if impaired  - Assess patient's need for assistive devices and provide as appropriate  - Encourage maximum independence but intervene and supervise when necessary  - Involve family in performance of ADLs  - Assess for home care needs following discharge   - Consider OT consult to assist with ADL evaluation and planning for discharge  - Provide patient education as appropriate  Outcome: Progressing  Goal: Maintain or return mobility status to optimal level  Description  INTERVENTIONS:  - Assess patient's baseline mobility status (ambulation, transfers, stairs, etc )    - Identify cognitive and physical deficits and behaviors that affect mobility  - Identify mobility aids required to assist with transfers and/or ambulation (gait belt, sit-to-stand, lift, walker, cane, etc )  - [place] fall precautions as indicated by assessment  - Record patient progress and toleration of activity level on Mobility SBAR; progress patient to next Phase/Stage  - Instruct patient to call for assistance with activity based on assessment  - Consider rehabilitation consult to assist with strengthening/weightbearing, etc   Outcome: Progressing     Problem: DISCHARGE PLANNING  Goal: Discharge to home or other facility with appropriate resources  Description  INTERVENTIONS:  - Identify barriers to discharge w/patient and caregiver  - Arrange for needed discharge resources and transportation as appropriate  - Identify discharge learning needs (meds, wound care, etc )  - Arrange for interpretive services to assist at discharge as needed  - Refer to Case Management Department for coordinating discharge planning if the patient needs post-hospital services based on physician/advanced practitioner order or complex needs related to functional status, cognitive ability, or social support system  Outcome: Progressing     Problem: Knowledge Deficit  Goal: Patient/family/caregiver demonstrates understanding of disease process, treatment plan, medications, and discharge instructions  Description  Complete learning assessment and assess knowledge base    Interventions:  - Provide teaching at level of understanding  - Provide teaching via preferred learning methods  Outcome: Progressing     Problem: NEUROSENSORY - ADULT  Goal: Achieves stable or improved neurological status  Description  INTERVENTIONS  - Monitor and report changes in neurological status  - Monitor vital signs such as temperature, blood pressure, glucose, and any other labs ordered   - Initiate measures to prevent increased intracranial pressure  - Monitor for seizure activity and implement precautions if appropriate      Outcome: Progressing     Problem: RESPIRATORY - ADULT  Goal: Achieves optimal ventilation and oxygenation  Description  INTERVENTIONS:  - Assess for changes in respiratory status  - Assess for changes in mentation and behavior  - Position to facilitate oxygenation and minimize respiratory effort  - Oxygen administered by appropriate delivery if ordered  - Initiate smoking cessation education as indicated  - Encourage broncho-pulmonary hygiene including cough, deep breathe, Incentive Spirometry  - Assess the need for suctioning and aspirate as needed  - Assess and instruct to report SOB or any respiratory difficulty  - Respiratory Therapy support as indicated  Outcome: Progressing     Problem: GENITOURINARY - ADULT  Goal: Maintains or returns to baseline urinary function  Description  INTERVENTIONS:  - Assess urinary function  - Encourage oral fluids to ensure adequate hydration if ordered  - Administer IV fluids as ordered to ensure adequate hydration  - Administer ordered medications as needed  - Offer frequent toileting  - Follow urinary retention protocol if ordered  Outcome: Progressing  Goal: Urinary catheter remains patent  Description  INTERVENTIONS:  - Assess patency of urinary catheter  - If patient has a chronic restrepo, consider changing catheter if non-functioning  - Follow guidelines for intermittent irrigation of non-functioning urinary catheter  Outcome: Progressing

## 2020-07-30 NOTE — SOCIAL WORK
Cm informed by Ge Koehler that the pt was switched to Oral abx, she remains on O2 but her pneumonia is improving

## 2020-07-30 NOTE — PLAN OF CARE
Problem: Prexisting or High Potential for Compromised Skin Integrity  Goal: Skin integrity is maintained or improved  Description  INTERVENTIONS:  - Identify patients at risk for skin breakdown  - Assess and monitor skin integrity  - Assess and monitor nutrition and hydration status  - Monitor labs   - Assess for incontinence   - Turn and reposition patient  - Assist with mobility/ambulation  - Relieve pressure over bony prominences  - Avoid friction and shearing  - Provide appropriate hygiene as needed including keeping skin clean and dry  - Evaluate need for skin moisturizer/barrier cream  - Collaborate with interdisciplinary team   - Patient/family teaching  - Consider wound care consult   Outcome: Progressing     Problem: Potential for Falls  Goal: Patient will remain free of falls  Description  INTERVENTIONS:  - Assess patient frequently for physical needs  -  Identify cognitive and physical deficits and behaviors that affect risk of falls  -  [place] fall precautions as indicated by assessment   - Educate patient/family on patient safety including physical limitations  - Instruct patient to call for assistance with activity based on assessment  - Modify environment to reduce risk of injury  - Consider OT/PT consult to assist with strengthening/mobility  Outcome: Progressing     Problem: Nutrition/Hydration-ADULT  Goal: Nutrient/Hydration intake appropriate for improving, restoring or maintaining nutritional needs  Description  Monitor and assess patient's nutrition/hydration status for malnutrition  Collaborate with interdisciplinary team and initiate plan and interventions as ordered  Monitor patient's weight and dietary intake as ordered or per policy  Utilize nutrition screening tool and intervene as necessary  Determine patient's food preferences and provide high-protein, high-caloric foods as appropriate       INTERVENTIONS:  - Monitor oral intake, urinary output, labs, and treatment plans  - Assess nutrition and hydration status and recommend course of action  - Evaluate amount of meals eaten  - Assist patient with eating if necessary   - Allow adequate time for meals  - Recommend/ encourage appropriate diets, oral nutritional supplements, and vitamin/mineral supplements  - Order, calculate, and assess calorie counts as needed  - Recommend, monitor, and adjust tube feedings based on assessed needs  - Assess need for intravenous fluids  - Provide nutrition/hydration education as appropriate  - Include patient/family/caregiver in decisions related to nutrition   Outcome: Progressing     Problem: PAIN - ADULT  Goal: Verbalizes/displays adequate comfort level or baseline comfort level  Description  Interventions:  - Encourage patient to monitor pain and request assistance  - Assess pain using appropriate pain scale  - Administer analgesics based on type and severity of pain and evaluate response  - Implement non-pharmacological measures as appropriate and evaluate response  - Consider cultural and social influences on pain and pain management  - Notify physician/advanced practitioner if interventions unsuccessful or patient reports new pain  Outcome: Progressing     Problem: INFECTION - ADULT  Goal: Absence or prevention of progression during hospitalization  Description  INTERVENTIONS:  - Assess and monitor for signs and symptoms of infection  - Monitor lab/diagnostic results  - Monitor all insertion sites, i e  indwelling lines, tubes, and drains  - Monitor endotracheal if appropriate and nasal secretions for changes in amount and color  - [place] appropriate cooling/warming therapies per order  - Administer medications as ordered  - Instruct and encourage patient and family to use good hand hygiene technique  - Identify and instruct in appropriate isolation precautions for identified infection/condition  Outcome: Progressing  Goal: Absence of fever/infection during neutropenic period  Description  INTERVENTIONS:  - Monitor WBC    Outcome: Progressing     Problem: SAFETY ADULT  Goal: Maintain or return to baseline ADL function  Description  INTERVENTIONS:  -  Assess patient's ability to carry out ADLs; assess patient's baseline for ADL function and identify physical deficits which impact ability to perform ADLs (bathing, care of mouth/teeth, toileting, grooming, dressing, etc )  - Assess/evaluate cause of self-care deficits   - Assess range of motion  - Assess patient's mobility; develop plan if impaired  - Assess patient's need for assistive devices and provide as appropriate  - Encourage maximum independence but intervene and supervise when necessary  - Involve family in performance of ADLs  - Assess for home care needs following discharge   - Consider OT consult to assist with ADL evaluation and planning for discharge  - Provide patient education as appropriate  Outcome: Progressing  Goal: Maintain or return mobility status to optimal level  Description  INTERVENTIONS:  - Assess patient's baseline mobility status (ambulation, transfers, stairs, etc )    - Identify cognitive and physical deficits and behaviors that affect mobility  - Identify mobility aids required to assist with transfers and/or ambulation (gait belt, sit-to-stand, lift, walker, cane, etc )  - [place] fall precautions as indicated by assessment  - Record patient progress and toleration of activity level on Mobility SBAR; progress patient to next Phase/Stage  - Instruct patient to call for assistance with activity based on assessment  - Consider rehabilitation consult to assist with strengthening/weightbearing, etc   Outcome: Progressing     Problem: DISCHARGE PLANNING  Goal: Discharge to home or other facility with appropriate resources  Description  INTERVENTIONS:  - Identify barriers to discharge w/patient and caregiver  - Arrange for needed discharge resources and transportation as appropriate  - Identify discharge learning needs (meds, wound care, etc )  - Arrange for interpretive services to assist at discharge as needed  - Refer to Case Management Department for coordinating discharge planning if the patient needs post-hospital services based on physician/advanced practitioner order or complex needs related to functional status, cognitive ability, or social support system  Outcome: Progressing     Problem: Knowledge Deficit  Goal: Patient/family/caregiver demonstrates understanding of disease process, treatment plan, medications, and discharge instructions  Description  Complete learning assessment and assess knowledge base    Interventions:  - Provide teaching at level of understanding  - Provide teaching via preferred learning methods  Outcome: Progressing     Problem: NEUROSENSORY - ADULT  Goal: Achieves stable or improved neurological status  Description  INTERVENTIONS  - Monitor and report changes in neurological status  - Monitor vital signs such as temperature, blood pressure, glucose, and any other labs ordered   - Initiate measures to prevent increased intracranial pressure  - Monitor for seizure activity and implement precautions if appropriate      Outcome: Progressing     Problem: RESPIRATORY - ADULT  Goal: Achieves optimal ventilation and oxygenation  Description  INTERVENTIONS:  - Assess for changes in respiratory status  - Assess for changes in mentation and behavior  - Position to facilitate oxygenation and minimize respiratory effort  - Oxygen administered by appropriate delivery if ordered  - Initiate smoking cessation education as indicated  - Encourage broncho-pulmonary hygiene including cough, deep breathe, Incentive Spirometry  - Assess the need for suctioning and aspirate as needed  - Assess and instruct to report SOB or any respiratory difficulty  - Respiratory Therapy support as indicated  Outcome: Progressing     Problem: GENITOURINARY - ADULT  Goal: Maintains or returns to baseline urinary function  Description  INTERVENTIONS:  - Assess urinary function  - Encourage oral fluids to ensure adequate hydration if ordered  - Administer IV fluids as ordered to ensure adequate hydration  - Administer ordered medications as needed  - Offer frequent toileting  - Follow urinary retention protocol if ordered  Outcome: Progressing  Goal: Urinary catheter remains patent  Description  INTERVENTIONS:  - Assess patency of urinary catheter  - If patient has a chronic restrepo, consider changing catheter if non-functioning  - Follow guidelines for intermittent irrigation of non-functioning urinary catheter  Outcome: Progressing

## 2020-07-31 LAB
ANION GAP SERPL CALCULATED.3IONS-SCNC: 7 MMOL/L (ref 4–13)
BUN SERPL-MCNC: 6 MG/DL (ref 5–25)
CALCIUM SERPL-MCNC: 7.8 MG/DL (ref 8.3–10.1)
CHLORIDE SERPL-SCNC: 111 MMOL/L (ref 100–108)
CO2 SERPL-SCNC: 31 MMOL/L (ref 21–32)
CREAT SERPL-MCNC: 0.6 MG/DL (ref 0.6–1.3)
ERYTHROCYTE [DISTWIDTH] IN BLOOD BY AUTOMATED COUNT: 17.4 % (ref 11.6–15.1)
GFR SERPL CREATININE-BSD FRML MDRD: 107 ML/MIN/1.73SQ M
GLUCOSE SERPL-MCNC: 95 MG/DL (ref 65–140)
HCT VFR BLD AUTO: 27.3 % (ref 34.8–46.1)
HGB BLD-MCNC: 8.7 G/DL (ref 11.5–15.4)
MCH RBC QN AUTO: 29.4 PG (ref 26.8–34.3)
MCHC RBC AUTO-ENTMCNC: 31.9 G/DL (ref 31.4–37.4)
MCV RBC AUTO: 92 FL (ref 82–98)
NRBC BLD AUTO-RTO: 0 /100 WBCS
PLATELET # BLD AUTO: 310 THOUSANDS/UL (ref 149–390)
PMV BLD AUTO: 11.7 FL (ref 8.9–12.7)
POTASSIUM SERPL-SCNC: 3.4 MMOL/L (ref 3.5–5.3)
RBC # BLD AUTO: 2.96 MILLION/UL (ref 3.81–5.12)
SODIUM SERPL-SCNC: 149 MMOL/L (ref 136–145)
WBC # BLD AUTO: 12.52 THOUSAND/UL (ref 4.31–10.16)

## 2020-07-31 PROCEDURE — 92526 ORAL FUNCTION THERAPY: CPT

## 2020-07-31 PROCEDURE — 94668 MNPJ CHEST WALL SBSQ: CPT

## 2020-07-31 PROCEDURE — 99232 SBSQ HOSP IP/OBS MODERATE 35: CPT | Performed by: INTERNAL MEDICINE

## 2020-07-31 PROCEDURE — 80048 BASIC METABOLIC PNL TOTAL CA: CPT | Performed by: STUDENT IN AN ORGANIZED HEALTH CARE EDUCATION/TRAINING PROGRAM

## 2020-07-31 PROCEDURE — 94640 AIRWAY INHALATION TREATMENT: CPT

## 2020-07-31 PROCEDURE — 94760 N-INVAS EAR/PLS OXIMETRY 1: CPT

## 2020-07-31 PROCEDURE — 85027 COMPLETE CBC AUTOMATED: CPT | Performed by: STUDENT IN AN ORGANIZED HEALTH CARE EDUCATION/TRAINING PROGRAM

## 2020-07-31 PROCEDURE — 99232 SBSQ HOSP IP/OBS MODERATE 35: CPT | Performed by: STUDENT IN AN ORGANIZED HEALTH CARE EDUCATION/TRAINING PROGRAM

## 2020-07-31 RX ORDER — POTASSIUM CHLORIDE 20 MEQ/1
40 TABLET, EXTENDED RELEASE ORAL 2 TIMES DAILY
Status: DISCONTINUED | OUTPATIENT
Start: 2020-07-31 | End: 2020-08-02

## 2020-07-31 RX ORDER — GUAIFENESIN 100 MG/5ML
200 SOLUTION ORAL EVERY 4 HOURS PRN
Status: DISCONTINUED | OUTPATIENT
Start: 2020-07-31 | End: 2020-08-04 | Stop reason: HOSPADM

## 2020-07-31 RX ADMIN — POTASSIUM CHLORIDE 20 MEQ: 14.9 INJECTION, SOLUTION INTRAVENOUS at 00:05

## 2020-07-31 RX ADMIN — LEVALBUTEROL HYDROCHLORIDE 1.25 MG: 1.25 SOLUTION, CONCENTRATE RESPIRATORY (INHALATION) at 07:43

## 2020-07-31 RX ADMIN — ALBUMIN (HUMAN) 12.5 G: 0.25 INJECTION, SOLUTION INTRAVENOUS at 03:18

## 2020-07-31 RX ADMIN — LEVALBUTEROL HYDROCHLORIDE 1.25 MG: 1.25 SOLUTION, CONCENTRATE RESPIRATORY (INHALATION) at 13:39

## 2020-07-31 RX ADMIN — CHLORHEXIDINE GLUCONATE 0.12% ORAL RINSE 15 ML: 1.2 LIQUID ORAL at 08:07

## 2020-07-31 RX ADMIN — ATORVASTATIN CALCIUM 10 MG: 10 TABLET, FILM COATED ORAL at 16:49

## 2020-07-31 RX ADMIN — THIAMINE HCL TAB 100 MG 100 MG: 100 TAB at 08:07

## 2020-07-31 RX ADMIN — HEPARIN SODIUM 5000 UNITS: 5000 INJECTION INTRAVENOUS; SUBCUTANEOUS at 13:32

## 2020-07-31 RX ADMIN — CHLORHEXIDINE GLUCONATE 0.12% ORAL RINSE 15 ML: 1.2 LIQUID ORAL at 22:19

## 2020-07-31 RX ADMIN — HEPARIN SODIUM 5000 UNITS: 5000 INJECTION INTRAVENOUS; SUBCUTANEOUS at 22:19

## 2020-07-31 RX ADMIN — IPRATROPIUM BROMIDE 0.5 MG: 0.5 SOLUTION RESPIRATORY (INHALATION) at 20:44

## 2020-07-31 RX ADMIN — HEPARIN SODIUM 5000 UNITS: 5000 INJECTION INTRAVENOUS; SUBCUTANEOUS at 05:29

## 2020-07-31 RX ADMIN — ALBUMIN (HUMAN) 12.5 G: 0.25 INJECTION, SOLUTION INTRAVENOUS at 09:19

## 2020-07-31 RX ADMIN — FUROSEMIDE 20 MG: 10 INJECTION, SOLUTION INTRAMUSCULAR; INTRAVENOUS at 08:07

## 2020-07-31 RX ADMIN — AZITHROMYCIN 500 MG: 500 TABLET, FILM COATED ORAL at 08:07

## 2020-07-31 RX ADMIN — Medication 400 MCG: at 09:19

## 2020-07-31 RX ADMIN — IPRATROPIUM BROMIDE 0.5 MG: 0.5 SOLUTION RESPIRATORY (INHALATION) at 07:43

## 2020-07-31 RX ADMIN — LEVALBUTEROL HYDROCHLORIDE 1.25 MG: 1.25 SOLUTION, CONCENTRATE RESPIRATORY (INHALATION) at 20:44

## 2020-07-31 RX ADMIN — IPRATROPIUM BROMIDE 0.5 MG: 0.5 SOLUTION RESPIRATORY (INHALATION) at 13:39

## 2020-07-31 RX ADMIN — POTASSIUM CHLORIDE 40 MEQ: 1500 TABLET, EXTENDED RELEASE ORAL at 17:46

## 2020-07-31 RX ADMIN — ALBUMIN (HUMAN) 12.5 G: 0.25 INJECTION, SOLUTION INTRAVENOUS at 17:47

## 2020-07-31 RX ADMIN — GUAIFENESIN 600 MG: 600 TABLET ORAL at 08:07

## 2020-07-31 NOTE — ASSESSMENT & PLAN NOTE
Significantly better    Currently O2 saturation 97% on 1 L nasal cannula  Wean off of oxygen supplement as able

## 2020-07-31 NOTE — PROGRESS NOTES
Progress Note - Radha Jane 1970, 48 y o  female MRN: [id number]    Unit/Bed#: -01 Encounter: [id number]    Primary Care Provider: Eliot William MD   Date and time admitted to hospital: 7/24/2020  9:08 PM        * Septic shock [facility])  Assessment & Plan  Patient was admitted under ICU level of care for septic shock secondary to Legionella pneumonia  Now off of pressors and downgraded to med surge  Resolved  Doing well  O2 saturation 97-98% on 1L nasal cannula  Continue azithromycin for total 14 day course per ID recommendation  Continue respiratory protocol and pulmonary hygiene  Encephalopathy  Assessment & Plan  Likely secondary to septic shock  Improving        Hypernatremia  Assessment & Plan  Sodium noted 149  improving  Bilateral lower extremity edema noted  No history of CHF reported  Likely in the settings of hypoalbuminemia  Continue protein supplement  Trial of IV lasix 20 mg + iv albumin   Monitor BMP   lower extremity compression stocking    Legionella pneumonia (HCC)  Assessment & Plan  Improving  Continue azithromycin course for refer neck per ID recommendation    Acute respiratory failure with hypoxia ([address] )  Assessment & Plan  Significantly better  Currently O2 saturation 97% on 1 L nasal cannula  Wean off of oxygen supplement as able        Tobacco use  Assessment & Plan  Counseled for smoking cessation  Hyperlipemia  Assessment & Plan  Continue statin          VTE Pharmacologic Prophylaxis:   Pharmacologic: Heparin  Patient Centered Rounds: I have performed bedside rounds with nursing staff today  Discussions with Specialists or Other Care Team Provider: ID    Education and Discussions with Family / Patient: Mother at bedside  Time Spent for Care: 30 minutes  More than 50% of total time spent on counseling and coordination of care as described above      Current Length of Stay: 6 day(s)    Current Patient Status: Inpatient   Certification Statement: The patient will continue to require additional inpatient hospital stay due to above    Discharge Plan: TBD    Code Status: Level 1 - Full Code      Subjective:   Afebrile, hemodynamically stable  O2 saturation 93-94% on 1-2 L nasal cannula  Appears comfortable not in distress  Patient reports feeling overall better  Denies any new complaints  No other events reported  Objective:     Vitals:   Temp (24hrs), Av 2 °F (36 8 °C), Min:98 °F (36 7 °C), Max:98 6 °F (37 °C)    Temp:  [98 °F (36 7 °C)-98 6 °F (37 °C)] 98 1 °F (36 7 °C)  HR:  [76-89] 89  Resp:  [17-19] 19  BP: (116-144)/(77-79) 116/77  SpO2:  [88 %-95 %] 94 %  Body mass index is 26 69 kg/m²  Input and Output Summary (last 24 hours): Intake/Output Summary (Last 24 hours) at 2020 1727  Last data filed at 2020 1335  Gross per 24 hour   Intake 600 ml   Output 1600 ml   Net -1000 ml       Physical Exam:     Physical Exam   Constitutional: She is oriented to person, place, and time  No distress  HENT:   Head: Normocephalic and atraumatic  Eyes: Pupils are equal, round, and reactive to light  EOM are normal    Neck: Normal range of motion  Neck supple  No JVD present  Cardiovascular: Normal rate and regular rhythm  Pulmonary/Chest: Effort normal  No stridor  No respiratory distress  Rhonchorous lung sounds noted  Abdominal: Soft  Bowel sounds are normal  She exhibits no distension  There is no tenderness  Musculoskeletal: Normal range of motion  She exhibits edema (Bilateral lower extremity 3+ pitting edema noted  )  Neurological: She is alert and oriented to person, place, and time  Skin: She is not diaphoretic     Psychiatric: Her behavior is normal        Additional Data:     Labs:    Results from last 7 days   Lab Units 20  0529 20  0521 20  0628  20  0510   WBC Thousand/uL 12 52* 12 40* 10 37*   < > 9 32   HEMOGLOBIN g/dL 8 7* 9 1* 8 7*   < > 9 1*   HEMATOCRIT % 27 3* 28 5* 26 9*   < > 27 2* PLATELETS Thousands/uL 310 319 270   < > 104*   BANDS PCT %  --   --   --   --  22*   NEUTROS PCT %  --   --  70   < >  --    LYMPHS PCT %  --   --  16   < >  --    LYMPHO PCT %  --  21  --   --  9*   MONOS PCT %  --   --  12   < >  --    MONO PCT %  --  11  --   --  4   EOS PCT %  --  0 0   < > 1    < > = values in this interval not displayed  Results from last 7 days   Lab Units 07/31/20  0529  07/28/20  0606   SODIUM mmol/L 149*   < > 148*   POTASSIUM mmol/L 3 4*   < > 3 2*   CHLORIDE mmol/L 111*   < > 111*   CO2 mmol/L 31   < > 27   BUN mg/dL 6   < > 13   CREATININE mg/dL 0 60   < > 0 61   ANION GAP mmol/L 7   < > 10   CALCIUM mg/dL 7 8*   < > 7 4*   ALBUMIN g/dL  --   --  1 6*   TOTAL BILIRUBIN mg/dL  --   --  0 50   ALK PHOS U/L  --   --  102   ALT U/L  --   --  37   AST U/L  --   --  159*   GLUCOSE RANDOM mg/dL 95   < > 100    < > = values in this interval not displayed  Results from last 7 days   Lab Units 07/24/20  2118   INR  1 19     Results from last 7 days   Lab Units 07/28/20  1233 07/27/20  0628 07/26/20  1818 07/26/20  1214 07/26/20  0606 07/26/20  0058   POC GLUCOSE mg/dl 118 92 107 107 90 88         Results from last 7 days   Lab Units 07/30/20  0533 07/29/20  0628 07/28/20  0530 07/27/20  0615 07/26/20  0515 07/25/20  0510 07/25/20  0250 07/24/20  2311 07/24/20  2118   LACTIC ACID mmol/L  --   --   --   --   --  1 7 2 2* 2 5* 3 0*   PROCALCITONIN ng/ml 0 36* 0 63* 1 06* 2 33* 4 57*  --   --   --  6 76*           * I Have Reviewed All Lab Data Listed Above  * Additional Pertinent Lab Tests Reviewed: All Labs Within Last 24 Hours Reviewed      Recent Cultures (last 7 days):     Results from last 7 days   Lab Units 07/25/20  0247 07/25/20  0237 07/24/20  0116   BLOOD CULTURE   --   --  No Growth After 5 Days    Micrococcus luteus*   SPUTUM CULTURE  2+ Growth of   --   --    GRAM STAIN RESULT  No Epithelial cells per low power field  Rare Polys  No bacteria seen  --  Gram positive cocci in clusters*   LEGIONELLA URINARY ANTIGEN   --  Positive*  --        Last 24 Hours Medication List:     Current Facility-Administered Medications:  acetaminophen 650 mg Oral Q6H PRN Mary Nicole PA-C   albumin human 12 5 g Intravenous Q8H Asim HOLT MD   atorvastatin 10 mg Oral Daily Mary Nicole PA-C   azithromycin 500 mg Oral Q24H Mary Nicole PA-C   chlorhexidine 15 mL Swish & Spit Q12H [facility] Bubba Maharaj   folic acid 340 mcg Oral Daily Marymyles Nicole PA-C   furosemide 20 mg Intravenous Daily Janet HOLT MD   guaiFENesin 600 mg Oral Q12H [facility] & intermediate Mary Nicole PA-C   heparin (porcine) 5,000 Units Subcutaneous [facility] & intermediate Mary Nicole PA-C   ipratropium 0 5 mg Nebulization TID Mary Nicole PA-C   levalbuterol 1 25 mg Nebulization TID Mary Nicole PA-C   nicotine 21 mg Transdermal Daily Mary Nicole PA-C   thiamine 100 mg Oral Daily Mary Nicole PA-C        Today, Patient Was Seen By: Franchesca Bradshaw MD    ** Please Note: Dictation voice to text software may have been used in the creation of this document   **

## 2020-07-31 NOTE — ASSESSMENT & PLAN NOTE
Patient was admitted under ICU level of care for septic shock secondary to Legionella pneumonia  Now off of pressors and downgraded to med surge  Resolved  Doing well  O2 saturation 97-98% on 1L nasal cannula  Continue azithromycin for total 14 day course per ID recommendation  Continue respiratory protocol and pulmonary hygiene

## 2020-07-31 NOTE — ASSESSMENT & PLAN NOTE
Sodium noted 149  improving  Bilateral lower extremity edema noted  No history of CHF reported    Likely in the settings of hypoalbuminemia  Continue protein supplement  Trial of IV lasix 20 mg + iv albumin   Monitor BMP   lower extremity compression stocking

## 2020-07-31 NOTE — PROGRESS NOTES
Progress Note - Infectious Disease   Yuniel Lopez 48 y o  female MRN: [id number]  Unit/Bed#: -01 Encounter: [id number]      Impression/Recommendations:  1  Septic shock, POA   Source is most likely pneumonia   Patient is clinically much improved   SBP has normalized  Antibiotic plan as in below  Monitor temperature/WBC  Monitor hemodynamics      2  Legionella pneumonia   This is likely etiology of septic shock and acute hypoxic respiratory failure   Patient is clinically much improved on azithromycin   Source of Legionella is most likely patient's old AC units  Patient's  did replace the 2 old AC units at home  Continue azithromycin  Monitor respiratory symptoms  Monitor temperature/WBC  Treat x 14 days total      3  Acute hypoxic respiratory failure, secondary to pneumonia above   Patient was briefly intubated but is now extubated   She is much improved over last 24 hours   O2 support has decreased, currently fluctuating between 1L and 2L  O2 support per primary service  Monitor respiratory symptoms      4   Coagulase negative Staphylococcus bacteremia, with growth in wound 1 out of 2 admission blood cultures   This is most likely contaminant  No antibiotic needed for this  Follow-up on final blood culture results      5  ABELARDO, POA, most likely secondary to septic shock   Creatinine  has normalized  Antibiotic at full dose  Monitor creatinine      6  Encephalopathy, most likely secondary to sepsis and Legionella pneumonia   Encephalopathy is a common manifestation of Legionella pneumonia   Mental status is much improved, admission   No clinical signs of CNS infection  Monitor mental status      7  Nausea and diarrhea, POA, most likely secondary to Legionella pneumonia   GI symptoms are commonly seen in using on pneumonia  Symptoms have resolved    Monitor symptoms      8  Nicotine abuse   Recommend smoking cessation      9  Chronic alcohol use   Recommend stopping alcohol use      Discussed with the patient and    in detail regarding the above plan  Discussed with Dr Guevara Solitario from [facility] service earlier  Okay for discharge from ID viewpoint      Antibiotics:  Azithromycin # 7     Subjective:  Patient is comfortable  Dyspnea mild and improving  Cough improving  Mental status is good  Temperature stays down   No chills  She is tolerating antibiotic well   No nausea, vomiting or diarrhea  Objective:  Vitals:  Temp:  [97 9 °F (36 6 °C)-98 6 °F (37 °C)] 98 1 °F (36 7 °C)  HR:  [76-81] 76  Resp:  [17-18] 17  BP: (128-144)/(79-89) 128/79  SpO2:  [88 %-96 %] 95 %  Temp (24hrs), Av 2 °F (36 8 °C), Min:97 9 °F (36 6 °C), Max:98 6 °F (37 °C)  Current: Temperature: 98 1 °F (36 7 °C)    Physical Exam:     General: Awake, alert, cooperative, no distress  Neck:  Supple  No mass  No lymphadenopathy  Lungs: Expansion symmetric, improved left base rales, no wheezing, respirations unlabored  Heart:  Regular rate and rhythm, S1 and S2 normal, no murmur  Abdomen: Soft, nondistended, non-tender, bowel sounds active all four quadrants, no masses, no organomegaly  Extremities: No edema  No erythema/warmth  No ulcer  Nontender to palpation  Skin:  No rash  Neuro: Moves all extremities  Invasive Devices     Peripheral Intravenous Line            Peripheral IV 20 Distal;Right;Ventral (anterior) Forearm 2 days                Labs studies:   I have personally reviewed pertinent labs    Results from last 7 days   Lab Units 20  0529 20  2220 20  0521  20  0606  20  0510 20  2118   POTASSIUM mmol/L 3 4* 3 1* 3 5   < > 3 2*   < > 2 9* 2 5*   CHLORIDE mmol/L 111* 110* 115*   < > 111*   < > 106 98*   CO2 mmol/L 31 31 28   < > 27   < > 22 27   BUN mg/dL 6 8 11   < > 13   < > 16 15   CREATININE mg/dL 0 60 0 65 0 69   < > 0 61   < > 1 21 1 54*   EGFR ml/min/1 73sq m 107 104 102   < > 106   < > 52 39   CALCIUM mg/dL 7 8* 8 2* 8 2*   < > 7 4*   < > 6 0* 8 1*   AST U/L  --   --   --   --  159*  --  120* 132*   ALT U/L  --   --   --   --  37  --  20 25   ALK PHOS U/L  --   --   --   --  102  --  63 83    < > = values in this interval not displayed  Results from last 7 days   Lab Units 07/31/20  0529 07/30/20  0521 07/29/20  0628   WBC Thousand/uL 12 52* 12 40* 10 37*   HEMOGLOBIN g/dL 8 7* 9 1* 8 7*   PLATELETS Thousands/uL 310 319 270     Results from last 7 days   Lab Units 07/25/20  0247 07/25/20  0238 07/25/20  0237 07/24/20 2118   BLOOD CULTURE   --   --   --  No Growth After 5 Days  Micrococcus luteus*   SPUTUM CULTURE  2+ Growth of   --   --   --    GRAM STAIN RESULT  No Epithelial cells per low power field  Rare Polys  No bacteria seen  --   --  Gram positive cocci in clusters*   MRSA CULTURE ONLY   --  No Methicillin Resistant Staphlyococcus aureus (MRSA) isolated  --   --    LEGIONELLA URINARY ANTIGEN   --   --  Positive*  --        Imaging Studies:   I have personally reviewed pertinent imaging study reports and images in PACS  EKG, Pathology, and Other Studies:   I have personally reviewed pertinent reports

## 2020-07-31 NOTE — SPEECH THERAPY NOTE
Speech Language/Pathology    Speech/Language Pathology Progress Note    Patient Name: Blanche Hooks  Today's Date: 7/31/2020     Subjective:  Patient on puree diet with honey thick liquids- per RN this was accidentally placed this morning  RN reports patient has been tolerating regular diet with cough however this is also noted at baseline  Patient reports her vocal quality is now back to baseline  Objective:  Patient consumed thin liquids and cracker  Mastication was adequate with the materials administered today  Bolus formation and transfer were functional with no significant oral residue noted  No overt s/s reduced oral control  Swallowing initiation appeared prompt  Laryngeal rise was palpated and judged to be within functional limits  No coughing, throat clearing, change in vocal quality or respiratory status noted today       Assessment:  Patients oropharyngeal swallow function appears to be GWFL at this time       Plan/Recommendations:  Continue regular diet with thin liquids  meds as tolerated     Aspiration precautions and compensatory swallowing strategies: upright posture, only feed when fully alert, slow rate of feeding and small bites/sips    No further ST indicated at this time     HUONG Huber , CCC-SLP  Speech Language Pathologist   Available via [address] [place] [address] #[id number]  [place] #[id number]

## 2020-07-31 NOTE — PLAN OF CARE
Problem: Prexisting or High Potential for Compromised Skin Integrity  Goal: Skin integrity is maintained or improved  Description  INTERVENTIONS:  - Identify patients at risk for skin breakdown  - Assess and monitor skin integrity  - Assess and monitor nutrition and hydration status  - Monitor labs   - Assess for incontinence   - Turn and reposition patient  - Assist with mobility/ambulation  - Relieve pressure over bony prominences  - Avoid friction and shearing  - Provide appropriate hygiene as needed including keeping skin clean and dry  - Evaluate need for skin moisturizer/barrier cream  - Collaborate with interdisciplinary team   - Patient/family teaching  - Consider wound care consult   Outcome: Progressing     Problem: Potential for Falls  Goal: Patient will remain free of falls  Description  INTERVENTIONS:  - Assess patient frequently for physical needs  -  Identify cognitive and physical deficits and behaviors that affect risk of falls  -  [place] fall precautions as indicated by assessment   - Educate patient/family on patient safety including physical limitations  - Instruct patient to call for assistance with activity based on assessment  - Modify environment to reduce risk of injury  - Consider OT/PT consult to assist with strengthening/mobility  Outcome: Progressing     Problem: Nutrition/Hydration-ADULT  Goal: Nutrient/Hydration intake appropriate for improving, restoring or maintaining nutritional needs  Description  Monitor and assess patient's nutrition/hydration status for malnutrition  Collaborate with interdisciplinary team and initiate plan and interventions as ordered  Monitor patient's weight and dietary intake as ordered or per policy  Utilize nutrition screening tool and intervene as necessary  Determine patient's food preferences and provide high-protein, high-caloric foods as appropriate       INTERVENTIONS:  - Monitor oral intake, urinary output, labs, and treatment plans  - Assess nutrition and hydration status and recommend course of action  - Evaluate amount of meals eaten  - Assist patient with eating if necessary   - Allow adequate time for meals  - Recommend/ encourage appropriate diets, oral nutritional supplements, and vitamin/mineral supplements  - Order, calculate, and assess calorie counts as needed  - Recommend, monitor, and adjust tube feedings based on assessed needs  - Assess need for intravenous fluids  - Provide nutrition/hydration education as appropriate  - Include patient/family/caregiver in decisions related to nutrition   Outcome: Progressing     Problem: PAIN - ADULT  Goal: Verbalizes/displays adequate comfort level or baseline comfort level  Description  Interventions:  - Encourage patient to monitor pain and request assistance  - Assess pain using appropriate pain scale  - Administer analgesics based on type and severity of pain and evaluate response  - Implement non-pharmacological measures as appropriate and evaluate response  - Consider cultural and social influences on pain and pain management  - Notify physician/advanced practitioner if interventions unsuccessful or patient reports new pain  Outcome: Progressing     Problem: INFECTION - ADULT  Goal: Absence or prevention of progression during hospitalization  Description  INTERVENTIONS:  - Assess and monitor for signs and symptoms of infection  - Monitor lab/diagnostic results  - Monitor all insertion sites, i e  indwelling lines, tubes, and drains  - Monitor endotracheal if appropriate and nasal secretions for changes in amount and color  - [place] appropriate cooling/warming therapies per order  - Administer medications as ordered  - Instruct and encourage patient and family to use good hand hygiene technique  - Identify and instruct in appropriate isolation precautions for identified infection/condition  Outcome: Progressing  Goal: Absence of fever/infection during neutropenic period  Description  INTERVENTIONS:  - Monitor WBC    Outcome: Progressing     Problem: SAFETY ADULT  Goal: Maintain or return to baseline ADL function  Description  INTERVENTIONS:  -  Assess patient's ability to carry out ADLs; assess patient's baseline for ADL function and identify physical deficits which impact ability to perform ADLs (bathing, care of mouth/teeth, toileting, grooming, dressing, etc )  - Assess/evaluate cause of self-care deficits   - Assess range of motion  - Assess patient's mobility; develop plan if impaired  - Assess patient's need for assistive devices and provide as appropriate  - Encourage maximum independence but intervene and supervise when necessary  - Involve family in performance of ADLs  - Assess for home care needs following discharge   - Consider OT consult to assist with ADL evaluation and planning for discharge  - Provide patient education as appropriate  Outcome: Progressing  Goal: Maintain or return mobility status to optimal level  Description  INTERVENTIONS:  - Assess patient's baseline mobility status (ambulation, transfers, stairs, etc )    - Identify cognitive and physical deficits and behaviors that affect mobility  - Identify mobility aids required to assist with transfers and/or ambulation (gait belt, sit-to-stand, lift, walker, cane, etc )  - [place] fall precautions as indicated by assessment  - Record patient progress and toleration of activity level on Mobility SBAR; progress patient to next Phase/Stage  - Instruct patient to call for assistance with activity based on assessment  - Consider rehabilitation consult to assist with strengthening/weightbearing, etc   Outcome: Progressing     Problem: DISCHARGE PLANNING  Goal: Discharge to home or other facility with appropriate resources  Description  INTERVENTIONS:  - Identify barriers to discharge w/patient and caregiver  - Arrange for needed discharge resources and transportation as appropriate  - Identify discharge learning needs (meds, wound care, etc )  - Arrange for interpretive services to assist at discharge as needed  - Refer to Case Management Department for coordinating discharge planning if the patient needs post-hospital services based on physician/advanced practitioner order or complex needs related to functional status, cognitive ability, or social support system  Outcome: Progressing     Problem: Knowledge Deficit  Goal: Patient/family/caregiver demonstrates understanding of disease process, treatment plan, medications, and discharge instructions  Description  Complete learning assessment and assess knowledge base    Interventions:  - Provide teaching at level of understanding  - Provide teaching via preferred learning methods  Outcome: Progressing     Problem: NEUROSENSORY - ADULT  Goal: Achieves stable or improved neurological status  Description  INTERVENTIONS  - Monitor and report changes in neurological status  - Monitor vital signs such as temperature, blood pressure, glucose, and any other labs ordered   - Initiate measures to prevent increased intracranial pressure  - Monitor for seizure activity and implement precautions if appropriate      Outcome: Progressing     Problem: RESPIRATORY - ADULT  Goal: Achieves optimal ventilation and oxygenation  Description  INTERVENTIONS:  - Assess for changes in respiratory status  - Assess for changes in mentation and behavior  - Position to facilitate oxygenation and minimize respiratory effort  - Oxygen administered by appropriate delivery if ordered  - Initiate smoking cessation education as indicated  - Encourage broncho-pulmonary hygiene including cough, deep breathe, Incentive Spirometry  - Assess the need for suctioning and aspirate as needed  - Assess and instruct to report SOB or any respiratory difficulty  - Respiratory Therapy support as indicated  Outcome: Progressing     Problem: GENITOURINARY - ADULT  Goal: Maintains or returns to baseline urinary function  Description  INTERVENTIONS:  - Assess urinary function  - Encourage oral fluids to ensure adequate hydration if ordered  - Administer IV fluids as ordered to ensure adequate hydration  - Administer ordered medications as needed  - Offer frequent toileting  - Follow urinary retention protocol if ordered  Outcome: Progressing  Goal: Urinary catheter remains patent  Description  INTERVENTIONS:  - Assess patency of urinary catheter  - If patient has a chronic restrepo, consider changing catheter if non-functioning  - Follow guidelines for intermittent irrigation of non-functioning urinary catheter  Outcome: Progressing

## 2020-08-01 LAB
ALBUMIN SERPL BCP-MCNC: 2.5 G/DL (ref 3.5–5)
ALP SERPL-CCNC: 128 U/L (ref 46–116)
ALT SERPL W P-5'-P-CCNC: 42 U/L (ref 12–78)
ANION GAP SERPL CALCULATED.3IONS-SCNC: 6 MMOL/L (ref 4–13)
AST SERPL W P-5'-P-CCNC: 60 U/L (ref 5–45)
BILIRUB SERPL-MCNC: 0.8 MG/DL (ref 0.2–1)
BUN SERPL-MCNC: 6 MG/DL (ref 5–25)
CALCIUM SERPL-MCNC: 7.9 MG/DL (ref 8.3–10.1)
CHLORIDE SERPL-SCNC: 106 MMOL/L (ref 100–108)
CO2 SERPL-SCNC: 32 MMOL/L (ref 21–32)
CREAT SERPL-MCNC: 0.62 MG/DL (ref 0.6–1.3)
ERYTHROCYTE [DISTWIDTH] IN BLOOD BY AUTOMATED COUNT: 17.2 % (ref 11.6–15.1)
GFR SERPL CREATININE-BSD FRML MDRD: 105 ML/MIN/1.73SQ M
GLUCOSE SERPL-MCNC: 95 MG/DL (ref 65–140)
HCT VFR BLD AUTO: 27.5 % (ref 34.8–46.1)
HGB BLD-MCNC: 8.7 G/DL (ref 11.5–15.4)
MCH RBC QN AUTO: 29.6 PG (ref 26.8–34.3)
MCHC RBC AUTO-ENTMCNC: 31.6 G/DL (ref 31.4–37.4)
MCV RBC AUTO: 94 FL (ref 82–98)
NRBC BLD AUTO-RTO: 1 /100 WBCS
PLATELET # BLD AUTO: 286 THOUSANDS/UL (ref 149–390)
PMV BLD AUTO: 11.8 FL (ref 8.9–12.7)
POTASSIUM SERPL-SCNC: 3.3 MMOL/L (ref 3.5–5.3)
PROT SERPL-MCNC: 5.3 G/DL (ref 6.4–8.2)
RBC # BLD AUTO: 2.94 MILLION/UL (ref 3.81–5.12)
SODIUM SERPL-SCNC: 144 MMOL/L (ref 136–145)
WBC # BLD AUTO: 11.71 THOUSAND/UL (ref 4.31–10.16)

## 2020-08-01 PROCEDURE — 85027 COMPLETE CBC AUTOMATED: CPT | Performed by: STUDENT IN AN ORGANIZED HEALTH CARE EDUCATION/TRAINING PROGRAM

## 2020-08-01 PROCEDURE — 99232 SBSQ HOSP IP/OBS MODERATE 35: CPT | Performed by: STUDENT IN AN ORGANIZED HEALTH CARE EDUCATION/TRAINING PROGRAM

## 2020-08-01 PROCEDURE — 80053 COMPREHEN METABOLIC PANEL: CPT | Performed by: STUDENT IN AN ORGANIZED HEALTH CARE EDUCATION/TRAINING PROGRAM

## 2020-08-01 PROCEDURE — 94760 N-INVAS EAR/PLS OXIMETRY 1: CPT

## 2020-08-01 PROCEDURE — 94640 AIRWAY INHALATION TREATMENT: CPT

## 2020-08-01 PROCEDURE — 94668 MNPJ CHEST WALL SBSQ: CPT

## 2020-08-01 RX ADMIN — LEVALBUTEROL HYDROCHLORIDE 1.25 MG: 1.25 SOLUTION, CONCENTRATE RESPIRATORY (INHALATION) at 08:01

## 2020-08-01 RX ADMIN — GUAIFENESIN 200 MG: 100 SOLUTION ORAL at 02:45

## 2020-08-01 RX ADMIN — CHLORHEXIDINE GLUCONATE 0.12% ORAL RINSE 15 ML: 1.2 LIQUID ORAL at 09:49

## 2020-08-01 RX ADMIN — HEPARIN SODIUM 5000 UNITS: 5000 INJECTION INTRAVENOUS; SUBCUTANEOUS at 05:54

## 2020-08-01 RX ADMIN — LEVALBUTEROL HYDROCHLORIDE 1.25 MG: 1.25 SOLUTION, CONCENTRATE RESPIRATORY (INHALATION) at 14:00

## 2020-08-01 RX ADMIN — CHLORHEXIDINE GLUCONATE 0.12% ORAL RINSE 15 ML: 1.2 LIQUID ORAL at 22:02

## 2020-08-01 RX ADMIN — HEPARIN SODIUM 5000 UNITS: 5000 INJECTION INTRAVENOUS; SUBCUTANEOUS at 17:19

## 2020-08-01 RX ADMIN — IPRATROPIUM BROMIDE 0.5 MG: 0.5 SOLUTION RESPIRATORY (INHALATION) at 08:01

## 2020-08-01 RX ADMIN — THIAMINE HCL TAB 100 MG 100 MG: 100 TAB at 09:50

## 2020-08-01 RX ADMIN — ALBUMIN (HUMAN) 12.5 G: 0.25 INJECTION, SOLUTION INTRAVENOUS at 02:45

## 2020-08-01 RX ADMIN — AZITHROMYCIN 500 MG: 500 TABLET, FILM COATED ORAL at 09:50

## 2020-08-01 RX ADMIN — ALBUMIN (HUMAN) 12.5 G: 0.25 INJECTION, SOLUTION INTRAVENOUS at 17:22

## 2020-08-01 RX ADMIN — HEPARIN SODIUM 5000 UNITS: 5000 INJECTION INTRAVENOUS; SUBCUTANEOUS at 22:02

## 2020-08-01 RX ADMIN — FUROSEMIDE 20 MG: 10 INJECTION, SOLUTION INTRAMUSCULAR; INTRAVENOUS at 09:49

## 2020-08-01 RX ADMIN — ATORVASTATIN CALCIUM 10 MG: 10 TABLET, FILM COATED ORAL at 17:19

## 2020-08-01 RX ADMIN — Medication 400 MCG: at 09:50

## 2020-08-01 RX ADMIN — IPRATROPIUM BROMIDE 0.5 MG: 0.5 SOLUTION RESPIRATORY (INHALATION) at 14:00

## 2020-08-01 RX ADMIN — LEVALBUTEROL HYDROCHLORIDE 1.25 MG: 1.25 SOLUTION, CONCENTRATE RESPIRATORY (INHALATION) at 19:39

## 2020-08-01 RX ADMIN — POTASSIUM CHLORIDE 40 MEQ: 1500 TABLET, EXTENDED RELEASE ORAL at 09:50

## 2020-08-01 RX ADMIN — ALBUMIN (HUMAN) 12.5 G: 0.25 INJECTION, SOLUTION INTRAVENOUS at 09:54

## 2020-08-01 RX ADMIN — IPRATROPIUM BROMIDE 0.5 MG: 0.5 SOLUTION RESPIRATORY (INHALATION) at 19:39

## 2020-08-01 RX ADMIN — POTASSIUM CHLORIDE 40 MEQ: 1500 TABLET, EXTENDED RELEASE ORAL at 17:19

## 2020-08-01 NOTE — ASSESSMENT & PLAN NOTE
Patient was admitted under ICU level of care for septic shock secondary to Legionella pneumonia  Now off of pressors and downgraded to med surge  Resolved  Doing well  O2 saturation 97-98% on 1L nasal cannula  Azithromycin Day # 8  Continue azithromycin for total 14 day course per ID recommendation  Continue respiratory protocol and pulmonary hygiene

## 2020-08-01 NOTE — PROGRESS NOTES
Progress Note - Colonel Villatoroer 1970, 48 y o  female MRN: [id number]    Unit/Bed#: -01 Encounter: [id number]    Primary Care Provider: Liliane Flor MD   Date and time admitted to hospital: 7/24/2020  9:08 PM        * Septic shock [facility])  Assessment & Plan  Patient was admitted under ICU level of care for septic shock secondary to Legionella pneumonia  Now off of pressors and downgraded to med surge  Resolved  Doing well  O2 saturation 97-98% on 1L nasal cannula  Azithromycin Day # 8  Continue azithromycin for total 14 day course per ID recommendation  Continue respiratory protocol and pulmonary hygiene  Encephalopathy  Assessment & Plan  Likely secondary to septic shock  Resolved        Hypernatremia  Assessment & Plan  Sodium noted 144  Noted with b/l lower extremity edema  Echo Ef 60% with grade 1 diastolic dysfunction  Hyponatremia improving  Bilateral lower extremity edema improving  Likely in the settings of hypoalbuminemia  Continue protein supplement  Trial of IV lasix 20 mg   Monitor BMP   lower extremity compression stocking    Legionella pneumonia ([address] )  Assessment & Plan  Improving  Continue azithromycin course for refer neck per ID recommendation    Acute respiratory failure with hypoxia ([address] )  Assessment & Plan  Significantly better  Currently O2 saturation 97% on 1 L nasal cannula  Wean off of oxygen supplement as able        Tobacco use  Assessment & Plan  Counseled for smoking cessation  Hyperlipemia  Assessment & Plan  Continue statin        VTE Pharmacologic Prophylaxis:   Pharmacologic: Heparin    Patient Centered Rounds: I have performed bedside rounds with nursing staff today  Education and Discussions with Family / Patient:   at bedside had discussion at length  Time Spent for Care: 30 minutes  More than 50% of total time spent on counseling and coordination of care as described above      Current Length of Stay: 7 day(s)    Current Patient Status: Inpatient   Certification Statement: The patient will continue to require additional inpatient hospital stay due to Above    Discharge Plan:  Expected 48 hours  Patient is agreeable to go to rehab when ready  Code Status: Level 1 - Full Code      Subjective:   Afebrile, hemodynamically stable  O2 saturation 96% on 1 L nasal cannula  Patient is in good spirits  Reports feeling overall much better  Still complaining of cough  Denies chest pain, dyspnea, fever, chills, nausea, vomiting, abdominal pain, diarrhea, any other new complaints  Tolerating antibiotics well  No other events reported  Objective:     Vitals:   Temp (24hrs), Av 5 °F (36 9 °C), Min:98 2 °F (36 8 °C), Max:98 7 °F (37 1 °C)    Temp:  [98 2 °F (36 8 °C)-98 7 °F (37 1 °C)] 98 2 °F (36 8 °C)  HR:  [91-95] 91  Resp:  [18] 18  BP: (116-131)/(76-87) 116/76  SpO2:  [92 %-99 %] 95 %  Body mass index is 26 12 kg/m²  Input and Output Summary (last 24 hours): Intake/Output Summary (Last 24 hours) at 2020 1532  Last data filed at 2020 1400  Gross per 24 hour   Intake 1270 ml   Output 1225 ml   Net 45 ml       Physical Exam:     Physical Exam   Constitutional: She is oriented to person, place, and time  No distress  HENT:   Head: Normocephalic and atraumatic  Eyes: Pupils are equal, round, and reactive to light  EOM are normal    Neck: Normal range of motion  Neck supple  No JVD present  Cardiovascular: Normal rate and regular rhythm  Pulmonary/Chest: Effort normal  No stridor  No respiratory distress  Rhonchorous lung sounds noted  Abdominal: Soft  Bowel sounds are normal  She exhibits no distension  There is no tenderness  Musculoskeletal: Normal range of motion  She exhibits edema (Bilateral lower extremity 3+ pitting edema noted  )  Neurological: She is alert and oriented to person, place, and time  Skin: She is not diaphoretic     Psychiatric: Her behavior is normal        Additional Data: Labs:    Results from last 7 days   Lab Units 08/01/20  0455  07/30/20  0521 07/29/20  0628   WBC Thousand/uL 11 71*   < > 12 40* 10 37*   HEMOGLOBIN g/dL 8 7*   < > 9 1* 8 7*   HEMATOCRIT % 27 5*   < > 28 5* 26 9*   PLATELETS Thousands/uL 286   < > 319 270   NEUTROS PCT %  --   --   --  70   LYMPHS PCT %  --   --   --  16   LYMPHO PCT %  --   --  21  --    MONOS PCT %  --   --   --  12   MONO PCT %  --   --  11  --    EOS PCT %  --   --  0 0    < > = values in this interval not displayed  Results from last 7 days   Lab Units 08/01/20  0455   SODIUM mmol/L 144   POTASSIUM mmol/L 3 3*   CHLORIDE mmol/L 106   CO2 mmol/L 32   BUN mg/dL 6   CREATININE mg/dL 0 62   ANION GAP mmol/L 6   CALCIUM mg/dL 7 9*   ALBUMIN g/dL 2 5*   TOTAL BILIRUBIN mg/dL 0 80   ALK PHOS U/L 128*   ALT U/L 42   AST U/L 60*   GLUCOSE RANDOM mg/dL 95         Results from last 7 days   Lab Units 07/28/20  1233 07/27/20  0628 07/26/20  1818 07/26/20  1214 07/26/20  0606 07/26/20  0058   POC GLUCOSE mg/dl 118 92 107 107 90 88         Results from last 7 days   Lab Units 07/30/20  0533 07/29/20  0628 07/28/20  0530 07/27/20  0615 07/26/20  0515   PROCALCITONIN ng/ml 0 36* 0 63* 1 06* 2 33* 4 57*           * I Have Reviewed All Lab Data Listed Above  * Additional Pertinent Lab Tests Reviewed:  All Labs Within Last 24 Hours Reviewed      Recent Cultures (last 7 days):           Last 24 Hours Medication List:     Current Facility-Administered Medications:  acetaminophen 650 mg Oral Q6H PRN Facundo Jauregui PA-C   albumin human 12 5 g Intravenous Q8H Asim HOLT MD   atorvastatin 10 mg Oral Daily Facundo Jauregui PA-C   azithromycin 500 mg Oral Q24H Facundo Jauregui PA-C   chlorhexidine 15 mL Swish & Spit Q12H [facility] Facundo Jauregui, [place]   folic acid 523 mcg Oral Daily Facundo Jauregui PA-C   furosemide 20 mg Intravenous Daily Asim HOLT MD   guaiFENesin 200 mg Oral Q4H PRN JOEL Paige   heparin (porcine) 5,000 Units Subcutaneous Q8H [address] Shelton Paz PA-C   ipratropium 0 5 mg Nebulization TID Shelton Paz PA-C   levalbuterol 1 25 mg Nebulization TID Shelton Paz PA-C   nicotine 21 mg Transdermal Daily Shelton Paz PA-C   potassium chloride 40 mEq Oral BID Jaycob Perez MD   thiamine 100 mg Oral Daily Shelton Paz PA-C        Today, Patient Was Seen By: Bella Mclaughlin MD    ** Please Note: Dictation voice to text software may have been used in the creation of this document   **

## 2020-08-01 NOTE — ASSESSMENT & PLAN NOTE
Sodium noted 144  Noted with b/l lower extremity edema  Echo Ef 60% with grade 1 diastolic dysfunction  Hyponatremia improving  Bilateral lower extremity edema improving    Likely in the settings of hypoalbuminemia  Continue protein supplement  Trial of IV lasix 20 mg   Monitor BMP   lower extremity compression stocking

## 2020-08-02 PROCEDURE — 99232 SBSQ HOSP IP/OBS MODERATE 35: CPT | Performed by: STUDENT IN AN ORGANIZED HEALTH CARE EDUCATION/TRAINING PROGRAM

## 2020-08-02 PROCEDURE — 94668 MNPJ CHEST WALL SBSQ: CPT

## 2020-08-02 PROCEDURE — 94640 AIRWAY INHALATION TREATMENT: CPT

## 2020-08-02 PROCEDURE — 94760 N-INVAS EAR/PLS OXIMETRY 1: CPT

## 2020-08-02 RX ORDER — POTASSIUM CHLORIDE 20 MEQ/1
40 TABLET, EXTENDED RELEASE ORAL
Status: DISCONTINUED | OUTPATIENT
Start: 2020-08-02 | End: 2020-08-03

## 2020-08-02 RX ORDER — MAGNESIUM SULFATE 1 G/100ML
1 INJECTION INTRAVENOUS ONCE
Status: COMPLETED | OUTPATIENT
Start: 2020-08-02 | End: 2020-08-02

## 2020-08-02 RX ORDER — FUROSEMIDE 40 MG/1
40 TABLET ORAL DAILY
Status: DISCONTINUED | OUTPATIENT
Start: 2020-08-02 | End: 2020-08-04 | Stop reason: HOSPADM

## 2020-08-02 RX ADMIN — GUAIFENESIN 200 MG: 100 SOLUTION ORAL at 02:20

## 2020-08-02 RX ADMIN — POTASSIUM CHLORIDE 40 MEQ: 1500 TABLET, EXTENDED RELEASE ORAL at 09:05

## 2020-08-02 RX ADMIN — ATORVASTATIN CALCIUM 10 MG: 10 TABLET, FILM COATED ORAL at 17:02

## 2020-08-02 RX ADMIN — LEVALBUTEROL HYDROCHLORIDE 1.25 MG: 1.25 SOLUTION, CONCENTRATE RESPIRATORY (INHALATION) at 13:51

## 2020-08-02 RX ADMIN — FUROSEMIDE 40 MG: 40 TABLET ORAL at 09:05

## 2020-08-02 RX ADMIN — LEVALBUTEROL HYDROCHLORIDE 1.25 MG: 1.25 SOLUTION, CONCENTRATE RESPIRATORY (INHALATION) at 19:19

## 2020-08-02 RX ADMIN — Medication 400 MCG: at 09:05

## 2020-08-02 RX ADMIN — CHLORHEXIDINE GLUCONATE 0.12% ORAL RINSE 15 ML: 1.2 LIQUID ORAL at 22:07

## 2020-08-02 RX ADMIN — ALBUMIN (HUMAN) 12.5 G: 0.25 INJECTION, SOLUTION INTRAVENOUS at 02:20

## 2020-08-02 RX ADMIN — POTASSIUM CHLORIDE 40 MEQ: 1500 TABLET, EXTENDED RELEASE ORAL at 12:23

## 2020-08-02 RX ADMIN — HEPARIN SODIUM 5000 UNITS: 5000 INJECTION INTRAVENOUS; SUBCUTANEOUS at 05:46

## 2020-08-02 RX ADMIN — CHLORHEXIDINE GLUCONATE 0.12% ORAL RINSE 15 ML: 1.2 LIQUID ORAL at 09:05

## 2020-08-02 RX ADMIN — LEVALBUTEROL HYDROCHLORIDE 1.25 MG: 1.25 SOLUTION, CONCENTRATE RESPIRATORY (INHALATION) at 07:53

## 2020-08-02 RX ADMIN — IPRATROPIUM BROMIDE 0.5 MG: 0.5 SOLUTION RESPIRATORY (INHALATION) at 07:53

## 2020-08-02 RX ADMIN — POTASSIUM CHLORIDE 40 MEQ: 1500 TABLET, EXTENDED RELEASE ORAL at 17:02

## 2020-08-02 RX ADMIN — HEPARIN SODIUM 5000 UNITS: 5000 INJECTION INTRAVENOUS; SUBCUTANEOUS at 17:01

## 2020-08-02 RX ADMIN — IPRATROPIUM BROMIDE 0.5 MG: 0.5 SOLUTION RESPIRATORY (INHALATION) at 19:19

## 2020-08-02 RX ADMIN — HEPARIN SODIUM 5000 UNITS: 5000 INJECTION INTRAVENOUS; SUBCUTANEOUS at 22:07

## 2020-08-02 RX ADMIN — THIAMINE HCL TAB 100 MG 100 MG: 100 TAB at 09:05

## 2020-08-02 RX ADMIN — AZITHROMYCIN 500 MG: 500 TABLET, FILM COATED ORAL at 09:05

## 2020-08-02 RX ADMIN — MAGNESIUM SULFATE HEPTAHYDRATE 1 G: 1 INJECTION, SOLUTION INTRAVENOUS at 12:21

## 2020-08-02 RX ADMIN — GUAIFENESIN 200 MG: 100 SOLUTION ORAL at 22:07

## 2020-08-02 RX ADMIN — IPRATROPIUM BROMIDE 0.5 MG: 0.5 SOLUTION RESPIRATORY (INHALATION) at 13:51

## 2020-08-02 NOTE — ASSESSMENT & PLAN NOTE
Patient was admitted under ICU level of care for septic shock secondary to Legionella pneumonia  Now off of pressors and downgraded to med surge  Resolved  Doing well  O2 saturation 97-98% on 1L nasal cannula  Azithromycin Day # 9  Continue azithromycin for total 14 day course per ID recommendation  Continue to encourage incentive spirometry use, ambulation as able  Wean off of oxygen as able  Continue respiratory protocol and pulmonary hygiene  Recommended repeat CT chest outpatient in 6-8 weeks after completing antibiotic course to monitor resolution

## 2020-08-02 NOTE — SOCIAL WORK
Pt feels she no longer needs STR  Would like home  More [facility] referrals sent as [facility] could not take her  Cm spoke to MD and pt not cleared yet, still weaning oxygen  Will have PT reassess her in the morning   Cm to follow

## 2020-08-02 NOTE — PROGRESS NOTES
Progress Note - Monica Ramsey 1970, 48 y o  female MRN: [id number]    Unit/Bed#: -01 Encounter: [id number]    Primary Care Provider: Damaris Winkler MD   Date and time admitted to hospital: 7/24/2020  9:08 PM        * Septic shock [facility])  Assessment & Plan  Patient was admitted under ICU level of care for septic shock secondary to Legionella pneumonia  Now off of pressors and downgraded to med surge  Resolved  Doing well  O2 saturation 97-98% on 1L nasal cannula  Azithromycin Day # 9  Continue azithromycin for total 14 day course per ID recommendation  Continue to encourage incentive spirometry use, ambulation as able  Wean off of oxygen as able  Continue respiratory protocol and pulmonary hygiene  Recommended repeat CT chest outpatient in 6-8 weeks after completing antibiotic course to monitor resolution  Encephalopathy  Assessment & Plan  Likely secondary to septic shock  Resolved        Hypernatremia  Assessment & Plan  Sodium noted 144  Noted with b/l lower extremity edema  Echo Ef 60% with grade 1 diastolic dysfunction  Hyponatremia improving with IV albumin and lasix and lower extremity compression stocking  Bilateral lower extremity edema improving  Likely in the settings of hypoalbuminemia   Continue protein supplement  Transition to oral Lasix 40 mg daily  Encouraged to keep lower extremity elevated at all times  Continue with knee-high compression stocking  Protein supplement  Monitor BMP  Legionella pneumonia [facility])  Assessment & Plan  Improving  Continue azithromycin course for refer neck per ID recommendation    Acute respiratory failure with hypoxia ([address] )  Assessment & Plan  Significantly better  Currently O2 saturation 97% on 1 L nasal cannula  Wean off of oxygen supplement as able        Tobacco use  Assessment & Plan  Counseled for smoking cessation  Hyperlipemia  Assessment & Plan  Continue statin        Hypokalemia  K 3 3    Continue PO 40mew TID with meals  Monitor BMP    VTE Pharmacologic Prophylaxis:   Pharmacologic: Heparin    Patient Centered Rounds: I have performed bedside rounds with nursing staff today  Education and Discussions with Family / Patient:   at bedside  Time Spent for Care: 30 minutes  More than 50% of total time spent on counseling and coordination of care as described above  Current Length of Stay: 8 day(s)    Current Patient Status: Inpatient   Certification Statement: The patient will continue to require additional inpatient hospital stay due to Above    Discharge Plan:  Case management working on placement  Code Status: Level 1 - Full Code      Subjective:   Afebrile, hemodynamically stable  O2 saturation 97% on 0 5-1 L nasal cannula  Patient reports that she has been walking in the hallway  Also reports that she has been exercising lungs as recommended  Denies chest pain, dyspnea, fever, chills, nausea vomiting, diarrhea  Reports overall feeling much better  Agreeable to go to rehab  No other events reported  Objective:     Vitals:   Temp (24hrs), Av 4 °F (36 9 °C), Min:98 °F (36 7 °C), Max:99 °F (37 2 °C)    Temp:  [98 °F (36 7 °C)-99 °F (37 2 °C)] 98 3 °F (36 8 °C)  HR:  [80-95] 80  Resp:  [18-22] 22  BP: (116-124)/(74-80) 121/80  SpO2:  [94 %-99 %] 97 %  Body mass index is 25 83 kg/m²  Input and Output Summary (last 24 hours): Intake/Output Summary (Last 24 hours) at 2020 1113  Last data filed at 2020 0301  Gross per 24 hour   Intake 530 ml   Output 700 ml   Net -170 ml       Physical Exam:     Physical Exam   Constitutional: She is oriented to person, place, and time  No distress  HENT:   Head: Normocephalic and atraumatic  Eyes: Pupils are equal, round, and reactive to light  EOM are normal    Neck: Normal range of motion  Neck supple  No JVD present  Cardiovascular: Normal rate and regular rhythm  Pulmonary/Chest: Effort normal  No stridor  No respiratory distress  Rhonchorous lung sounds noted  Abdominal: Soft  Bowel sounds are normal  She exhibits no distension  There is no abdominal tenderness  Musculoskeletal: Normal range of motion  General: Edema (Bilateral lower extremity 3+ pitting edema noted ) present  Neurological: She is alert and oriented to person, place, and time  Skin: She is not diaphoretic  Psychiatric: Her behavior is normal        Additional Data:     Labs:    Results from last 7 days   Lab Units 08/01/20  0455  07/30/20  0521 07/29/20  0628   WBC Thousand/uL 11 71*   < > 12 40* 10 37*   HEMOGLOBIN g/dL 8 7*   < > 9 1* 8 7*   HEMATOCRIT % 27 5*   < > 28 5* 26 9*   PLATELETS Thousands/uL 286   < > 319 270   NEUTROS PCT %  --   --   --  70   LYMPHS PCT %  --   --   --  16   LYMPHO PCT %  --   --  21  --    MONOS PCT %  --   --   --  12   MONO PCT %  --   --  11  --    EOS PCT %  --   --  0 0    < > = values in this interval not displayed  Results from last 7 days   Lab Units 08/01/20  0455   SODIUM mmol/L 144   POTASSIUM mmol/L 3 3*   CHLORIDE mmol/L 106   CO2 mmol/L 32   BUN mg/dL 6   CREATININE mg/dL 0 62   ANION GAP mmol/L 6   CALCIUM mg/dL 7 9*   ALBUMIN g/dL 2 5*   TOTAL BILIRUBIN mg/dL 0 80   ALK PHOS U/L 128*   ALT U/L 42   AST U/L 60*   GLUCOSE RANDOM mg/dL 95         Results from last 7 days   Lab Units 07/28/20  1233 07/27/20  0628 07/26/20  1818 07/26/20  1214   POC GLUCOSE mg/dl 118 92 107 107         Results from last 7 days   Lab Units 07/30/20  0533 07/29/20  0628 07/28/20  0530 07/27/20  0615   PROCALCITONIN ng/ml 0 36* 0 63* 1 06* 2 33*           * I Have Reviewed All Lab Data Listed Above  * Additional Pertinent Lab Tests Reviewed:  All Labs Within Last 24 Hours Reviewed      Recent Cultures (last 7 days):           Last 24 Hours Medication List:   acetaminophen, 650 mg, Oral, Q6H PRN, Daphene Sox, PA-C  atorvastatin, 10 mg, Oral, Daily, Daphene Sox, PA-C  azithromycin, 500 mg, Oral, Q24H, Lin Dutta [place]  chlorhexidine, 15 mL, Swish & [place]unice, Q12H [address], Shelton Paz PA-C  folic acid, 024 mcg, Oral, Daily, Shelton Paz PA-C  furosemide, 40 mg, Oral, Daily, Asim HOLT MD  guaiFENesin, 200 mg, Oral, Q4H PRN, JOEL Buenrostro  heparin (porcine), 5,000 Units, Subcutaneous, Q8H [address], Shelton Paz PA-C  ipratropium, 0 5 mg, Nebulization, TID, Shelton Paz PA-C  levalbuterol, 1 25 mg, Nebulization, TID, Shelton Paz PA-C  nicotine, 21 mg, Transdermal, Daily, Shelton Paz PA-C  potassium chloride, 40 mEq, Oral, TID With Meals, Jaycob Perez MD  thiamine, 100 mg, Oral, Daily, Shelton Paz PA-C         Today, Patient Was Seen By: Bella Mclaughlin MD    ** Please Note: Dictation voice to text software may have been used in the creation of this document   **

## 2020-08-02 NOTE — ASSESSMENT & PLAN NOTE
Sodium noted 144  Noted with b/l lower extremity edema  Echo Ef 60% with grade 1 diastolic dysfunction  Hyponatremia improving with IV albumin and lasix and lower extremity compression stocking  Bilateral lower extremity edema improving  Likely in the settings of hypoalbuminemia   Continue protein supplement  Transition to oral Lasix 40 mg daily  Encouraged to keep lower extremity elevated at all times  Continue with knee-high compression stocking  Protein supplement  Monitor BMP

## 2020-08-03 LAB
ANION GAP SERPL CALCULATED.3IONS-SCNC: 7 MMOL/L (ref 4–13)
BUN SERPL-MCNC: 6 MG/DL (ref 5–25)
CALCIUM SERPL-MCNC: 8.9 MG/DL (ref 8.3–10.1)
CHLORIDE SERPL-SCNC: 107 MMOL/L (ref 100–108)
CO2 SERPL-SCNC: 30 MMOL/L (ref 21–32)
CREAT SERPL-MCNC: 0.85 MG/DL (ref 0.6–1.3)
ERYTHROCYTE [DISTWIDTH] IN BLOOD BY AUTOMATED COUNT: 17.1 % (ref 11.6–15.1)
GFR SERPL CREATININE-BSD FRML MDRD: 80 ML/MIN/1.73SQ M
GLUCOSE SERPL-MCNC: 85 MG/DL (ref 65–140)
HCT VFR BLD AUTO: 31.7 % (ref 34.8–46.1)
HGB BLD-MCNC: 9.9 G/DL (ref 11.5–15.4)
MAGNESIUM SERPL-MCNC: 2.3 MG/DL (ref 1.6–2.6)
MCH RBC QN AUTO: 29.6 PG (ref 26.8–34.3)
MCHC RBC AUTO-ENTMCNC: 31.2 G/DL (ref 31.4–37.4)
MCV RBC AUTO: 95 FL (ref 82–98)
PLATELET # BLD AUTO: 329 THOUSANDS/UL (ref 149–390)
PMV BLD AUTO: 11.8 FL (ref 8.9–12.7)
POTASSIUM SERPL-SCNC: 5.1 MMOL/L (ref 3.5–5.3)
RBC # BLD AUTO: 3.35 MILLION/UL (ref 3.81–5.12)
SODIUM SERPL-SCNC: 144 MMOL/L (ref 136–145)
WBC # BLD AUTO: 10.94 THOUSAND/UL (ref 4.31–10.16)

## 2020-08-03 PROCEDURE — 97535 SELF CARE MNGMENT TRAINING: CPT

## 2020-08-03 PROCEDURE — 97110 THERAPEUTIC EXERCISES: CPT

## 2020-08-03 PROCEDURE — 94760 N-INVAS EAR/PLS OXIMETRY 1: CPT

## 2020-08-03 PROCEDURE — 85027 COMPLETE CBC AUTOMATED: CPT | Performed by: STUDENT IN AN ORGANIZED HEALTH CARE EDUCATION/TRAINING PROGRAM

## 2020-08-03 PROCEDURE — 83735 ASSAY OF MAGNESIUM: CPT | Performed by: STUDENT IN AN ORGANIZED HEALTH CARE EDUCATION/TRAINING PROGRAM

## 2020-08-03 PROCEDURE — 94668 MNPJ CHEST WALL SBSQ: CPT

## 2020-08-03 PROCEDURE — 99232 SBSQ HOSP IP/OBS MODERATE 35: CPT | Performed by: INTERNAL MEDICINE

## 2020-08-03 PROCEDURE — 80048 BASIC METABOLIC PNL TOTAL CA: CPT | Performed by: STUDENT IN AN ORGANIZED HEALTH CARE EDUCATION/TRAINING PROGRAM

## 2020-08-03 PROCEDURE — 94640 AIRWAY INHALATION TREATMENT: CPT

## 2020-08-03 PROCEDURE — 97116 GAIT TRAINING THERAPY: CPT

## 2020-08-03 PROCEDURE — 97530 THERAPEUTIC ACTIVITIES: CPT

## 2020-08-03 PROCEDURE — 99232 SBSQ HOSP IP/OBS MODERATE 35: CPT | Performed by: STUDENT IN AN ORGANIZED HEALTH CARE EDUCATION/TRAINING PROGRAM

## 2020-08-03 RX ORDER — POTASSIUM CHLORIDE 20 MEQ/1
40 TABLET, EXTENDED RELEASE ORAL 2 TIMES DAILY
Status: DISCONTINUED | OUTPATIENT
Start: 2020-08-03 | End: 2020-08-03

## 2020-08-03 RX ORDER — POTASSIUM CHLORIDE 20 MEQ/1
20 TABLET, EXTENDED RELEASE ORAL 2 TIMES DAILY
Status: DISCONTINUED | OUTPATIENT
Start: 2020-08-03 | End: 2020-08-04 | Stop reason: HOSPADM

## 2020-08-03 RX ADMIN — HEPARIN SODIUM 5000 UNITS: 5000 INJECTION INTRAVENOUS; SUBCUTANEOUS at 06:08

## 2020-08-03 RX ADMIN — FUROSEMIDE 40 MG: 40 TABLET ORAL at 10:29

## 2020-08-03 RX ADMIN — HEPARIN SODIUM 5000 UNITS: 5000 INJECTION INTRAVENOUS; SUBCUTANEOUS at 17:59

## 2020-08-03 RX ADMIN — POTASSIUM CHLORIDE 20 MEQ: 1500 TABLET, EXTENDED RELEASE ORAL at 18:00

## 2020-08-03 RX ADMIN — LEVALBUTEROL HYDROCHLORIDE 1.25 MG: 1.25 SOLUTION, CONCENTRATE RESPIRATORY (INHALATION) at 20:07

## 2020-08-03 RX ADMIN — ATORVASTATIN CALCIUM 10 MG: 10 TABLET, FILM COATED ORAL at 18:00

## 2020-08-03 RX ADMIN — LEVALBUTEROL HYDROCHLORIDE 1.25 MG: 1.25 SOLUTION, CONCENTRATE RESPIRATORY (INHALATION) at 14:18

## 2020-08-03 RX ADMIN — GUAIFENESIN 200 MG: 100 SOLUTION ORAL at 06:08

## 2020-08-03 RX ADMIN — CHLORHEXIDINE GLUCONATE 0.12% ORAL RINSE 15 ML: 1.2 LIQUID ORAL at 21:34

## 2020-08-03 RX ADMIN — IPRATROPIUM BROMIDE 0.5 MG: 0.5 SOLUTION RESPIRATORY (INHALATION) at 08:17

## 2020-08-03 RX ADMIN — Medication 400 MCG: at 10:29

## 2020-08-03 RX ADMIN — IPRATROPIUM BROMIDE 0.5 MG: 0.5 SOLUTION RESPIRATORY (INHALATION) at 14:18

## 2020-08-03 RX ADMIN — CHLORHEXIDINE GLUCONATE 0.12% ORAL RINSE 15 ML: 1.2 LIQUID ORAL at 10:29

## 2020-08-03 RX ADMIN — THIAMINE HCL TAB 100 MG 100 MG: 100 TAB at 10:29

## 2020-08-03 RX ADMIN — AZITHROMYCIN 500 MG: 500 TABLET, FILM COATED ORAL at 10:29

## 2020-08-03 RX ADMIN — HEPARIN SODIUM 5000 UNITS: 5000 INJECTION INTRAVENOUS; SUBCUTANEOUS at 21:34

## 2020-08-03 RX ADMIN — LEVALBUTEROL HYDROCHLORIDE 1.25 MG: 1.25 SOLUTION, CONCENTRATE RESPIRATORY (INHALATION) at 08:16

## 2020-08-03 RX ADMIN — IPRATROPIUM BROMIDE 0.5 MG: 0.5 SOLUTION RESPIRATORY (INHALATION) at 20:07

## 2020-08-03 NOTE — OCCUPATIONAL THERAPY NOTE
Occupational Therapy Treatment Note        Patient Name: Melanie Sahu  Today's Date: 8/3/2020       08/03/20 1034   Restrictions/Precautions   Weight Bearing Precautions Per Order No   Braces or Orthoses Other (Comment)  (none per pt)   Pain Assessment   Pain Assessment Tool 0-10   Pain Score No Pain   ADL   Where Assessed Standing at sink   Grooming Assistance 7  Independent   LB Bathing Assistance 6  Modified Independent   LB Bathing Deficit Increased time to complete   Toileting Assistance  6  Modified independent   Toileting Deficit Increased time to complete   Bed Mobility   Additional Comments pt received OOB to recliner chair upon OT arrival; at end of session: pt returned seated in recliner chair w/ all needs within reach   Transfers   Sit to Stand 6  Modified independent   Additional items Assist x 1; Armrests   Stand to Sit 6  Modified independent   Additional items Assist x 1; Armrests   Toilet transfer 5  Supervision   Additional items Assist x 1;Standard toilet; Other  (Grab bar use)   Additional Comments Performed w/ no DME   Functional Mobility   Functional Mobility 5  Supervision   Additional items   (no DME)   Toilet Transfers   Toilet Transfer From Other (Comment)  (recliner chair)   Toilet Transfer Type To   Toilet Transfer to Standard toilet   Toilet Transfer Technique Ambulating   Toilet Transfers Supervision   Therapeutic Exercise - ROM   UE-ROM Yes   ROM- Right Upper Extremities   R Shoulder AROM; Flexion; Extension   R Elbow AROM;Elbow flexion;Elbow extension   R Position Seated;Against gravity   R Weight/Reps/Sets 5-10 repetitions of each: scapular elevation/depression, shoulder flex/ext, elbow flex/ext, forearm supination/pronation, and swimming motion   RUE ROM Comment Patient educated on exercises and performed 5-10 repetitions of each in order to confirm understanding and competency of each   ROM - Left Upper Extremities    L Shoulder AROM; Flexion; Extension   L Elbow AROM;Elbow flexion;Elbow extension   L Position Seated;Against gravity   L Weight/Reps/Sets 5-10 repetitions of each: scapular elevation/depression, shoulder flex/ext, elbow flex/ext, forearm supination/pronation, and swimming motion   LUE ROM Comment Patient educated on exercises and performed 5-10 repetitions of each in order to confirm understanding and competency of each   Cognition   Overall Cognitive Status [facility]   Arousal/Participation Alert; Responsive; Cooperative   Attention Within functional limits   Orientation Level Oriented X4   Memory Within functional limits   Following Commands Follows all commands and directions without difficulty   Comments Pt agreeable to OT treatment   Additional Activities   Additional Activities Comments Patient educated and engaged in discussion about energy conservation techniques and deep breathing techniques  Patient verbalized understanding of various energy conservation techniques to incorporate into daily routine in home environment and demonstrated understanding of PLB and diaphragmatic breathing   Activity Tolerance   Activity Tolerance Patient limited by fatigue   Medical Staff Made Aware CASPER Flores confirmed pt appropriate for therapy and made aware of session outcomes; MARIELA Bruner   Assessment   Assessment Patient participated in Skilled OT session this date with interventions consisting of ADL re training with the use of correct body mechnaics, Energy Conservation techniques, deep breathing technique, safety awareness and fall prevention techniques, therapeutic exercise to: increase functional use of BUEs, increase BUE muscle strength ,  therapeutic activities to: increase activity tolerance and increase dynamic sit/ stand balance during functional activity    Patient agreeable to OT treatment session, upon arrival patient was found seated OOB to Recliner, alert, responsive  and in no apparent distress    In comparison to previous session, patient with improvements in functional transfers, functional mobility, activity tolerance, and ADL performance/participation  Patient participated in a variety of functional tasks including grooming while standing at sink level and toileting hygiene, please see above flowsheet for assist levels  Patient ambulated with supervision assist throughout the hallway, pt with noted signs/symptoms of fatigue, which improved with rest  Patient's O2 remained above 93% with functional tasks and ambulation  Patient requiring frequent rest periods and ocassional safety reminders  Patient continues to be functioning below baseline level, occupational performance remains limited secondary to factors listed above and increased risk for falls and injury  From OT standpoint, recommendation at time of d/c would be Home with family support  Patient to benefit from continued Occupational Therapy treatment while in the hospital to address deficits as defined above and maximize level of functional independence with ADLs and functional mobility  Plan   Treatment Interventions ADL retraining;Functional transfer training;UE strengthening/ROM; Endurance training;Patient/family training;Equipment evaluation/education; Compensatory technique education;Cardiac education; Energy conservation; Activityengagement   Goal Expiration Date 08/10/20   OT Treatment Day 1   OT Frequency 3-5x/wk   Recommendation   OT Discharge Recommendation Return to previous environment with social support   Modified Eduar Scale   Modified Baxter Scale 2     Sandra Dose, OTR/L

## 2020-08-03 NOTE — PLAN OF CARE
Problem: PHYSICAL THERAPY ADULT  Goal: Performs mobility at highest level of function for planned discharge setting  See evaluation for individualized goals  Description: Treatment/Interventions: Functional transfer training, LE strengthening/ROM, Elevations, Therapeutic exercise, Endurance training, Patient/family training, Bed mobility, Gait training, Equipment eval/education, Spoke to nursing, OT, Family  Equipment Recommended: Jason Ramirez       See flowsheet documentation for full assessment, interventions and recommendations  Outcome: Progressing  Note: Prognosis: Good  Problem List: Decreased strength, Decreased endurance, Impaired balance, Decreased mobility, Decreased cognition  Assessment: Pt seen for PT treatment session this date with interventions consisting of gait training w/ emphasis on improving pt's ability to ambulate level surfaces x 100' with SBA provided by therapist with no AD, Therapeutic exercise consisting of: AROM 10 reps B LE in sitting position and navigating PT  step x10 trials w/ UE support of RW with step to pattern with SBA  Pt agreeable to PT treatment session upon arrival, pt found seated OOB in recliner, in no apparent distress, A&O x 4 and responsive  In comparison to previous session, pt with improvements in level surface gait tolerance, no LOB observed, initiation of B LE exercises to tolerance without pain reported; improved balance grades by 1/2 grade  B LE HEP handout provided and reviewed for Milly as demonstrated above  Pt w/ good carryover for HEP, verbalizing understanding  PT to assess carryover next session  and Post session: pt returned back to recliner, all needs in reach and RN notified of session findings/recommendations  Continue to recommend Home PT with family support at time of d/c in order to maximize pt's functional independence and safety w/ mobility  Pt continues to be functioning below baseline level, and remains limited 2* factors listed above   PT will continue to see pt while here in order to address the deficits listed above and provide interventions consistent w/ POC in effort to achieve STGs  Barriers to Discharge: None     PT Discharge Recommendation: Home with skilled therapy, Return to previous environment with social support(home PT)     PT - OK to Discharge: Yes(when medically cleared c family support & HHPT)    See flowsheet documentation for full assessment

## 2020-08-03 NOTE — PLAN OF CARE
Problem: Prexisting or High Potential for Compromised Skin Integrity  Goal: Skin integrity is maintained or improved  Description: INTERVENTIONS:  - Identify patients at risk for skin breakdown  - Assess and monitor skin integrity  - Assess and monitor nutrition and hydration status  - Monitor labs   - Assess for incontinence   - Turn and reposition patient  - Assist with mobility/ambulation  - Relieve pressure over bony prominences  - Avoid friction and shearing  - Provide appropriate hygiene as needed including keeping skin clean and dry  - Evaluate need for skin moisturizer/barrier cream  - Collaborate with interdisciplinary team   - Patient/family teaching  - Consider wound care consult   Outcome: Progressing     Problem: Potential for Falls  Goal: Patient will remain free of falls  Description: INTERVENTIONS:  - Assess patient frequently for physical needs  -  Identify cognitive and physical deficits and behaviors that affect risk of falls  -  [place] fall precautions as indicated by assessment   - Educate patient/family on patient safety including physical limitations  - Instruct patient to call for assistance with activity based on assessment  - Modify environment to reduce risk of injury  - Consider OT/PT consult to assist with strengthening/mobility  Outcome: Progressing     Problem: Nutrition/Hydration-ADULT  Goal: Nutrient/Hydration intake appropriate for improving, restoring or maintaining nutritional needs  Description: Monitor and assess patient's nutrition/hydration status for malnutrition  Collaborate with interdisciplinary team and initiate plan and interventions as ordered  Monitor patient's weight and dietary intake as ordered or per policy  Utilize nutrition screening tool and intervene as necessary  Determine patient's food preferences and provide high-protein, high-caloric foods as appropriate       INTERVENTIONS:  - Monitor oral intake, urinary output, labs, and treatment plans  - Assess nutrition and hydration status and recommend course of action  - Evaluate amount of meals eaten  - Assist patient with eating if necessary   - Allow adequate time for meals  - Recommend/ encourage appropriate diets, oral nutritional supplements, and vitamin/mineral supplements  - Order, calculate, and assess calorie counts as needed  - Recommend, monitor, and adjust tube feedings based on assessed needs  - Assess need for intravenous fluids  - Provide nutrition/hydration education as appropriate  - Include patient/family/caregiver in decisions related to nutrition   Outcome: Progressing     Problem: PAIN - ADULT  Goal: Verbalizes/displays adequate comfort level or baseline comfort level  Description: Interventions:  - Encourage patient to monitor pain and request assistance  - Assess pain using appropriate pain scale  - Administer analgesics based on type and severity of pain and evaluate response  - Implement non-pharmacological measures as appropriate and evaluate response  - Consider cultural and social influences on pain and pain management  - Notify physician/advanced practitioner if interventions unsuccessful or patient reports new pain  Outcome: Progressing     Problem: INFECTION - ADULT  Goal: Absence or prevention of progression during hospitalization  Description: INTERVENTIONS:  - Assess and monitor for signs and symptoms of infection  - Monitor lab/diagnostic results  - Monitor all insertion sites, i e  indwelling lines, tubes, and drains  - Monitor endotracheal if appropriate and nasal secretions for changes in amount and color  - [place] appropriate cooling/warming therapies per order  - Administer medications as ordered  - Instruct and encourage patient and family to use good hand hygiene technique  - Identify and instruct in appropriate isolation precautions for identified infection/condition  Outcome: Progressing  Goal: Absence of fever/infection during neutropenic period  Description: INTERVENTIONS:  - Monitor WBC    Outcome: Progressing     Problem: SAFETY ADULT  Goal: Maintain or return to baseline ADL function  Description: INTERVENTIONS:  -  Assess patient's ability to carry out ADLs; assess patient's baseline for ADL function and identify physical deficits which impact ability to perform ADLs (bathing, care of mouth/teeth, toileting, grooming, dressing, etc )  - Assess/evaluate cause of self-care deficits   - Assess range of motion  - Assess patient's mobility; develop plan if impaired  - Assess patient's need for assistive devices and provide as appropriate  - Encourage maximum independence but intervene and supervise when necessary  - Involve family in performance of ADLs  - Assess for home care needs following discharge   - Consider OT consult to assist with ADL evaluation and planning for discharge  - Provide patient education as appropriate  Outcome: Progressing  Goal: Maintain or return mobility status to optimal level  Description: INTERVENTIONS:  - Assess patient's baseline mobility status (ambulation, transfers, stairs, etc )    - Identify cognitive and physical deficits and behaviors that affect mobility  - Identify mobility aids required to assist with transfers and/or ambulation (gait belt, sit-to-stand, lift, walker, cane, etc )  - [place] fall precautions as indicated by assessment  - Record patient progress and toleration of activity level on Mobility SBAR; progress patient to next Phase/Stage  - Instruct patient to call for assistance with activity based on assessment  - Consider rehabilitation consult to assist with strengthening/weightbearing, etc   Outcome: Progressing     Problem: DISCHARGE PLANNING  Goal: Discharge to home or other facility with appropriate resources  Description: INTERVENTIONS:  - Identify barriers to discharge w/patient and caregiver  - Arrange for needed discharge resources and transportation as appropriate  - Identify discharge learning needs (meds, wound care, etc )  - Arrange for interpretive services to assist at discharge as needed  - Refer to Case Management Department for coordinating discharge planning if the patient needs post-hospital services based on physician/advanced practitioner order or complex needs related to functional status, cognitive ability, or social support system  Outcome: Progressing     Problem: Knowledge Deficit  Goal: Patient/family/caregiver demonstrates understanding of disease process, treatment plan, medications, and discharge instructions  Description: Complete learning assessment and assess knowledge base    Interventions:  - Provide teaching at level of understanding  - Provide teaching via preferred learning methods  Outcome: Progressing     Problem: NEUROSENSORY - ADULT  Goal: Achieves stable or improved neurological status  Description: INTERVENTIONS  - Monitor and report changes in neurological status  - Monitor vital signs such as temperature, blood pressure, glucose, and any other labs ordered   - Initiate measures to prevent increased intracranial pressure  - Monitor for seizure activity and implement precautions if appropriate      Outcome: Progressing     Problem: RESPIRATORY - ADULT  Goal: Achieves optimal ventilation and oxygenation  Description: INTERVENTIONS:  - Assess for changes in respiratory status  - Assess for changes in mentation and behavior  - Position to facilitate oxygenation and minimize respiratory effort  - Oxygen administered by appropriate delivery if ordered  - Initiate smoking cessation education as indicated  - Encourage broncho-pulmonary hygiene including cough, deep breathe, Incentive Spirometry  - Assess the need for suctioning and aspirate as needed  - Assess and instruct to report SOB or any respiratory difficulty  - Respiratory Therapy support as indicated  Outcome: Progressing     Problem: GENITOURINARY - ADULT  Goal: Maintains or returns to baseline urinary function  Description: INTERVENTIONS:  - Assess urinary function  - Encourage oral fluids to ensure adequate hydration if ordered  - Administer IV fluids as ordered to ensure adequate hydration  - Administer ordered medications as needed  - Offer frequent toileting  - Follow urinary retention protocol if ordered  Outcome: Progressing  Goal: Urinary catheter remains patent  Description: INTERVENTIONS:  - Assess patency of urinary catheter  - If patient has a chronic restrepo, consider changing catheter if non-functioning  - Follow guidelines for intermittent irrigation of non-functioning urinary catheter  Outcome: Progressing

## 2020-08-03 NOTE — UTILIZATION REVIEW
Continued Stay Review    Date: 8/3                          Current Patient Class: IP  Current Level of Care: MS     HPI:50 y o  female initially admitted on  7/24    Assessment/Plan: septic shock sec to legionella pneumonia  On azithromycin day #10 , cont for 14 days   Lungs diminished , coarse   Saturation 97% on 1 L, 93% on room air at rest       8/3 ID note   Septic shock sec to pneumonia has resolved   On day 10 of abx   Weaned off O2 and monitoring        8/3 OT eval   Return to previous environment w/ social support    Pertinent Labs/Diagnostic Results:     Results from last 7 days   Lab Units 08/03/20  0505 08/01/20  0455 07/31/20  0529 07/30/20  0521 07/29/20  0628   WBC Thousand/uL 10 94* 11 71* 12 52* 12 40* 10 37*   HEMOGLOBIN g/dL 9 9* 8 7* 8 7* 9 1* 8 7*   HEMATOCRIT % 31 7* 27 5* 27 3* 28 5* 26 9*   PLATELETS Thousands/uL 329 286 310 319 270   NEUTROS ABS Thousands/µL  --   --   --   --  7 22     Results from last 7 days   Lab Units 08/03/20  0505 08/01/20  0455 07/31/20  0529 07/30/20  2220 07/30/20  0521 07/29/20  0628   SODIUM mmol/L 144 144 149* 147* 152* 150*   POTASSIUM mmol/L 5 1 3 3* 3 4* 3 1* 3 5 3 7   CHLORIDE mmol/L 107 106 111* 110* 115* 114*   CO2 mmol/L 30 32 31 31 28 30   ANION GAP mmol/L 7 6 7 6 9 6   BUN mg/dL 6 6 6 8 11 12   CREATININE mg/dL 0 85 0 62 0 60 0 65 0 69 0 58*   EGFR ml/min/1 73sq m 80 105 107 104 102 108   CALCIUM mg/dL 8 9 7 9* 7 8* 8 2* 8 2* 7 6*   CALCIUM, IONIZED mmol/L  --   --   --   --  1 12 1 05*   MAGNESIUM mg/dL 2 3  --   --   --  1 9 2 3   PHOSPHORUS mg/dL  --   --   --   --  2 5* 2 0*     Results from last 7 days   Lab Units 08/01/20  0455 07/28/20  0606   AST U/L 60* 159*   ALT U/L 42 37   ALK PHOS U/L 128* 102   TOTAL PROTEIN g/dL 5 3* 4 9*   ALBUMIN g/dL 2 5* 1 6*   TOTAL BILIRUBIN mg/dL 0 80 0 50     Results from last 7 days   Lab Units 07/28/20  1233   POC GLUCOSE mg/dl 118     Results from last 7 days   Lab Units 08/03/20  0505 08/01/20  4375 07/31/20  0529 07/30/20  2220 07/30/20  0521 07/29/20  0628 07/28/20  0606   GLUCOSE RANDOM mg/dL 85 95 95 104 98 112 100     Results from last 7 days   Lab Units 07/30/20  0533 07/29/20  0628 07/28/20  0530   PROCALCITONIN ng/ml 0 36* 0 63* 1 06*     Results from last 7 days   Lab Units 07/30/20  0521   TOTAL COUNTED  100       Vital Signs:   08/03/20 0817                  93 %         None (Room air)    08/03/20 08:16:55   97 6 °F (36 4 °C)   99   16   109/69   82   93 %                Medications:   Scheduled Medications:  atorvastatin, 10 mg, Oral, Daily  azithromycin, 500 mg, Oral, Q24H  chlorhexidine, 15 mL, Swish [employer] Spit, C37F [address]  folic acid, 352 mcg, Oral, Daily  furosemide, 40 mg, Oral, Daily  heparin (porcine), 5,000 Units, Subcutaneous, Q8H ABIGAIL  ipratropium, 0 5 mg, Nebulization, TID  levalbuterol, 1 25 mg, Nebulization, TID  nicotine, 21 mg, Transdermal, Daily  potassium chloride, 20 mEq, Oral, BID  thiamine, 100 mg, Oral, Daily      Continuous IV Infusions:     PRN Meds:  acetaminophen, 650 mg, Oral, Q6H PRN  guaiFENesin, 200 mg, Oral, Q4H PRN        Discharge Plan: TBD     Network Utilization Review Department  [email or web address]  ATTENTION: Please call with any questions or concerns to [phone] and carefully listen to the prompts so that you are directed to the right person  All voicemails are confidential   Baldwin Yu all requests for admission clinical reviews, approved or denied determinations and any other requests to dedicated fax number below belonging to the campus where the patient is receiving treatment   List of dedicated fax numbers for the Facilities:  FACILITY NAME UR FAX NUMBER   ADMISSION DENIALS (Administrative/Medical Necessity) [phone]   [address]  (Maternity/NICU/Pediatrics) [phone]   Guadalupe Fines [address] [facility] [address] [facility] Lizbeth Aguirre [phone]   [address] [facility] [address] [facility] [phone]   [address] [phone]   [address] [facility]  [phone] [address] [phone]

## 2020-08-03 NOTE — SOCIAL WORK
CM reviewed the referrals in All scripts  No accepting facilities for [place] [address]    CM sent out additional referrals

## 2020-08-03 NOTE — ASSESSMENT & PLAN NOTE
Sodium noted 144  Noted with b/l lower extremity edema  Echo Ef 60% with grade 1 diastolic dysfunction  Hyponatremia improved with IV albumin and lasix and lower extremity compression stocking  Bilateral lower extremity edema improving  Continue protein supplement  DC iv albumin  Transition to oral Lasix 40 mg daily  Encouraged to keep lower extremity elevated at all times  Continue with knee-high compression stocking  Protein supplement  Monitor BMP

## 2020-08-03 NOTE — PROGRESS NOTES
Progress Note - Chaka Parish 1970, 48 y o  female MRN: [id number]    Unit/Bed#: -01 Encounter: [id number]    Primary Care Provider: Shala Huerta MD   Date and time admitted to hospital: 7/24/2020  9:08 PM        * Septic shock [facility])  Assessment & Plan  Patient was admitted under ICU level of care for septic shock secondary to Legionella pneumonia  Now off of pressors and downgraded to med surge  Septic shock now Resolved  O2 saturation 97-98% on 1L nasal cannula: 93% on RA  Azithromycin Day # 10  Continue azithromycin for total 14 day course per ID recommendation  Continue to encourage incentive spirometry use, ambulation as able  Wean off of oxygen as able  Home O2 eval tomorrow  Continue respiratory protocol and pulmonary hygiene  Recommended repeat CT chest outpatient in 6-8 weeks after completing antibiotic course to monitor resolution  Patient is agreeable with above plan  Encephalopathy  Assessment & Plan  Likely secondary to septic shock  Resolved        Hypernatremia  Assessment & Plan  Sodium noted 144  Noted with b/l lower extremity edema  Echo Ef 60% with grade 1 diastolic dysfunction  Hyponatremia improved with IV albumin and lasix and lower extremity compression stocking  Bilateral lower extremity edema improving  Continue protein supplement  DC iv albumin  Transition to oral Lasix 40 mg daily  Encouraged to keep lower extremity elevated at all times  Continue with knee-high compression stocking  Protein supplement  Monitor BMP  Legionella pneumonia [facility])  Assessment & Plan  Improving  Continue azithromycin course for refer neck per ID recommendation    Acute respiratory failure with hypoxia ([address] )  Assessment & Plan  Significantly better  Currently O2 saturation 97% on 1 L nasal cannula  O2 saturation 93% on room air    Follow-up with home O2 eval tomorrow prior to discharge  Wean off of oxygen supplement as able        Tobacco use  Assessment & Plan  Counseled for smoking cessation  Hyperlipemia  Assessment & Plan  Continue statin            Hypokalemia  Resolved  Now K 5 1  Decrease p o  Supplement to 20 mg b i d  From 40mg t i d   Monitor BMP      VTE Pharmacologic Prophylaxis:   Pharmacologic: Heparin    Patient Centered Rounds: I have performed bedside rounds with nursing staff today  Discussions with Specialists or Other Care Team Provider: ID    Education and Discussions with Family / Patient:  Son at bedside  Had discussion with  yesterday at bedside as well  Time Spent for Care: 30 minutes  More than 50% of total time spent on counseling and coordination of care as described above  Current Length of Stay: 9 day(s)    Current Patient Status: Inpatient   Certification Statement: The patient will continue to require additional inpatient hospital stay due to hypoxia weaning off of supplement oxygen, awaiting PT/Ot re-evaluation since pt  improved  Discharge Plan: Expected in 24 hours    Code Status: Level 1 - Full Code      Subjective:   Afebrile, hemodynamically stable  Tolerating azithromycin well  Saturation 97% on 1 L, 93% on room air at rest   Clinically patient appears comfortable, in good spirit  Denies chest pain, dyspnea, fever, chills, nausea, vomiting, abdominal pain, diarrhea  Reports feeling overall much better and eager to go home  Objective:     Vitals:   Temp (24hrs), Av 2 °F (36 8 °C), Min:97 6 °F (36 4 °C), Max:98 5 °F (36 9 °C)    Temp:  [97 6 °F (36 4 °C)-98 5 °F (36 9 °C)] 97 6 °F (36 4 °C)  HR:  [84-99] 99  Resp:  [16-18] 16  BP: (109-124)/(69-83) 109/69  SpO2:  [88 %-97 %] 93 %  Body mass index is 24 55 kg/m²  Input and Output Summary (last 24 hours):        Intake/Output Summary (Last 24 hours) at 8/3/2020 1036  Last data filed at 8/3/2020 0849  Gross per 24 hour   Intake 970 ml   Output 750 ml   Net 220 ml       Physical Exam:     Physical Exam   Constitutional: She is oriented to person, place, and time  No distress  HENT:   Head: Normocephalic and atraumatic  Eyes: Pupils are equal, round, and reactive to light  EOM are normal    Neck: Normal range of motion  Neck supple  No JVD present  Cardiovascular: Normal rate and regular rhythm  Pulmonary/Chest: Effort normal  No stridor  No respiratory distress  Rhonchorous lung sounds noted  Improving   Abdominal: Soft  Bowel sounds are normal  She exhibits no distension  There is no abdominal tenderness  Musculoskeletal: Normal range of motion  General: Edema (Bilateral lower extremity edema improving ) present  Neurological: She is alert and oriented to person, place, and time  Skin: She is not diaphoretic  Psychiatric: Her behavior is normal      Additional Data:     Labs:    Results from last 7 days   Lab Units 08/03/20  0505  07/30/20  0521 07/29/20  0628   WBC Thousand/uL 10 94*   < > 12 40* 10 37*   HEMOGLOBIN g/dL 9 9*   < > 9 1* 8 7*   HEMATOCRIT % 31 7*   < > 28 5* 26 9*   PLATELETS Thousands/uL 329   < > 319 270   NEUTROS PCT %  --   --   --  70   LYMPHS PCT %  --   --   --  16   LYMPHO PCT %  --   --  21  --    MONOS PCT %  --   --   --  12   MONO PCT %  --   --  11  --    EOS PCT %  --   --  0 0    < > = values in this interval not displayed       Results from last 7 days   Lab Units 08/03/20  0505 08/01/20  0455   SODIUM mmol/L 144 144   POTASSIUM mmol/L 5 1 3 3*   CHLORIDE mmol/L 107 106   CO2 mmol/L 30 32   BUN mg/dL 6 6   CREATININE mg/dL 0 85 0 62   ANION GAP mmol/L 7 6   CALCIUM mg/dL 8 9 7 9*   ALBUMIN g/dL  --  2 5*   TOTAL BILIRUBIN mg/dL  --  0 80   ALK PHOS U/L  --  128*   ALT U/L  --  42   AST U/L  --  60*   GLUCOSE RANDOM mg/dL 85 95         Results from last 7 days   Lab Units 07/28/20  1233   POC GLUCOSE mg/dl 118         Results from last 7 days   Lab Units 07/30/20  0533 07/29/20  0628 07/28/20  0530   PROCALCITONIN ng/ml 0 36* 0 63* 1 06*           * I Have Reviewed All Lab Data Listed Above   * Additional Pertinent Lab Tests Reviewed: All Labs Within Last 24 Hours Reviewed      Recent Cultures (last 7 days):           Last 24 Hours Medication List:   acetaminophen, 650 mg, Oral, Q6H PRN, Bret Asp, PA-C  atorvastatin, 10 mg, Oral, Daily, Bret Asp, PA-C  azithromycin, 500 mg, Oral, Q24H, Bret Asp, PA-C  chlorhexidine, 15 mL, Swish [employer] [place], Q12H [address], Bret Asp, PA-C  folic acid, 222 mcg, Oral, Daily, Bret Asp, PA-C  furosemide, 40 mg, Oral, Daily, Asim HOLT MD  guaiFENesin, 200 mg, Oral, Q4H PRN, JOEL Bhakta  heparin (porcine), 5,000 Units, Subcutaneous, Q8H [address], Bret Asp, PA-C  ipratropium, 0 5 mg, Nebulization, TID, Bret Asp, PA-C  levalbuterol, 1 25 mg, Nebulization, TID, Bret Asp, PA-C  nicotine, 21 mg, Transdermal, Daily, Bret Asp, PA-C  potassium chloride, 40 mEq, Oral, BID, Asim HOLT MD  thiamine, 100 mg, Oral, Daily, Bret Asp, PA-C         Today, Patient Was Seen By: Alex Aguilar MD    ** Please Note: Dictation voice to text software may have been used in the creation of this document   **

## 2020-08-03 NOTE — PROGRESS NOTES
Progress Note - Infectious Disease   Manuela Asher 48 y o  female MRN: [id number]  Unit/Bed#: -01 Encounter: [id number]      Impression/Recommendations:  1  Septic shock, POA  Secondary to pneumonia  Now resolved  2   Legionella pneumonia  Source likely old AC units  - continue azithromycin to complete a total of 14 days  3   S/p Acute hypoxic respiratory failure secondary to legionella pneumonia  4   Micrococcus bacteremia  Likely contaminant  5   ABELARDO - resolved  6   Encephalopathy - improved  7   Nicotine abuse  8  Chronic alcohol abuse  Antibiotics:   Azithromycin #10    Subjective:  Patient seen on AM rounds  Denies fevers, chills, or sweats  Denies nausea, vomiting, or diarrhea  States she has now been taken off O2  Objective:  Vitals:  Temp:  [97 6 °F (36 4 °C)-98 5 °F (36 9 °C)] 97 6 °F (36 4 °C)  HR:  [84-99] 99  Resp:  [16-18] 16  BP: (109-124)/(69-83) 109/69  SpO2:  [88 %-97 %] 93 %  Temp (24hrs), Av 2 °F (36 8 °C), Min:97 6 °F (36 4 °C), Max:98 5 °F (36 9 °C)  Current: Temperature: 97 6 °F (36 4 °C)    Physical Exam:   General:  No acute distress  Psychiatric:  Awake and alert  Heart: +s1/s2  Pulmonary:  Normal respiratory excursion without accessory muscle use  Abdomen:  Soft, nontender  Extremities:  No edema  Skin:  No rashes    Lab Results:  I have personally reviewed pertinent labs  Results from last 7 days   Lab Units 20  0505 20  0455 20  0529  20  0606   POTASSIUM mmol/L 5 1 3 3* 3 4*   < > 3 2*   CHLORIDE mmol/L 107 106 111*   < > 111*   CO2 mmol/L 30 32 31   < > 27   BUN mg/dL 6 6 6   < > 13   CREATININE mg/dL 0 85 0 62 0 60   < > 0 61   EGFR ml/min/1 73sq m 80 105 107   < > 106   CALCIUM mg/dL 8 9 7 9* 7 8*   < > 7 4*   AST U/L  --  60*  --   --  159*   ALT U/L  --  42  --   --  37   ALK PHOS U/L  --  128*  --   --  102    < > = values in this interval not displayed       Results from last 7 days   Lab Units 20  7341 08/01/20  0455 07/31/20  0529   WBC Thousand/uL 10 94* 11 71* 12 52*   HEMOGLOBIN g/dL 9 9* 8 7* 8 7*   PLATELETS Thousands/uL 329 286 310           Imaging Studies:   I have personally reviewed pertinent imaging study reports and images in PACS  EKG, Pathology, and Other Studies:   I have personally reviewed pertinent reports

## 2020-08-03 NOTE — PLAN OF CARE
Problem: OCCUPATIONAL THERAPY ADULT  Goal: Performs self-care activities at highest level of function for planned discharge setting  See evaluation for individualized goals  Description: Treatment Interventions: ADL retraining, Functional transfer training, UE strengthening/ROM, Endurance training, Patient/family training, Equipment evaluation/education, Compensatory technique education, Cardiac education, Activityengagement, Energy conservation, Continued evaluation          See flowsheet documentation for full assessment, interventions and recommendations  Outcome: Progressing  Note: Limitation: Decreased ADL status, Decreased Safe judgement during ADL, Decreased endurance, Decreased self-care trans, Decreased high-level ADLs  Prognosis: Good  Assessment: Patient participated in Skilled OT session this date with interventions consisting of ADL re training with the use of correct body mechnaics, Energy Conservation techniques, deep breathing technique, safety awareness and fall prevention techniques, therapeutic exercise to: increase functional use of BUEs, increase BUE muscle strength ,  therapeutic activities to: increase activity tolerance and increase dynamic sit/ stand balance during functional activity    Patient agreeable to OT treatment session, upon arrival patient was found seated OOB to Recliner, alert, responsive  and in no apparent distress  In comparison to previous session, patient with improvements in functional transfers, functional mobility, activity tolerance, and ADL performance/participation  Patient participated in a variety of functional tasks including grooming while standing at sink level and toileting hygiene, please see above flowsheet for assist levels  Patient ambulated with supervision assist throughout the hallway, pt with noted signs/symptoms of fatigue, which improved with rest  Patient's O2 remained above 93% with functional tasks and ambulation   Patient requiring frequent rest periods and ocassional safety reminders  Patient continues to be functioning below baseline level, occupational performance remains limited secondary to factors listed above and increased risk for falls and injury  From OT standpoint, recommendation at time of d/c would be Home with family support  Patient to benefit from continued Occupational Therapy treatment while in the hospital to address deficits as defined above and maximize level of functional independence with ADLs and functional mobility        OT Discharge Recommendation: Return to previous environment with social support

## 2020-08-03 NOTE — SOCIAL WORK
Cm discussed pt in rounding  The pt is not medically stable for dc  The continues to be weaned off O2  Dc anticipated in 24hrs  DC plan will be home with PT vs Family support  CM to follow up on [facility] referrals

## 2020-08-03 NOTE — ASSESSMENT & PLAN NOTE
Patient was admitted under ICU level of care for septic shock secondary to Legionella pneumonia  Now off of pressors and downgraded to med surge  Resolved  Doing well  O2 saturation 97-98% on 1L nasal cannula: 93% on RA  Azithromycin Day # 10  Continue azithromycin for total 14 day course per ID recommendation  Continue to encourage incentive spirometry use, ambulation as able  Wean off of oxygen as able  Home o2 eval tomorrow prior to DC  Continue respiratory protocol and pulmonary hygiene  Recommended repeat CT chest outpatient in 6-8 weeks after completing antibiotic course to monitor resolution

## 2020-08-03 NOTE — ASSESSMENT & PLAN NOTE
Significantly better  Currently O2 saturation 97% on 1 L nasal cannula  O2 saturation 93% on room air    Follow-up with home O2 eval tomorrow prior to discharge  Wean off of oxygen supplement as able

## 2020-08-03 NOTE — PHYSICAL THERAPY NOTE
Physical Therapy Treatment Note       08/03/20 1035   Pain Assessment   Pain Assessment Tool 0-10   Pain Score No Pain   Restrictions/Precautions   Weight Bearing Precautions Per Order No   Braces or Orthoses   (none at baseline)   Other Precautions Fall Risk   General   Chart Reviewed Yes   Response to Previous Treatment Patient with no complaints from previous session  Family/Caregiver Present Yes   Cognition   Overall Cognitive Status WFL   Arousal/Participation Alert; Cooperative   Attention Within functional limits   Orientation Level Oriented X4   Memory Decreased recall of recent events   Following Commands Follows multistep commands without difficulty   Comments pt agreeable to PT session   Subjective   Subjective "I'm doing alot better"   Bed Mobility   Additional Comments pt received OOB to recliner upon arrival   Transfers   Sit to Stand 5  Supervision   Additional items Assist x 1; Increased time required;Verbal cues   Stand to Sit 5  Supervision   Additional items Assist x 1; Increased time required;Verbal cues   Additional Comments pt denies any lightheadedness,dizziness, SOB, pain   Ambulation/Elevation   Gait pattern Short stride; Step to  (improved gavin, no LOB)   Gait Assistance 5  Supervision   Additional items Assist x 1;Verbal cues   Assistive Device None   Distance 100'   Stair Management Assistance 5  Supervision   Additional items Assist x 1;Verbal cues   Stair Management Technique Step to pattern; Foreward   Number of Stairs 10  (PT  step x10 reps)   Ramp Technique Not tested   Balance   Static Sitting Good   Dynamic Sitting Fair +   Static Standing Fair   Dynamic Standing Fair -   Ambulatory Fair -   Endurance Deficit   Endurance Deficit Yes   Activity Tolerance   Activity Tolerance Patient limited by fatigue   Medical Staff Made Aware FARSHAD Bruner   Nurse Made Aware CASPER Joe verbalized pt appropriate to see, made aware of session outcome/recs   Exercises   Quad Sets Sitting;10 reps;AROM; Bilateral   Heelslides Sitting;10 reps;AROM; Bilateral   Glute Sets Sitting;10 reps;AROM; Bilateral   Hip Abduction Sitting;10 reps;AROM; Bilateral   Hip Adduction Sitting;10 reps;AROM; Bilateral  (returning to midline)   Knee AROM Long Arc Quad Sitting;10 reps;AROM; Bilateral   Ankle Pumps Sitting;10 reps;AROM; Bilateral  (& ankle circles both directions)   Marching Sitting;10 reps;AROM; Bilateral   Assessment   Prognosis Good   Problem List Decreased strength;Decreased endurance; Impaired balance;Decreased mobility; Decreased cognition   Assessment Pt seen for PT treatment session this date with interventions consisting of gait training w/ emphasis on improving pt's ability to ambulate level surfaces x 100' with SBA provided by therapist with no AD, Therapeutic exercise consisting of: AROM 10 reps B LE in sitting position and navigating PT  step x10 trials w/ UE support of RW with step to pattern with SBA  Pt agreeable to PT treatment session upon arrival, pt found seated OOB in recliner, in no apparent distress, A&O x 4 and responsive  In comparison to previous session, pt with improvements in level surface gait tolerance, no LOB observed, initiation of B LE exercises to tolerance without pain reported; improved balance grades by 1/2 grade  B LE HEP handout provided and reviewed for Milly as demonstrated above  Pt w/ good carryover for HEP, verbalizing understanding  PT to assess carryover next session  and Post session: pt returned back to recliner, all needs in reach and RN notified of session findings/recommendations  Continue to recommend Home PT with family support at time of d/c in order to maximize pt's functional independence and safety w/ mobility  Pt continues to be functioning below baseline level, and remains limited 2* factors listed above   PT will continue to see pt while here in order to address the deficits listed above and provide interventions consistent w/ POC in effort to achieve STGs  Barriers to Discharge None   Goals   Patient Goals to return home   STG Expiration Date 08/06/20   Short Term Goal #1 STGs remain appropriate   PT Treatment Day 1   Plan   Treatment/Interventions Functional transfer training;LE strengthening/ROM; Elevations; Therapeutic exercise; Endurance training;Patient/family training;Bed mobility;Gait training;Equipment eval/education;Spoke to nursing;OT;Family;Spoke to case management   Progress Progressing toward goals   PT Frequency   (3-5x/wk)   Recommendation   PT Discharge Recommendation Home with skilled therapy; Return to previous environment with social support  (home PT)   Equipment Recommended   (none at this time)   PT - OK to Discharge Yes  (when medically cleared c family [id number])       True Nielson PT    Time of PT treatment session: [id number]

## 2020-08-03 NOTE — SOCIAL WORK
Cm spoke with PT/OT and explained that there were no accepting facilities for the pt [facility]  PT/OT explained that they provided the pt with various sheets for exercises to complete in case there is not an accepting facility    PT does not anticipate that the pt will require too much PT/OT at home

## 2020-08-03 NOTE — SOCIAL WORK
CM spoke with Occupational therapy and the recommendation has changed to Home with family support  CM will await the PT rec for the pt

## 2020-08-04 VITALS
HEIGHT: 66 IN | TEMPERATURE: 97.7 F | HEART RATE: 97 BPM | DIASTOLIC BLOOD PRESSURE: 71 MMHG | SYSTOLIC BLOOD PRESSURE: 113 MMHG | OXYGEN SATURATION: 93 % | BODY MASS INDEX: 24.45 KG/M2 | RESPIRATION RATE: 17 BRPM | WEIGHT: 152.12 LBS

## 2020-08-04 PROBLEM — I51.89 DIASTOLIC DYSFUNCTION: Status: ACTIVE | Noted: 2020-08-04

## 2020-08-04 LAB
ANION GAP SERPL CALCULATED.3IONS-SCNC: 9 MMOL/L (ref 4–13)
BUN SERPL-MCNC: 7 MG/DL (ref 5–25)
CALCIUM SERPL-MCNC: 9.5 MG/DL (ref 8.3–10.1)
CHLORIDE SERPL-SCNC: 101 MMOL/L (ref 100–108)
CO2 SERPL-SCNC: 29 MMOL/L (ref 21–32)
CREAT SERPL-MCNC: 0.91 MG/DL (ref 0.6–1.3)
GFR SERPL CREATININE-BSD FRML MDRD: 74 ML/MIN/1.73SQ M
GLUCOSE SERPL-MCNC: 91 MG/DL (ref 65–140)
POTASSIUM SERPL-SCNC: 4.6 MMOL/L (ref 3.5–5.3)
SODIUM SERPL-SCNC: 139 MMOL/L (ref 136–145)

## 2020-08-04 PROCEDURE — 94760 N-INVAS EAR/PLS OXIMETRY 1: CPT

## 2020-08-04 PROCEDURE — 94668 MNPJ CHEST WALL SBSQ: CPT

## 2020-08-04 PROCEDURE — 94640 AIRWAY INHALATION TREATMENT: CPT

## 2020-08-04 PROCEDURE — 80048 BASIC METABOLIC PNL TOTAL CA: CPT | Performed by: STUDENT IN AN ORGANIZED HEALTH CARE EDUCATION/TRAINING PROGRAM

## 2020-08-04 PROCEDURE — 99239 HOSP IP/OBS DSCHRG MGMT >30: CPT | Performed by: INTERNAL MEDICINE

## 2020-08-04 RX ORDER — AZITHROMYCIN 500 MG/1
500 TABLET, FILM COATED ORAL EVERY 24 HOURS
Qty: 3 TABLET | Refills: 0 | Status: SHIPPED | OUTPATIENT
Start: 2020-08-05 | End: 2020-08-08

## 2020-08-04 RX ADMIN — POTASSIUM CHLORIDE 20 MEQ: 1500 TABLET, EXTENDED RELEASE ORAL at 08:49

## 2020-08-04 RX ADMIN — LEVALBUTEROL HYDROCHLORIDE 1.25 MG: 1.25 SOLUTION, CONCENTRATE RESPIRATORY (INHALATION) at 08:07

## 2020-08-04 RX ADMIN — IPRATROPIUM BROMIDE 0.5 MG: 0.5 SOLUTION RESPIRATORY (INHALATION) at 08:07

## 2020-08-04 RX ADMIN — Medication 400 MCG: at 08:49

## 2020-08-04 RX ADMIN — HEPARIN SODIUM 5000 UNITS: 5000 INJECTION INTRAVENOUS; SUBCUTANEOUS at 06:02

## 2020-08-04 RX ADMIN — THIAMINE HCL TAB 100 MG 100 MG: 100 TAB at 08:49

## 2020-08-04 RX ADMIN — FUROSEMIDE 40 MG: 40 TABLET ORAL at 08:49

## 2020-08-04 RX ADMIN — AZITHROMYCIN 500 MG: 500 TABLET, FILM COATED ORAL at 08:49

## 2020-08-04 NOTE — DISCHARGE INSTR - AVS FIRST PAGE
· Legionella pneumonia  Needs three more days of antibiotics to complete 14 day course    Should have repeat CT scan of chest in 6-8 weeks to ensure resolution of pneumonia

## 2020-08-04 NOTE — PLAN OF CARE
Problem: Prexisting or High Potential for Compromised Skin Integrity  Goal: Skin integrity is maintained or improved  Description: INTERVENTIONS:  - Identify patients at risk for skin breakdown  - Assess and monitor skin integrity  - Assess and monitor nutrition and hydration status  - Monitor labs   - Assess for incontinence   - Turn and reposition patient  - Assist with mobility/ambulation  - Relieve pressure over bony prominences  - Avoid friction and shearing  - Provide appropriate hygiene as needed including keeping skin clean and dry  - Evaluate need for skin moisturizer/barrier cream  - Collaborate with interdisciplinary team   - Patient/family teaching  - Consider wound care consult   8/4/2020 1404 by Minnie Resendiz RN  Outcome: Adequate for Discharge  8/4/2020 1054 by Minnie Resendiz RN  Outcome: Progressing     Problem: Potential for Falls  Goal: Patient will remain free of falls  Description: INTERVENTIONS:  - Assess patient frequently for physical needs  -  Identify cognitive and physical deficits and behaviors that affect risk of falls  -  [place] fall precautions as indicated by assessment   - Educate patient/family on patient safety including physical limitations  - Instruct patient to call for assistance with activity based on assessment  - Modify environment to reduce risk of injury  - Consider OT/PT consult to assist with strengthening/mobility  8/4/2020 1404 by Minnie Resendiz RN  Outcome: Adequate for Discharge  8/4/2020 1054 by Minnie Resendiz RN  Outcome: Progressing     Problem: Nutrition/Hydration-ADULT  Goal: Nutrient/Hydration intake appropriate for improving, restoring or maintaining nutritional needs  Description: Monitor and assess patient's nutrition/hydration status for malnutrition  Collaborate with interdisciplinary team and initiate plan and interventions as ordered  Monitor patient's weight and dietary intake as ordered or per policy   Utilize nutrition screening tool and intervene as necessary  Determine patient's food preferences and provide high-protein, high-caloric foods as appropriate       INTERVENTIONS:  - Monitor oral intake, urinary output, labs, and treatment plans  - Assess nutrition and hydration status and recommend course of action  - Evaluate amount of meals eaten  - Assist patient with eating if necessary   - Allow adequate time for meals  - Recommend/ encourage appropriate diets, oral nutritional supplements, and vitamin/mineral supplements  - Order, calculate, and assess calorie counts as needed  - Recommend, monitor, and adjust tube feedings based on assessed needs  - Assess need for intravenous fluids  - Provide nutrition/hydration education as appropriate  - Include patient/family/caregiver in decisions related to nutrition   8/4/2020 1404 by Beena Quach RN  Outcome: Adequate for Discharge  8/4/2020 1054 by Beena Quach RN  Outcome: Progressing     Problem: PAIN - ADULT  Goal: Verbalizes/displays adequate comfort level or baseline comfort level  Description: Interventions:  - Encourage patient to monitor pain and request assistance  - Assess pain using appropriate pain scale  - Administer analgesics based on type and severity of pain and evaluate response  - Implement non-pharmacological measures as appropriate and evaluate response  - Consider cultural and social influences on pain and pain management  - Notify physician/advanced practitioner if interventions unsuccessful or patient reports new pain  8/4/2020 1404 by Beena Quach RN  Outcome: Adequate for Discharge  8/4/2020 1054 by Beena Quach RN  Outcome: Progressing     Problem: INFECTION - ADULT  Goal: Absence or prevention of progression during hospitalization  Description: INTERVENTIONS:  - Assess and monitor for signs and symptoms of infection  - Monitor lab/diagnostic results  - Monitor all insertion sites, i e  indwelling lines, tubes, and drains  - Monitor endotracheal if appropriate and nasal secretions for changes in amount and color  - Vincent appropriate cooling/warming therapies per order  - Administer medications as ordered  - Instruct and encourage patient and family to use good hand hygiene technique  - Identify and instruct in appropriate isolation precautions for identified infection/condition  8/4/2020 1404 by Annamarie West RN  Outcome: Adequate for Discharge  8/4/2020 1054 by Annamarie West RN  Outcome: Progressing  Goal: Absence of fever/infection during neutropenic period  Description: INTERVENTIONS:  - Monitor WBC    8/4/2020 1404 by Annamarie West RN  Outcome: Adequate for Discharge  8/4/2020 1054 by Annamarie West RN  Outcome: Progressing     Problem: SAFETY ADULT  Goal: Maintain or return to baseline ADL function  Description: INTERVENTIONS:  -  Assess patient's ability to carry out ADLs; assess patient's baseline for ADL function and identify physical deficits which impact ability to perform ADLs (bathing, care of mouth/teeth, toileting, grooming, dressing, etc )  - Assess/evaluate cause of self-care deficits   - Assess range of motion  - Assess patient's mobility; develop plan if impaired  - Assess patient's need for assistive devices and provide as appropriate  - Encourage maximum independence but intervene and supervise when necessary  - Involve family in performance of ADLs  - Assess for home care needs following discharge   - Consider OT consult to assist with ADL evaluation and planning for discharge  - Provide patient education as appropriate  8/4/2020 1404 by Annamarie West RN  Outcome: Adequate for Discharge  8/4/2020 1054 by Annamarie West RN  Outcome: Progressing  Goal: Maintain or return mobility status to optimal level  Description: INTERVENTIONS:  - Assess patient's baseline mobility status (ambulation, transfers, stairs, etc )    - Identify cognitive and physical deficits and behaviors that affect mobility  - Identify mobility aids required to assist with transfers and/or ambulation (gait belt, sit-to-stand, lift, walker, cane, etc )  - [place] fall precautions as indicated by assessment  - Record patient progress and toleration of activity level on Mobility SBAR; progress patient to next Phase/Stage  - Instruct patient to call for assistance with activity based on assessment  - Consider rehabilitation consult to assist with strengthening/weightbearing, etc   8/4/2020 1404 by Sheldon Balderas RN  Outcome: Adequate for Discharge  8/4/2020 1054 by Sheldon Balderas RN  Outcome: Progressing     Problem: DISCHARGE PLANNING  Goal: Discharge to home or other facility with appropriate resources  Description: INTERVENTIONS:  - Identify barriers to discharge w/patient and caregiver  - Arrange for needed discharge resources and transportation as appropriate  - Identify discharge learning needs (meds, wound care, etc )  - Arrange for interpretive services to assist at discharge as needed  - Refer to Case Management Department for coordinating discharge planning if the patient needs post-hospital services based on physician/advanced practitioner order or complex needs related to functional status, cognitive ability, or social support system  8/4/2020 1404 by Sheldon Balderas RN  Outcome: Adequate for Discharge  8/4/2020 1054 by Sheldon Balderas RN  Outcome: Progressing     Problem: Knowledge Deficit  Goal: Patient/family/caregiver demonstrates understanding of disease process, treatment plan, medications, and discharge instructions  Description: Complete learning assessment and assess knowledge base    Interventions:  - Provide teaching at level of understanding  - Provide teaching via preferred learning methods  8/4/2020 1404 by Sheldon Balderas RN  Outcome: Adequate for Discharge  8/4/2020 1054 by Sheldon Balderas RN  Outcome: Progressing     Problem: NEUROSENSORY - ADULT  Goal: Achieves stable or improved neurological status  Description: INTERVENTIONS  - Monitor and report changes in neurological status  - Monitor vital signs such as temperature, blood pressure, glucose, and any other labs ordered   - Initiate measures to prevent increased intracranial pressure  - Monitor for seizure activity and implement precautions if appropriate      8/4/2020 1404 by Caesar Dunbar RN  Outcome: Adequate for Discharge  8/4/2020 1054 by Caesar Dunbar RN  Outcome: Progressing     Problem: RESPIRATORY - ADULT  Goal: Achieves optimal ventilation and oxygenation  Description: INTERVENTIONS:  - Assess for changes in respiratory status  - Assess for changes in mentation and behavior  - Position to facilitate oxygenation and minimize respiratory effort  - Oxygen administered by appropriate delivery if ordered  - Initiate smoking cessation education as indicated  - Encourage broncho-pulmonary hygiene including cough, deep breathe, Incentive Spirometry  - Assess the need for suctioning and aspirate as needed  - Assess and instruct to report SOB or any respiratory difficulty  - Respiratory Therapy support as indicated  8/4/2020 1404 by Caesar Dunbar RN  Outcome: Adequate for Discharge  8/4/2020 1054 by Caesar Dunbar RN  Outcome: Progressing     Problem: GENITOURINARY - ADULT  Goal: Maintains or returns to baseline urinary function  Description: INTERVENTIONS:  - Assess urinary function  - Encourage oral fluids to ensure adequate hydration if ordered  - Administer IV fluids as ordered to ensure adequate hydration  - Administer ordered medications as needed  - Offer frequent toileting  - Follow urinary retention protocol if ordered  8/4/2020 1404 by Caesar Dunbar RN  Outcome: Adequate for Discharge  8/4/2020 1054 by Caesar Dunbar RN  Outcome: Progressing  Goal: Urinary catheter remains patent  Description: INTERVENTIONS:  - Assess patency of urinary catheter  - If patient has a chronic restrepo, consider changing catheter if non-functioning  - Follow guidelines for intermittent irrigation of non-functioning urinary catheter  8/4/2020 1404 by Phoenix Moncada RN  Outcome: Adequate for Discharge  8/4/2020 1054 by Phoenix Moncada RN  Outcome: Progressing

## 2020-08-04 NOTE — ASSESSMENT & PLAN NOTE
· Patient was admitted under ICU level of care for septic shock secondary to legionella pneumonia initially requiring vasopressors  · Was being followed by infectious disease    Currently antibiotic day ##11/14 will be discharged with azithromycin

## 2020-08-04 NOTE — PLAN OF CARE
Problem: Prexisting or High Potential for Compromised Skin Integrity  Goal: Skin integrity is maintained or improved  Description: INTERVENTIONS:  - Identify patients at risk for skin breakdown  - Assess and monitor skin integrity  - Assess and monitor nutrition and hydration status  - Monitor labs   - Assess for incontinence   - Turn and reposition patient  - Assist with mobility/ambulation  - Relieve pressure over bony prominences  - Avoid friction and shearing  - Provide appropriate hygiene as needed including keeping skin clean and dry  - Evaluate need for skin moisturizer/barrier cream  - Collaborate with interdisciplinary team   - Patient/family teaching  - Consider wound care consult   Outcome: Progressing     Problem: Potential for Falls  Goal: Patient will remain free of falls  Description: INTERVENTIONS:  - Assess patient frequently for physical needs  -  Identify cognitive and physical deficits and behaviors that affect risk of falls  -  [place] fall precautions as indicated by assessment   - Educate patient/family on patient safety including physical limitations  - Instruct patient to call for assistance with activity based on assessment  - Modify environment to reduce risk of injury  - Consider OT/PT consult to assist with strengthening/mobility  Outcome: Progressing     Problem: Nutrition/Hydration-ADULT  Goal: Nutrient/Hydration intake appropriate for improving, restoring or maintaining nutritional needs  Description: Monitor and assess patient's nutrition/hydration status for malnutrition  Collaborate with interdisciplinary team and initiate plan and interventions as ordered  Monitor patient's weight and dietary intake as ordered or per policy  Utilize nutrition screening tool and intervene as necessary  Determine patient's food preferences and provide high-protein, high-caloric foods as appropriate       INTERVENTIONS:  - Monitor oral intake, urinary output, labs, and treatment plans  - Assess nutrition and hydration status and recommend course of action  - Evaluate amount of meals eaten  - Assist patient with eating if necessary   - Allow adequate time for meals  - Recommend/ encourage appropriate diets, oral nutritional supplements, and vitamin/mineral supplements  - Order, calculate, and assess calorie counts as needed  - Recommend, monitor, and adjust tube feedings based on assessed needs  - Assess need for intravenous fluids  - Provide nutrition/hydration education as appropriate  - Include patient/family/caregiver in decisions related to nutrition   Outcome: Progressing     Problem: PAIN - ADULT  Goal: Verbalizes/displays adequate comfort level or baseline comfort level  Description: Interventions:  - Encourage patient to monitor pain and request assistance  - Assess pain using appropriate pain scale  - Administer analgesics based on type and severity of pain and evaluate response  - Implement non-pharmacological measures as appropriate and evaluate response  - Consider cultural and social influences on pain and pain management  - Notify physician/advanced practitioner if interventions unsuccessful or patient reports new pain  Outcome: Progressing     Problem: INFECTION - ADULT  Goal: Absence or prevention of progression during hospitalization  Description: INTERVENTIONS:  - Assess and monitor for signs and symptoms of infection  - Monitor lab/diagnostic results  - Monitor all insertion sites, i e  indwelling lines, tubes, and drains  - Monitor endotracheal if appropriate and nasal secretions for changes in amount and color  - [place] appropriate cooling/warming therapies per order  - Administer medications as ordered  - Instruct and encourage patient and family to use good hand hygiene technique  - Identify and instruct in appropriate isolation precautions for identified infection/condition  Outcome: Progressing  Goal: Absence of fever/infection during neutropenic period  Description: INTERVENTIONS:  - Monitor WBC    Outcome: Progressing     Problem: SAFETY ADULT  Goal: Maintain or return to baseline ADL function  Description: INTERVENTIONS:  -  Assess patient's ability to carry out ADLs; assess patient's baseline for ADL function and identify physical deficits which impact ability to perform ADLs (bathing, care of mouth/teeth, toileting, grooming, dressing, etc )  - Assess/evaluate cause of self-care deficits   - Assess range of motion  - Assess patient's mobility; develop plan if impaired  - Assess patient's need for assistive devices and provide as appropriate  - Encourage maximum independence but intervene and supervise when necessary  - Involve family in performance of ADLs  - Assess for home care needs following discharge   - Consider OT consult to assist with ADL evaluation and planning for discharge  - Provide patient education as appropriate  Outcome: Progressing  Goal: Maintain or return mobility status to optimal level  Description: INTERVENTIONS:  - Assess patient's baseline mobility status (ambulation, transfers, stairs, etc )    - Identify cognitive and physical deficits and behaviors that affect mobility  - Identify mobility aids required to assist with transfers and/or ambulation (gait belt, sit-to-stand, lift, walker, cane, etc )  - [place] fall precautions as indicated by assessment  - Record patient progress and toleration of activity level on Mobility SBAR; progress patient to next Phase/Stage  - Instruct patient to call for assistance with activity based on assessment  - Consider rehabilitation consult to assist with strengthening/weightbearing, etc   Outcome: Progressing     Problem: DISCHARGE PLANNING  Goal: Discharge to home or other facility with appropriate resources  Description: INTERVENTIONS:  - Identify barriers to discharge w/patient and caregiver  - Arrange for needed discharge resources and transportation as appropriate  - Identify discharge learning needs (meds, wound care, etc )  - Arrange for interpretive services to assist at discharge as needed  - Refer to Case Management Department for coordinating discharge planning if the patient needs post-hospital services based on physician/advanced practitioner order or complex needs related to functional status, cognitive ability, or social support system  Outcome: Progressing     Problem: Knowledge Deficit  Goal: Patient/family/caregiver demonstrates understanding of disease process, treatment plan, medications, and discharge instructions  Description: Complete learning assessment and assess knowledge base    Interventions:  - Provide teaching at level of understanding  - Provide teaching via preferred learning methods  Outcome: Progressing     Problem: NEUROSENSORY - ADULT  Goal: Achieves stable or improved neurological status  Description: INTERVENTIONS  - Monitor and report changes in neurological status  - Monitor vital signs such as temperature, blood pressure, glucose, and any other labs ordered   - Initiate measures to prevent increased intracranial pressure  - Monitor for seizure activity and implement precautions if appropriate      Outcome: Progressing     Problem: RESPIRATORY - ADULT  Goal: Achieves optimal ventilation and oxygenation  Description: INTERVENTIONS:  - Assess for changes in respiratory status  - Assess for changes in mentation and behavior  - Position to facilitate oxygenation and minimize respiratory effort  - Oxygen administered by appropriate delivery if ordered  - Initiate smoking cessation education as indicated  - Encourage broncho-pulmonary hygiene including cough, deep breathe, Incentive Spirometry  - Assess the need for suctioning and aspirate as needed  - Assess and instruct to report SOB or any respiratory difficulty  - Respiratory Therapy support as indicated  Outcome: Progressing     Problem: GENITOURINARY - ADULT  Goal: Maintains or returns to baseline urinary function  Description: INTERVENTIONS:  - Assess urinary function  - Encourage oral fluids to ensure adequate hydration if ordered  - Administer IV fluids as ordered to ensure adequate hydration  - Administer ordered medications as needed  - Offer frequent toileting  - Follow urinary retention protocol if ordered  Outcome: Progressing  Goal: Urinary catheter remains patent  Description: INTERVENTIONS:  - Assess patency of urinary catheter  - If patient has a chronic restrepo, consider changing catheter if non-functioning  - Follow guidelines for intermittent irrigation of non-functioning urinary catheter  Outcome: Progressing

## 2020-08-04 NOTE — ASSESSMENT & PLAN NOTE
· Acute respiratory failure with hypoxia secondary to legionella pneumonia    · Patient was intubated admitted to intensive care unit  · Has been titrated off any supplemental oxygen and stable on room air at time of discharge

## 2020-08-04 NOTE — ASSESSMENT & PLAN NOTE
· Legionella pneumonia thought secondary to air conditioner source  · Remains on azithromycin and Infectious Disease recommends three more days to complete 14 day course

## 2020-08-04 NOTE — SOCIAL WORK
CM increased the [place] [id number] referral for the pt  CM was also informed by Kimberlee Fong that the pt is medically ready to dc and has been successfully weaned of O2  Bc Pedroza and MARIELA discussed the pt and there will be an O/P therapy order placed for the pt  It was agreed that the pt has been able to successfully navigate walking the halls and would be able to handle O/P therapy

## 2020-08-04 NOTE — ASSESSMENT & PLAN NOTE
· Diastolic dysfunction noted on echocardiogram   Did receive furosemide during latter part of hospitalization due to lower extremity swelling  · Will not need diuretics at time of discharge

## 2020-08-04 NOTE — DISCHARGE SUMMARY
Discharge- Daylin Keating 1970, 48 y o  female MRN: [id number]  Unit/Bed#: -01 Encounter: [id number]  Primary Care Provider: Shira Blum MD   Date and time admitted to hospital: 7/24/2020  9:08 PM        Admitting Provider:  Nadege Marcelo MD  Discharge Provider:  Brittany Cannon DO  Admission Date: 7/24/2020       Discharge Date: 08/04/20   LOS: 10  Primary Care Physician at Discharge: Shira Blum, [address] [place] [address] Avenue:  Daylin Keating is a 48 y o  female who presented to the hospital with altered mental status and shortness of breath  She was admitted to intensive care unit and had prolonged hospitalization of 11+ days  For full details please see charting  Essentially she was found have legionella pneumonia and admission was intubated requiring vasopressors for septic shock  Her antibiotics were tailored to azithromycin and her air conditioners at home were changed as this may have been the source  She has been stabilized and at time of discharge does not need supplemental oxygen  Due to extensive pneumonia she should have repeat imaging in 6-8 weeks to ensure resolution    Case discussed with son at time of discharge at bedside  Please see problem list listed below  DISCHARGE DIAGNOSES  * Septic shock ([address] )  Assessment & Plan  · Patient was admitted under ICU level of care for septic shock secondary to legionella pneumonia initially requiring vasopressors  · Was being followed by infectious disease  Currently antibiotic day ##11/14 will be discharged with azithromycin    Diastolic dysfunction  Assessment & Plan  · Diastolic dysfunction noted on echocardiogram   Did receive furosemide during latter part of hospitalization due to lower extremity swelling  · Will not need diuretics at time of discharge    Encephalopathy  Assessment & Plan  · Encephalopathy secondary septic shock    Resolved at time of discharge    Legionella pneumonia [facility])  Assessment & Plan  · Legionella pneumonia thought secondary to air conditioner source  · Remains on azithromycin and Infectious Disease recommends three more days to complete 14 day course  Acute respiratory failure with hypoxia (HCC)  Assessment & Plan  · Acute respiratory failure with hypoxia secondary to legionella pneumonia  · Patient was intubated admitted to intensive care unit  · Has been titrated off any supplemental oxygen and stable on room air at time of discharge     Tobacco use  Assessment & Plan  · Tobacco use advised cessation  Hyperlipidemia  Assessment & Plan  · Hyperlipidemia continue atorvastatin    CONSULTING PROVIDERS   IP CONSULT TO PHARMACY  IP CONSULT TO CASE MANAGEMENT  IP CONSULT TO INFECTIOUS DISEASES    PROCEDURES PERFORMED  Echocardiogram  Date:  7/26/2020  SUMMARY  LEFT VENTRICLE:  Systolic function was normal by visual assessment  Ejection fraction was estimated to be 60 %  There were no regional wall motion abnormalities  Doppler parameters were consistent with abnormal left ventricular relaxation (grade 1 diastolic dysfunction)  LEFT ATRIUM:  The atrium was mildly dilated  RIGHT ATRIUM:  The atrium was mildly dilated  MITRAL VALVE:  There was mild regurgitation  Regurgitation grade was 1+ on a scale of 0 to 4+  IVC, HEPATIC VEINS:  The inferior vena cava was moderately dilated  Respirophasic changes were blunted (less than 50% variation)  RA pressure estimated at 15 mmHg  RADIOLOGY RESULTS  Ct Chest Abdomen Pelvis Wo Contrast  Result Date: 7/25/2020  Impression: Left upper lobe lobar pneumonia Workstation performed: [id number]     Xr Chest 1 View Portable  Result Date: 7/25/2020  Impression: Eft upper lobe pneumonia, better evaluated on subsequently performed CT chest  Workstation performed: [id number]     Ct Head Without Contrast  Result Date: 7/24/2020  Impression: No acute intracranial abnormality   Workstation performed: [id number]     Xr Chest Portable Icu  Result Date: 7/26/2020  Impression: Increasing left perihilar and upper lung dense consolidation  There may be minimal increased densities in the right perihilar and lower lung as well  Workstation performed: [id number]       LABS  Results from last 7 days   Lab Units 08/03/20  0505 08/01/20  0455 07/31/20  0529 07/30/20  0521 07/29/20  0628   WBC Thousand/uL 10 94* 11 71* 12 52* 12 40* 10 37*   HEMOGLOBIN g/dL 9 9* 8 7* 8 7* 9 1* 8 7*   HEMATOCRIT % 31 7* 27 5* 27 3* 28 5* 26 9*   MCV fL 95 94 92 93 93   TOTAL NEUT ABS Thousand/uL  --   --   --  8 31*  --    PLATELETS Thousands/uL 329 286 310 319 270     Results from last 7 days   Lab Units 08/04/20  0533 08/03/20  0505 08/01/20 0455 07/31/20  0529 07/30/20  2220 07/30/20  0521 07/29/20  0628   SODIUM mmol/L 139 144 144 149* 147* 152* 150*   POTASSIUM mmol/L 4 6 5 1 3 3* 3 4* 3 1* 3 5 3 7   CHLORIDE mmol/L 101 107 106 111* 110* 115* 114*   CO2 mmol/L 29 30 32 31 31 28 30   BUN mg/dL 7 6 6 6 8 11 12   CREATININE mg/dL 0 91 0 85 0 62 0 60 0 65 0 69 0 58*   CALCIUM mg/dL 9 5 8 9 7 9* 7 8* 8 2* 8 2* 7 6*   ALBUMIN g/dL  --   --  2 5*  --   --   --   --    TOTAL BILIRUBIN mg/dL  --   --  0 80  --   --   --   --    ALK PHOS U/L  --   --  128*  --   --   --   --    ALT U/L  --   --  42  --   --   --   --    AST U/L  --   --  60*  --   --   --   --    EGFR ml/min/1 73sq m 74 80 105 107 104 102 108   GLUCOSE RANDOM mg/dL 91 85 95 95 104 98 112         Results from last 7 days   Lab Units 07/30/20  0533   PROCALCITONIN ng/ml 0 36*       PHYSICAL EXAM:  Vitals:   Blood Pressure: 113/71 (08/04/20 0802)  Pulse: 97 (08/04/20 0802)  Temperature: 97 7 °F (36 5 °C) (08/04/20 0802)  Temp Source: Oral (08/04/20 0802)  Respirations: 17 (08/04/20 0802)  Height: 5' 6" (07/25/20 0720)  Weight - Scale: 69 kg (152 lb 1 9 oz) (08/03/20 0600)  SpO2: 93 % (08/04/20 0809)    General appearance: alert, appears stated age and cooperative  Eyes: conjunctivae/corneas clear  PERRL, EOM's intact    Lungs: diminished breath sounds  Heart: regular rate and rhythm  Abdomen: soft, non-tender; bowel sounds normal; no masses,  no organomegaly  Back: range of motion normal  Extremities: extremities normal, atraumatic, no cyanosis or edema  Neurologic: Grossly normal    Planned Re-admission:  No  Discharge Disposition: Home/Self Care    Test Results Pending at Discharge:  None  Incidental findings:  Pneumonia  Needs repeat imaging in 6-8 weeks to ensure resolution    Medications   · Discharge Medication List: See after visit summary for reconciled discharge medications  Diet restrictions:  Regular diet   Activity restrictions: No strenuous activity  Discharge Condition: stable    Outpatient Follow-Up and Discharge Instructions  See after visit summary section titled Discharge Instructions for information provided to patient and family  Code Status: Level 1 - Full Code  Discharge Statement   I spent 35 minutes discharging the patient  This time was spent on the day of discharge  Greater than 50% of total time was spent with the patient and / or family counseling and / or coordination of care  ** Please Note: This note has been constructed using a voice recognition system   **

## 2020-08-05 ENCOUNTER — TRANSITIONAL CARE MANAGEMENT (OUTPATIENT)
Dept: FAMILY MEDICINE CLINIC | Facility: CLINIC | Age: 50
End: 2020-08-05

## 2020-08-05 NOTE — UTILIZATION REVIEW
Notification of Discharge  This is a Notification of Discharge from our facility [address]  Please be advised that this patient has been discharge from our facility  Below you will find the admission and discharge date and time including the patients disposition  PRESENTATION DATE: 7/24/2020  9:08 PM  OBS ADMISSION DATE:   IP ADMISSION DATE: 7/25/20 0131   DISCHARGE DATE: 8/4/2020  2:07 PM  DISPOSITION: Home with [facility]   Admission Orders listed below:  Admission Orders (From admission, onward)     Ordered        07/25/20 0131  Inpatient Admission  Once                   Please contact the UR Department if additional information is required to close this patient's authorization/case  [facility] Utilization Review Department  Main: [phone] x carefully listen to the prompts  All voicemails are confidential   [email or web address]  Send all requests for admission clinical reviews, approved or denied determinations and any other requests to dedicated fax number below belonging to the campus where the patient is receiving treatment   List of dedicated fax numbers:  [address] DENIALS (Administrative/Medical Necessity) [phone]   1000  [facility] (Maternity/NICU/Pediatrics) [phone]   Angie Ballard [phone]   Bashir Delay [phone]   Cheryle Sitter [phone]   Elva Samuel [address] [phone]   [facility]  [phone]   [address] [facility]  [address] [place] [address] [facility] [phone]

## 2020-08-11 ENCOUNTER — OFFICE VISIT (OUTPATIENT)
Dept: FAMILY MEDICINE CLINIC | Facility: CLINIC | Age: 50
End: 2020-08-11
Payer: COMMERCIAL

## 2020-08-11 VITALS
TEMPERATURE: 97 F | BODY MASS INDEX: 21.69 KG/M2 | WEIGHT: 135 LBS | SYSTOLIC BLOOD PRESSURE: 124 MMHG | HEIGHT: 66 IN | OXYGEN SATURATION: 95 % | DIASTOLIC BLOOD PRESSURE: 78 MMHG | HEART RATE: 78 BPM

## 2020-08-11 DIAGNOSIS — A48.1 LEGIONELLA PNEUMONIA (HCC): ICD-10-CM

## 2020-08-11 DIAGNOSIS — D64.9 ANEMIA, UNSPECIFIED TYPE: ICD-10-CM

## 2020-08-11 DIAGNOSIS — E78.5 HYPERLIPIDEMIA, UNSPECIFIED HYPERLIPIDEMIA TYPE: ICD-10-CM

## 2020-08-11 DIAGNOSIS — G93.40 ENCEPHALOPATHY: ICD-10-CM

## 2020-08-11 DIAGNOSIS — R65.21 SEPTIC SHOCK (HCC): Primary | ICD-10-CM

## 2020-08-11 DIAGNOSIS — G47.00 INSOMNIA, UNSPECIFIED TYPE: ICD-10-CM

## 2020-08-11 DIAGNOSIS — J96.01 ACUTE RESPIRATORY FAILURE WITH HYPOXIA (HCC): ICD-10-CM

## 2020-08-11 DIAGNOSIS — I51.89 DIASTOLIC DYSFUNCTION: ICD-10-CM

## 2020-08-11 DIAGNOSIS — K59.00 CONSTIPATION, UNSPECIFIED CONSTIPATION TYPE: ICD-10-CM

## 2020-08-11 DIAGNOSIS — A41.9 SEPTIC SHOCK (HCC): Primary | ICD-10-CM

## 2020-08-11 PROBLEM — Z72.0 TOBACCO USE: Status: RESOLVED | Noted: 2018-11-08 | Resolved: 2020-08-11

## 2020-08-11 PROCEDURE — 99496 TRANSJ CARE MGMT HIGH F2F 7D: CPT | Performed by: FAMILY MEDICINE

## 2020-08-11 PROCEDURE — 3008F BODY MASS INDEX DOCD: CPT | Performed by: FAMILY MEDICINE

## 2020-08-11 PROCEDURE — 1111F DSCHRG MED/CURRENT MED MERGE: CPT | Performed by: FAMILY MEDICINE

## 2020-08-11 NOTE — PROGRESS NOTES
Tobacco Cessation Counseling: Tobacco cessation counseling was provided  The patient is sincerely urged to quit consumption of tobacco  She is not ready to quit tobacco    Assessment/Plan:         Problem List Items Addressed This Visit        Respiratory    Acute respiratory failure with hypoxia (HCC)    Legionella pneumonia ([facility] [address] )    Relevant Orders    XR chest pa & lateral       Nervous and Auditory    Encephalopathy       Other    Insomnia     Continue Ambien 12 5 mg q h s  Hyperlipidemia     Continue Lipitor 10 mg daily         Septic shock (HCC) - Primary    Diastolic dysfunction    Anemia    Relevant Orders    Iron    CBC and differential    Constipation     I recommended that she try MiraLax and get colonoscopy since she is now 48  Subjective:      Patient ID: Melanie Sahu is a 48 y o  female  Patient comes in for hospital follow-up  She had hypoxia, acute septic shock and encephalopathy from Legionella pneumonia  She was in the intensive care unit and intubated  She became anemic while in the hospital which improved during her stay but did not return to normal   Hold complaint today is that of constipation  She has quit smoking  The following portions of the patient's history were reviewed and updated as appropriate:   She has no past medical history on file  ,  does not have any pertinent problems on file  ,   has a past surgical history that includes Cervical biopsy w/ loop electrode excision  ,  family history includes Colon cancer in her maternal grandfather  ,   reports that she has been smoking cigarettes  She has never used smokeless tobacco  She reports previous alcohol use  She reports that she does not use drugs  ,  is allergic to amoxil [amoxicillin]     Current Outpatient Medications   Medication Sig Dispense Refill    atorvastatin (LIPITOR) 10 mg tablet take 1 tablet by mouth once daily 30 tablet 5    zolpidem (AMBIEN CR) 12 5 MG CR tablet Take 1 tablet (12 5 mg total) by mouth daily at bedtime as needed for sleep 30 tablet 5    ondansetron (ZOFRAN-ODT) 4 mg disintegrating tablet Take 1 tablet (4 mg total) by mouth every 8 (eight) hours as needed for nausea (Patient not taking: Reported on 8/11/2020) 15 tablet 0     No current facility-administered medications for this visit  Review of Systems   Constitutional: Negative  HENT: Negative  Respiratory: Negative  Cardiovascular: Negative  Gastrointestinal: Positive for constipation  Objective:  Vitals:    08/11/20 1123   BP: 124/78   Pulse: 78   Temp: (!) 97 °F (36 1 °C)   TempSrc: Tympanic   SpO2: 95%   Weight: 61 2 kg (135 lb)   Height: 5' 6" (1 676 m)     Body mass index is 21 79 kg/m²  Physical Exam  Constitutional:       Appearance: Normal appearance  She is well-developed  HENT:      Head: Normocephalic and atraumatic  Right Ear: Tympanic membrane and external ear normal       Left Ear: Tympanic membrane and external ear normal    Eyes:      Pupils: Pupils are equal, round, and reactive to light  Neck:      Musculoskeletal: Neck supple  Thyroid: No thyromegaly  Cardiovascular:      Rate and Rhythm: Normal rate and regular rhythm  Heart sounds: Normal heart sounds  Pulmonary:      Effort: Pulmonary effort is normal       Breath sounds: Normal breath sounds  Abdominal:      Palpations: Abdomen is soft  Musculoskeletal: Normal range of motion  Lymphadenopathy:      Cervical: No cervical adenopathy  Skin:     General: Skin is warm and dry  Neurological:      Mental Status: She is alert and oriented to person, place, and time

## 2020-08-18 ENCOUNTER — HOSPITAL ENCOUNTER (OUTPATIENT)
Dept: RADIOLOGY | Facility: HOSPITAL | Age: 50
Discharge: HOME | End: 2020-08-18
Attending: FAMILY MEDICINE
Payer: COMMERCIAL

## 2020-08-18 ENCOUNTER — APPOINTMENT (OUTPATIENT)
Dept: LAB | Facility: HOSPITAL | Age: 50
End: 2020-08-18
Attending: FAMILY MEDICINE
Payer: COMMERCIAL

## 2020-08-18 DIAGNOSIS — A48.1 LEGIONELLA PNEUMONIA (HCC): ICD-10-CM

## 2020-08-18 DIAGNOSIS — D64.9 ANEMIA, UNSPECIFIED TYPE: ICD-10-CM

## 2020-08-18 DIAGNOSIS — E78.5 HYPERLIPIDEMIA, UNSPECIFIED HYPERLIPIDEMIA TYPE: ICD-10-CM

## 2020-08-18 LAB
ALBUMIN SERPL BCP-MCNC: 3.1 G/DL (ref 3.5–5)
ALP SERPL-CCNC: 128 U/L (ref 46–116)
ALT SERPL W P-5'-P-CCNC: 25 U/L (ref 12–78)
ANION GAP SERPL CALCULATED.3IONS-SCNC: 8 MMOL/L (ref 4–13)
AST SERPL W P-5'-P-CCNC: 15 U/L (ref 5–45)
BASOPHILS # BLD AUTO: 0.07 THOUSANDS/ΜL (ref 0–0.1)
BASOPHILS NFR BLD AUTO: 1 % (ref 0–1)
BILIRUB SERPL-MCNC: 0.4 MG/DL (ref 0.2–1)
BUN SERPL-MCNC: 7 MG/DL (ref 5–25)
CALCIUM SERPL-MCNC: 9.2 MG/DL (ref 8.3–10.1)
CHLORIDE SERPL-SCNC: 106 MMOL/L (ref 100–108)
CHOLEST SERPL-MCNC: 152 MG/DL (ref 50–200)
CO2 SERPL-SCNC: 29 MMOL/L (ref 21–32)
CREAT SERPL-MCNC: 0.72 MG/DL (ref 0.6–1.3)
EOSINOPHIL # BLD AUTO: 0.43 THOUSAND/ΜL (ref 0–0.61)
EOSINOPHIL NFR BLD AUTO: 4 % (ref 0–6)
ERYTHROCYTE [DISTWIDTH] IN BLOOD BY AUTOMATED COUNT: 15.1 % (ref 11.6–15.1)
GFR SERPL CREATININE-BSD FRML MDRD: 98 ML/MIN/1.73SQ M
GLUCOSE P FAST SERPL-MCNC: 91 MG/DL (ref 65–99)
HCT VFR BLD AUTO: 34.6 % (ref 34.8–46.1)
HDLC SERPL-MCNC: 46 MG/DL
HGB BLD-MCNC: 10.5 G/DL (ref 11.5–15.4)
IMM GRANULOCYTES # BLD AUTO: 0.09 THOUSAND/UL (ref 0–0.2)
IMM GRANULOCYTES NFR BLD AUTO: 1 % (ref 0–2)
IRON SERPL-MCNC: 45 UG/DL (ref 50–170)
LDLC SERPL CALC-MCNC: 82 MG/DL (ref 0–100)
LYMPHOCYTES # BLD AUTO: 4.11 THOUSANDS/ΜL (ref 0.6–4.47)
LYMPHOCYTES NFR BLD AUTO: 34 % (ref 14–44)
MCH RBC QN AUTO: 28.3 PG (ref 26.8–34.3)
MCHC RBC AUTO-ENTMCNC: 30.3 G/DL (ref 31.4–37.4)
MCV RBC AUTO: 93 FL (ref 82–98)
MONOCYTES # BLD AUTO: 0.84 THOUSAND/ΜL (ref 0.17–1.22)
MONOCYTES NFR BLD AUTO: 7 % (ref 4–12)
NEUTROPHILS # BLD AUTO: 6.59 THOUSANDS/ΜL (ref 1.85–7.62)
NEUTS SEG NFR BLD AUTO: 53 % (ref 43–75)
NONHDLC SERPL-MCNC: 106 MG/DL
NRBC BLD AUTO-RTO: 0 /100 WBCS
PLATELET # BLD AUTO: 495 THOUSANDS/UL (ref 149–390)
PMV BLD AUTO: 9.9 FL (ref 8.9–12.7)
POTASSIUM SERPL-SCNC: 4.3 MMOL/L (ref 3.5–5.3)
PROT SERPL-MCNC: 7.3 G/DL (ref 6.4–8.2)
RBC # BLD AUTO: 3.71 MILLION/UL (ref 3.81–5.12)
SODIUM SERPL-SCNC: 143 MMOL/L (ref 136–145)
TRIGL SERPL-MCNC: 121 MG/DL
WBC # BLD AUTO: 12.13 THOUSAND/UL (ref 4.31–10.16)

## 2020-08-18 PROCEDURE — 85025 COMPLETE CBC W/AUTO DIFF WBC: CPT

## 2020-08-18 PROCEDURE — 80061 LIPID PANEL: CPT

## 2020-08-18 PROCEDURE — 83540 ASSAY OF IRON: CPT

## 2020-08-18 PROCEDURE — 71046 X-RAY EXAM CHEST 2 VIEWS: CPT

## 2020-08-18 PROCEDURE — 80053 COMPREHEN METABOLIC PANEL: CPT

## 2020-08-18 PROCEDURE — 36415 COLL VENOUS BLD VENIPUNCTURE: CPT

## 2020-08-19 ENCOUNTER — TELEPHONE (OUTPATIENT)
Dept: FAMILY MEDICINE CLINIC | Facility: CLINIC | Age: 50
End: 2020-08-19

## 2020-08-21 DIAGNOSIS — G47.00 INSOMNIA, UNSPECIFIED TYPE: ICD-10-CM

## 2020-08-22 NOTE — TELEPHONE ENCOUNTER
Medication:  PDMP   07/08/2020  1  02/03/2020  ZOLPIDEM TART ER 12 5 MG TAB  30 0  30  BR ANGELES  [id number]  [facility] (0097)  5   Comm Ins  [place]    06/06/2020  1  02/03/2020  ZOLPIDEM TART ER 12 5 MG TAB  30 0  30  BR ANGELES  [id number]  [facility] (0097)  4   Comm Ins  [place]    05/06/2020  1  02/03/2020  ZOLPIDEM TART ER 12 5 MG TAB  30 0  30  BR ANGELES  [id number]  [facility] (0097)  3   Comm Ins  [place]    04/04/2020  2  02/03/2020  ZOLPIDEM TART ER 12 5 MG TAB  30 0  30  BR ANGELES  [id number]  [facility] (0097)  2   Comm Ins  [place]    03/04/2020  2  02/03/2020  ZOLPIDEM TART ER 12 5 MG TAB  30 0  30  BR ANGELES  [id number]  [facility] (0097)  1   Comm         Active agreement on file -No

## 2020-08-23 RX ORDER — ZOLPIDEM TARTRATE 12.5 MG/1
TABLET, FILM COATED, EXTENDED RELEASE ORAL
Qty: 30 TABLET | Refills: 3 | Status: SHIPPED | OUTPATIENT
Start: 2020-08-23 | End: 2020-09-14 | Stop reason: HOSPADM

## 2020-09-01 NOTE — ED PROVIDER NOTES
History  Chief Complaint   Patient presents with    Altered Mental Status     confusion and difficulty ambulating     Shortness of Breath     SOB, audible respirations      80-year-old female presenting to emergency department for evaluation of altered mental status  Per , she has been confused and has significant difficulty walking  She has a very short of breath today as well  Had subjective fevers and sweats  Some cough as well  He had patient's very contused currently able to provide limited history  Prior to Admission Medications   Prescriptions Last Dose Informant Patient Reported? Taking?   atorvastatin (LIPITOR) 10 mg tablet Past Week at Unknown time  No Yes   Sig: take 1 tablet by mouth once daily   ondansetron (ZOFRAN-ODT) 4 mg disintegrating tablet Past Week at Unknown time  No Yes   Sig: Take 1 tablet (4 mg total) by mouth every 8 (eight) hours as needed for nausea   Patient not taking: Reported on 8/11/2020      Facility-Administered Medications: None       No past medical history on file  Past Surgical History:   Procedure Laterality Date    CERVICAL BIOPSY  W/ LOOP ELECTRODE EXCISION         Family History   Problem Relation Age of Onset    Colon cancer Maternal Grandfather      I have reviewed and agree with the history as documented  E-Cigarette/Vaping     E-Cigarette/Vaping Substances     Social History     Tobacco Use    Smoking status: Current Every Day Smoker     Types: Cigarettes    Smokeless tobacco: Never Used   Substance Use Topics    Alcohol use: Not Currently     Frequency: 4 or more times a week     Comment: "was drinking daily, stopped saturday"    Drug use: No       Review of Systems   Unable to perform ROS: Mental status change       Physical Exam  Physical Exam  Vitals signs and nursing note reviewed  Constitutional:       General: She is in acute distress  Appearance: She is well-developed and well-nourished  She is diaphoretic     HENT: Head: Normocephalic and atraumatic  Mouth/Throat:      Mouth: Oropharynx is clear and moist    Eyes:      Extraocular Movements: EOM normal       Pupils: Pupils are equal, round, and reactive to light  Neck:      Vascular: No JVD  Trachea: No tracheal deviation  Cardiovascular:      Rate and Rhythm: Normal rate and regular rhythm  Pulses: Intact distal pulses  Heart sounds: Normal heart sounds  No murmur  No friction rub  No gallop  Pulmonary:      Effort: Accessory muscle usage and respiratory distress present  Breath sounds: Wheezing present  No rales  Abdominal:      General: Bowel sounds are normal  There is no distension  Palpations: Abdomen is soft  Tenderness: There is no abdominal tenderness  There is no guarding or rebound  Skin:     General: Skin is warm  Coloration: Skin is not pale  Neurological:      Mental Status: She is alert and oriented to person, place, and time  Cranial Nerves: No cranial nerve deficit  Motor: No abnormal muscle tone     Psychiatric:         Mood and Affect: Mood and affect normal          Behavior: Behavior normal          Vital Signs  ED Triage Vitals   Temperature Pulse Respirations Blood Pressure SpO2   07/24/20 2107 07/24/20 2107 07/24/20 2107 07/24/20 2107 07/24/20 2107   (!) 102 6 °F (39 2 °C) (!) 146 (!) 27 108/77 (!) 88 %      Temp Source Heart Rate Source Patient Position - Orthostatic VS BP Location FiO2 (%)   07/24/20 2107 07/24/20 2107 07/24/20 2107 07/24/20 2107 07/25/20 0135   Oral Monitor Sitting Left arm 100      Pain Score       07/25/20 0221       Med Not Given for Pain - for MAR use only           Vitals:    08/03/20 0816 08/03/20 1511 08/03/20 2158 08/04/20 0802   BP: 109/69 118/68 107/67 113/71   Pulse: 99 105 94 97   Patient Position - Orthostatic VS:   Lying Lying         Visual Acuity  Visual Acuity      Most Recent Value   L Pupil Size (mm)  2   R Pupil Size (mm)  2   L Pupil Shape  Round   R Pupil Shape  Round          ED Medications  Medications   sodium chloride 0 9 % bolus 1,000 mL (0 mL Intravenous Stopped 7/24/20 2312)   vancomycin (VANCOCIN) IVPB (premix) 1,000 mg 200 mL (0 mg/kg × 70 8 kg Intravenous Stopped 7/24/20 2223)   cefepime (MAXIPIME) 2,000 mg in dextrose 5 % 50 mL IVPB (0 mg Intravenous Stopped 7/24/20 2216)   albuterol inhalation solution 5 mg (5 mg Nebulization Given 7/24/20 2118)   ipratropium (ATROVENT) 0 02 % inhalation solution 0 5 mg (0 5 mg Nebulization Given 7/24/20 2118)   sodium chloride 0 9 % bolus 1,000 mL (0 mL Intravenous Stopped 7/25/20 0838)   potassium chloride (K-DUR,KLOR-CON) CR tablet 40 mEq (40 mEq Oral Given 7/24/20 2246)   potassium chloride 20 mEq IVPB (premix) (0 mEq Intravenous Stopped 7/25/20 0045)   acetaminophen (TYLENOL) tablet 650 mg (650 mg Oral Given 7/24/20 2251)   fentanyl citrate (PF) 100 MCG/2ML 50 mcg (50 mcg Intravenous Given 7/25/20 0221)   potassium chloride 10 % oral solution 40 mEq (40 mEq Oral Given 7/25/20 0843)   potassium chloride 20 mEq IVPB (premix) (0 mEq Intravenous Stopped 7/25/20 0930)   magnesium sulfate 2 g/50 mL IVPB (premix) 2 g (0 g Intravenous Stopped 7/25/20 1046)   calcium gluconate 2 g in sodium chloride 0 9% 100 mL (premix) (0 g Intravenous Stopped 7/25/20 1000)   albumin human (FLEXBUMIN) 5 % injection 25 g (0 g Intravenous Stopped 7/25/20 0910)   calcium gluconate 2 g in sodium chloride 0 9% 100 mL (premix) (0 g Intravenous Stopped 7/25/20 1600)   multi-electrolyte (ISOLYTE-S PH 7 4) bolus 1,000 mL (0 mL Intravenous Stopped 7/25/20 1722)   calcium gluconate 1 g in sodium chloride 0 9% 50 mL (premix) (0 g Intravenous Stopped 7/26/20 0734)   potassium chloride 20 mEq IVPB (premix) (20 mEq Intravenous New Bag 7/27/20 0928)   potassium chloride (K-DUR,KLOR-CON) CR tablet 40 mEq (40 mEq Oral Given 7/28/20 1424)   potassium chloride (K-DUR,KLOR-CON) CR tablet 40 mEq (40 mEq Oral Given 7/29/20 0823)   calcium gluconate 2 g in sodium chloride 0 9% 100 mL (premix) (2 g Intravenous New Bag 7/29/20 0824)   potassium chloride 20 mEq IVPB (premix) (0 mEq Intravenous Stopped 8/1/20 0100)   magnesium sulfate IVPB (premix) SOLN 1 g (1 g Intravenous New Bag 8/2/20 1221)       Diagnostic Studies  Results Reviewed     Procedure Component Value Units Date/Time    Blood culture #2 [[id number]] Collected:  07/24/20 2118    Lab Status:  Final result Specimen:  Blood from Arm, Left Updated:  07/30/20 1201     Blood Culture No Growth After 5 Days  Blood culture #1 [[id number]]  (Abnormal)  (Susceptibility) Collected:  07/24/20 2118    Lab Status:  Final result Specimen:  Blood from Arm, Right Updated:  07/30/20 2777     Blood Culture Micrococcus luteus     Gram Stain Result Gram positive cocci in clusters    Narrative:       Susceptibility testing will not be performed as this organism, when isolated from a single set of blood cultures, represents probable skin otto contamination  Please call the Microbiology Laboratory within 5 days at [phone] if further workup is required      Susceptibility     Micrococcus luteus (1)     Antibiotic Interpretation Microscan Method Status    ZID Performed  Yes JJ Final                   MRSA culture [[id number]]  (Normal) Collected:  07/25/20 0238    Lab Status:  Final result Specimen:  Nares from Nose Updated:  07/26/20 1243     MRSA Culture Only No Methicillin Resistant Staphlyococcus aureus (MRSA) isolated    Procalcitonin [[id number]]  (Abnormal) Collected:  07/24/20 2118    Lab Status:  Final result Specimen:  Blood from Arm, Left Updated:  07/25/20 1306     Procalcitonin 6 76 ng/ml     Legionella antigen, urine [[id number]]  (Abnormal) Collected:  07/25/20 0237    Lab Status:  Final result Specimen:  Urine, Catheter Updated:  07/25/20 1245     Legionella Urinary Antigen Positive    Strep Pneumoniae, Urine [[id number]]  (Normal) Collected:  07/25/20 0238    Lab Status:  Final result Specimen:  Urine, Catheter Updated:  07/25/20 1242     Strep pneumoniae antigen, urine Negative    Platelet count [[id number]]  (Abnormal) Collected:  07/25/20 0250    Lab Status:  Final result Specimen:  Blood from Line, Arterial Updated:  07/25/20 0329     Platelets 98 Thousands/uL      MPV 12 6 fL     Lactic acid, plasma [[id number]]  (Abnormal) Collected:  07/25/20 0250    Lab Status:  Final result Specimen:  Blood from Line, Arterial Updated:  07/25/20 0324     LACTIC ACID 2 2 mmol/L     Narrative:       Result may be elevated if tourniquet was used during collection      Blood gas, arterial [[id number]]  (Abnormal) Collected:  07/25/20 0250    Lab Status:  Final result Specimen:  Blood, Arterial from Line, Arterial Updated:  07/25/20 0258     pH, Arterial 7 339     pCO2, Arterial 35 7 mm Hg      pO2, Arterial 363 0 mm Hg      HCO3, Arterial 18 8 mmol/L      Base Excess, Arterial -6 3 mmol/L      O2 Content, Arterial 15 0 mL/dL      O2 HGB,Arterial  98 8 %      SOURCE Line, Arterial     Temperature 97 3 Degrees Fehrenheit      Vent Type- AC AC     AC Rate 16     Tidal Volume 350 ml      Inspired Air (FIO2) 100     PEEP 10    Urine Microscopic [[id number]]  (Abnormal) Collected:  07/25/20 0200    Lab Status:  Final result Specimen:  Urine, Other Updated:  07/25/20 0216     RBC, UA 2-4 /hpf      WBC, UA 1-2 /hpf      Epithelial Cells Occasional /hpf      Bacteria, UA Occasional /hpf      Fine granular casts 3-5 /lpf      COARSE GRANULAR CASTS 5-10 /lpf     UA w Reflex to Microscopic w Reflex to Culture [[id number]]  (Abnormal) Collected:  07/25/20 0200    Lab Status:  Final result Specimen:  Urine, Other Updated:  07/25/20 0212     Color, UA Cecilia     Clarity, UA Cloudy     Specific Gravity, UA 1 025     pH, UA 5 5     Leukocytes, UA Negative     Nitrite, UA Negative     Protein, UA >=300 mg/dl      Glucose,  (1/10%) mg/dl      Ketones, UA Trace mg/dl      Urobilinogen, UA 0 2 E U /dl      Bilirubin, UA Small     Blood, UA Large    Blood gas, arterial [[id number]]  (Abnormal) Collected:  07/25/20 0102    Lab Status:  Final result Specimen:  Blood, Arterial from Radial, Right Updated:  07/25/20 0110     pH, Arterial 7 456     pCO2, Arterial 31 0 mm Hg      pO2, Arterial 80 8 mm Hg      HCO3, Arterial 21 4 mmol/L      Base Excess, Arterial -1 8 mmol/L      O2 Content, Arterial 14 4 mL/dL      O2 HGB,Arterial  94 8 %      SOURCE Radial, Right     BRANDY TEST Yes     Nasal Cannula 3    Lactic acid 2 Hours [[id number]]  (Abnormal) Collected:  07/24/20 2311    Lab Status:  Final result Specimen:  Blood from Arm, Left Updated:  07/24/20 2352     LACTIC ACID 2 5 mmol/L     Narrative:       Result may be elevated if tourniquet was used during collection  Novel Coronavirus Dao POLANCO [facility] [[id number]]  (Normal) Collected:  07/24/20 2127    Lab Status:  Final result Specimen:  Nares from Nose Updated:  07/24/20 2230     SARS-CoV-2 Negative    Narrative: The specimen collection materials, transport medium, and/or testing methodology utilized in the production of these test results have been proven to be reliable in a limited validation with an abbreviated program under the Emergency Utilization Authorization provided by the FDA  Testing reported as "Presumptive positive" will be confirmed with secondary testing with a reference laboratory to ensure result accuracy  Clinical caution and judgement should be used with the interpretation of these results with consideration of the clinical impression and other laboratory testing  Testing reported as "Positive" or "Negative" has been proven to be accurate according to standard laboratory validation requirements  All testing is performed with control materials showing appropriate reactivity at standard intervals        Protime-INR [[id number]]  (Abnormal) Collected:  07/24/20 2118    Lab Status:  Final result Specimen:  Blood from Arm, Left Updated:  07/24/20 2217     Protime 15 3 seconds      INR 1 19 APTT [[id number]]  (Abnormal) Collected:  07/24/20 2118    Lab Status:  Final result Specimen:  Blood from Arm, Left Updated:  07/24/20 2217     PTT 45 seconds     CBC and differential [[id number]]  (Abnormal) Collected:  07/24/20 2118    Lab Status:  Final result Specimen:  Blood from Arm, Left Updated:  07/24/20 2209     WBC 14 33 Thousand/uL      RBC 4 12 Million/uL      Hemoglobin 12 3 g/dL      Hematocrit 36 6 %      MCV 89 fL      MCH 29 9 pg      MCHC 33 6 g/dL      RDW 16 5 %      MPV 13 2 fL      Platelets 570 Thousands/uL      nRBC 0 /100 WBCs     Lactic Acid [[id number]]  (Abnormal) Collected:  07/24/20 2118    Lab Status:  Final result Specimen:  Blood from Arm, Left Updated:  07/24/20 2201     LACTIC ACID 3 0 mmol/L     Narrative:       Result may be elevated if tourniquet was used during collection      Comprehensive metabolic panel [[id number]]  (Abnormal) Collected:  07/24/20 2118    Lab Status:  Final result Specimen:  Blood from Arm, Left Updated:  07/24/20 2200     Sodium 138 mmol/L      Potassium 2 5 mmol/L      Chloride 98 mmol/L      CO2 27 mmol/L      ANION GAP 13 mmol/L      BUN 15 mg/dL      Creatinine 1 54 mg/dL      Glucose 128 mg/dL      Calcium 8 1 mg/dL       U/L      ALT 25 U/L      Alkaline Phosphatase 83 U/L      Total Protein 6 8 g/dL      Albumin 2 2 g/dL      Total Bilirubin 0 80 mg/dL      eGFR 39 ml/min/1 73sq m     Narrative:       Felicity guidelines for Chronic Kidney Disease (CKD):     Stage 1 with normal or high GFR (GFR > 90 mL/min/1 73 square meters)    Stage 2 Mild CKD (GFR = 60-89 mL/min/1 73 square meters)    Stage 3A Moderate CKD (GFR = 45-59 mL/min/1 73 square meters)    Stage 3B Moderate CKD (GFR = 30-44 mL/min/1 73 square meters)    Stage 4 Severe CKD (GFR = 15-29 mL/min/1 73 square meters)    Stage 5 End Stage CKD (GFR <15 mL/min/1 73 square meters)  Note: GFR calculation is accurate only with a steady state creatinine XR chest portable ICU   Final Result by Shira Eng MD (07/26 5439)      Increasing left perihilar and upper lung dense consolidation  There may be minimal increased densities in the right perihilar and lower lung as well  Workstation performed: [id number]         CT chest abdomen pelvis wo contrast   Final Result by Quinten Roman MD (07/25 0221)      Left upper lobe lobar pneumonia                  Workstation performed: [id number]         CT head without contrast   Final Result by Quinten Roman MD (07/24 8774)      No acute intracranial abnormality  Workstation performed: [id number]         XR chest 1 view portable   Final Result by Quinten Roman MD (07/25 5313)      Eft upper lobe pneumonia, better evaluated on subsequently performed CT chest             Workstation performed: [id number]                    Procedures  Intubation    Date/Time: 7/24/2020 10:00 PM  Performed by: Joe Steel MD  Authorized by: Joe Steel MD     Consent:     Consent obtained:  Verbal    Consent given by:  Spouse    Risks discussed:  Aspiration, bleeding, brain injury, death, dental trauma, hypoxia and laryngeal injury  Universal protocol:     Patient identity confirmed:  Verbally with patient  Pre-procedure details:     Patient status:  Altered mental status    Mallampati score:  2    Pretreatment medications:  Etomidate    Paralytics:  Succinylcholine  Indications:     Indications for intubation: respiratory distress and airway protection    Procedure details:     Preoxygenation:  Nasal cannula    CPR in progress: no      Intubation method:  Oral    Laryngoscope blade: Mac 4    Tube size (mm):  7 5    Tube type:  Cuffed    Number of attempts:  1  Placement assessment:     Tube secured with:   Adhesive tape    Breath sounds:  Equal    Placement verification: chest rise and CXR verification      CXR findings:  ETT in proper place  Post-procedure details:     Patient tolerance of procedure: Tolerated well, no immediate complications             ED Course                                             MDM  Number of Diagnoses or Management Options  Acute respiratory failure with [facility] ([facility] ): Altered mental status:   Pneumonia:   Sepsis [facility]):   Diagnosis management comments: 27-year-old female with altered mental status and shortness of breath, suspect possible infection/sepsis, will check labs EKG here, chest x-ray, had critical care evaluate  Critical care evaluated and because of the increased work of breathing, recommended intubation due to both increased work of breathing and altered mental status for airway protection  Patient was semi electively intubated down here in the emergency department by myself prior to transport up to the ICU        Disposition  Final diagnoses: Altered mental status   Pneumonia   Sepsis ([facility] )   Acute respiratory failure with hypoxia ([facility] )     Time reflects when diagnosis was documented in both MDM as applicable and the Disposition within this note     Time User Action Codes Description Comment    7/25/2020  1:29 AM Jazmyne Chavarria Add [R41 82] Altered mental status     7/25/2020  1:29 AM Nigel Chavarria Add [J18 9] Pneumonia     7/25/2020  1:29 AM Nigel Chavarria Add [A41 9] Sepsis ([facility] )     7/25/2020  3:00 AM Geni Aaron Add [R41 82] Altered mental status, unspecified altered mental status type     7/25/2020  5:51 AM Romy Jain Add [J96 01] Acute respiratory failure with hypoxia ([facility] )     7/25/2020  3:18 PM Stacey Courts [A48 1] Legionella pneumonia ([place] [address] )     7/28/2020 10:22 AM Luz Scale Add [R73 9] Hyperglycemia     7/28/2020 10:28 AM Luz Scale Add [E11 9] Diabetes mellitus, new onset [facility])       ED Disposition     ED Disposition Condition Date/Time Comment    Admit Stable Sat Jul 25, 2020  1:29 AM Case was discussed with CCM and the patient's admission status was agreed to be Admission Status: inpatient status to the service of Dr Thomas Seen  Follow-up Information     Follow up With Specialties Details Why Contact Info Additional [phone] [facility]  Follow up Brent Gottron D/C SUMMARY [phone] [facility]     Lobito Carrasco MD Family Medicine Schedule an appointment as soon as possible for a visit in 1 week(s)  [facility]  [address]  [address]       Physical Therapy at [place] [address] Schedule an appointment as soon as possible for a visit in 1 week(s)  [facility] [address] [facility], [facility], [address] [place], [place], [address] [facility]          Discharge Medication List as of 8/4/2020  1:52 PM      START taking these medications    Details   azithromycin (ZITHROMAX) 500 MG tablet Take 1 tablet (500 mg total) by mouth every 24 hours for 3 doses, Starting Wed 8/5/2020, Until Sat 8/8/2020, Normal         CONTINUE these medications which have NOT CHANGED    Details   atorvastatin (LIPITOR) 10 mg tablet take 1 tablet by mouth once daily, Normal      ondansetron (ZOFRAN-ODT) 4 mg disintegrating tablet Take 1 tablet (4 mg total) by mouth every 8 (eight) hours as needed for nausea, Starting Tue 7/21/2020, Print      zolpidem (AMBIEN CR) 12 5 MG CR tablet Take 1 tablet (12 5 mg total) by mouth daily at bedtime as needed for sleep, Starting Mon 2/3/2020, Normal               PDMP Review     None          ED Provider  Electronically Signed by           Nadja Henry MD  09/01/20 4923

## 2020-09-09 ENCOUNTER — TELEPHONE (OUTPATIENT)
Dept: FAMILY MEDICINE CLINIC | Facility: CLINIC | Age: 50
End: 2020-09-09

## 2020-09-09 NOTE — TELEPHONE ENCOUNTER
Pt called is experiencing rectal pain try to use the bathroom she is taking Advil and it is not helping pt said that she cant sit    Please advise  [phone]

## 2020-09-10 ENCOUNTER — TELEPHONE (OUTPATIENT)
Dept: FAMILY MEDICINE CLINIC | Facility: CLINIC | Age: 50
End: 2020-09-10

## 2020-09-10 ENCOUNTER — HOSPITAL ENCOUNTER (INPATIENT)
Facility: HOSPITAL | Age: 50
LOS: 2 days | Discharge: HOME | DRG: 392 | End: 2020-09-14
Attending: EMERGENCY MEDICINE | Admitting: FAMILY MEDICINE
Payer: COMMERCIAL

## 2020-09-10 ENCOUNTER — APPOINTMENT (EMERGENCY)
Dept: CT IMAGING | Facility: HOSPITAL | Age: 50
DRG: 392 | End: 2020-09-10
Payer: COMMERCIAL

## 2020-09-10 DIAGNOSIS — E87.6 HYPOKALEMIA: Primary | ICD-10-CM

## 2020-09-10 DIAGNOSIS — R19.7 DIARRHEA, UNSPECIFIED TYPE: ICD-10-CM

## 2020-09-10 DIAGNOSIS — K52.9 COLITIS: ICD-10-CM

## 2020-09-10 DIAGNOSIS — R10.84 GENERALIZED ABDOMINAL PAIN: ICD-10-CM

## 2020-09-10 PROBLEM — F17.200 SMOKER: Status: ACTIVE | Noted: 2018-11-08

## 2020-09-10 LAB
ALBUMIN SERPL BCP-MCNC: 3 G/DL (ref 3.5–5)
ALP SERPL-CCNC: 133 U/L (ref 46–116)
ALT SERPL W P-5'-P-CCNC: 15 U/L (ref 12–78)
ANION GAP SERPL CALCULATED.3IONS-SCNC: 9 MMOL/L (ref 4–13)
AST SERPL W P-5'-P-CCNC: 9 U/L (ref 5–45)
ATRIAL RATE: 78 BPM
BASOPHILS # BLD AUTO: 0.05 THOUSANDS/ΜL (ref 0–0.1)
BASOPHILS NFR BLD AUTO: 0 % (ref 0–1)
BILIRUB SERPL-MCNC: 0.6 MG/DL (ref 0.2–1)
BUN SERPL-MCNC: 18 MG/DL (ref 5–25)
CALCIUM SERPL-MCNC: 9 MG/DL (ref 8.3–10.1)
CHLORIDE SERPL-SCNC: 105 MMOL/L (ref 100–108)
CO2 SERPL-SCNC: 30 MMOL/L (ref 21–32)
CREAT SERPL-MCNC: 0.77 MG/DL (ref 0.6–1.3)
EOSINOPHIL # BLD AUTO: 0.07 THOUSAND/ΜL (ref 0–0.61)
EOSINOPHIL NFR BLD AUTO: 1 % (ref 0–6)
ERYTHROCYTE [DISTWIDTH] IN BLOOD BY AUTOMATED COUNT: 15 % (ref 11.6–15.1)
GFR SERPL CREATININE-BSD FRML MDRD: 90 ML/MIN/1.73SQ M
GLUCOSE SERPL-MCNC: 101 MG/DL (ref 65–140)
HCT VFR BLD AUTO: 35.2 % (ref 34.8–46.1)
HGB BLD-MCNC: 11.1 G/DL (ref 11.5–15.4)
IMM GRANULOCYTES # BLD AUTO: 0.04 THOUSAND/UL (ref 0–0.2)
IMM GRANULOCYTES NFR BLD AUTO: 0 % (ref 0–2)
LACTATE SERPL-SCNC: 1.1 MMOL/L (ref 0.5–2)
LIPASE SERPL-CCNC: 97 U/L (ref 73–393)
LYMPHOCYTES # BLD AUTO: 3.05 THOUSANDS/ΜL (ref 0.6–4.47)
LYMPHOCYTES NFR BLD AUTO: 26 % (ref 14–44)
MAGNESIUM SERPL-MCNC: 1.9 MG/DL (ref 1.6–2.6)
MCH RBC QN AUTO: 27.8 PG (ref 26.8–34.3)
MCHC RBC AUTO-ENTMCNC: 31.5 G/DL (ref 31.4–37.4)
MCV RBC AUTO: 88 FL (ref 82–98)
MONOCYTES # BLD AUTO: 1.36 THOUSAND/ΜL (ref 0.17–1.22)
MONOCYTES NFR BLD AUTO: 12 % (ref 4–12)
NEUTROPHILS # BLD AUTO: 7.1 THOUSANDS/ΜL (ref 1.85–7.62)
NEUTS SEG NFR BLD AUTO: 61 % (ref 43–75)
NRBC BLD AUTO-RTO: 0 /100 WBCS
P AXIS: 88 DEGREES
PLATELET # BLD AUTO: 294 THOUSANDS/UL (ref 149–390)
PMV BLD AUTO: 9.9 FL (ref 8.9–12.7)
POTASSIUM SERPL-SCNC: 2.6 MMOL/L (ref 3.5–5.3)
PR INTERVAL: 126 MS
PROT SERPL-MCNC: 6.8 G/DL (ref 6.4–8.2)
QRS AXIS: 72 DEGREES
QRSD INTERVAL: 74 MS
QT INTERVAL: 424 MS
QTC INTERVAL: 483 MS
RBC # BLD AUTO: 3.99 MILLION/UL (ref 3.81–5.12)
SODIUM SERPL-SCNC: 144 MMOL/L (ref 136–145)
T WAVE AXIS: 53 DEGREES
VENTRICULAR RATE: 78 BPM
WBC # BLD AUTO: 11.67 THOUSAND/UL (ref 4.31–10.16)

## 2020-09-10 PROCEDURE — 99220 PR INITIAL OBSERVATION CARE/DAY 70 MINUTES: CPT | Performed by: STUDENT IN AN ORGANIZED HEALTH CARE EDUCATION/TRAINING PROGRAM

## 2020-09-10 PROCEDURE — 96365 THER/PROPH/DIAG IV INF INIT: CPT

## 2020-09-10 PROCEDURE — 93010 ELECTROCARDIOGRAM REPORT: CPT | Performed by: INTERNAL MEDICINE

## 2020-09-10 PROCEDURE — 83690 ASSAY OF LIPASE: CPT | Performed by: EMERGENCY MEDICINE

## 2020-09-10 PROCEDURE — 83605 ASSAY OF LACTIC ACID: CPT | Performed by: EMERGENCY MEDICINE

## 2020-09-10 PROCEDURE — 36415 COLL VENOUS BLD VENIPUNCTURE: CPT | Performed by: EMERGENCY MEDICINE

## 2020-09-10 PROCEDURE — 83735 ASSAY OF MAGNESIUM: CPT | Performed by: EMERGENCY MEDICINE

## 2020-09-10 PROCEDURE — 96361 HYDRATE IV INFUSION ADD-ON: CPT

## 2020-09-10 PROCEDURE — G1004 CDSM NDSC: HCPCS

## 2020-09-10 PROCEDURE — 93005 ELECTROCARDIOGRAM TRACING: CPT

## 2020-09-10 PROCEDURE — 99285 EMERGENCY DEPT VISIT HI MDM: CPT

## 2020-09-10 PROCEDURE — 85025 COMPLETE CBC W/AUTO DIFF WBC: CPT | Performed by: EMERGENCY MEDICINE

## 2020-09-10 PROCEDURE — 74177 CT ABD & PELVIS W/CONTRAST: CPT

## 2020-09-10 PROCEDURE — 80053 COMPREHEN METABOLIC PANEL: CPT | Performed by: EMERGENCY MEDICINE

## 2020-09-10 PROCEDURE — 99285 EMERGENCY DEPT VISIT HI MDM: CPT | Performed by: EMERGENCY MEDICINE

## 2020-09-10 RX ORDER — POTASSIUM CHLORIDE 14.9 MG/ML
20 INJECTION INTRAVENOUS
Status: COMPLETED | OUTPATIENT
Start: 2020-09-10 | End: 2020-09-10

## 2020-09-10 RX ORDER — HEPARIN SODIUM 5000 [USP'U]/ML
5000 INJECTION, SOLUTION INTRAVENOUS; SUBCUTANEOUS EVERY 8 HOURS SCHEDULED
Status: DISCONTINUED | OUTPATIENT
Start: 2020-09-10 | End: 2020-09-14 | Stop reason: HOSPADM

## 2020-09-10 RX ORDER — POTASSIUM CHLORIDE 20 MEQ/1
40 TABLET, EXTENDED RELEASE ORAL ONCE
Status: COMPLETED | OUTPATIENT
Start: 2020-09-10 | End: 2020-09-10

## 2020-09-10 RX ORDER — DICYCLOMINE HCL 20 MG
20 TABLET ORAL
Status: DISCONTINUED | OUTPATIENT
Start: 2020-09-10 | End: 2020-09-12

## 2020-09-10 RX ADMIN — DICYCLOMINE HYDROCHLORIDE 20 MG: 20 TABLET ORAL at 16:03

## 2020-09-10 RX ADMIN — POTASSIUM CHLORIDE 40 MEQ: 1500 TABLET, EXTENDED RELEASE ORAL at 16:03

## 2020-09-10 RX ADMIN — DICYCLOMINE HYDROCHLORIDE 20 MG: 20 TABLET ORAL at 21:26

## 2020-09-10 RX ADMIN — POTASSIUM CHLORIDE 20 MEQ: 200 INJECTION, SOLUTION INTRAVENOUS at 16:09

## 2020-09-10 RX ADMIN — HEPARIN SODIUM 5000 UNITS: 5000 INJECTION INTRAVENOUS; SUBCUTANEOUS at 21:26

## 2020-09-10 RX ADMIN — SODIUM CHLORIDE 1000 ML: 0.9 INJECTION, SOLUTION INTRAVENOUS at 15:27

## 2020-09-10 RX ADMIN — POTASSIUM CHLORIDE 20 MEQ: 200 INJECTION, SOLUTION INTRAVENOUS at 18:11

## 2020-09-10 RX ADMIN — IOHEXOL 100 ML: 350 INJECTION, SOLUTION INTRAVENOUS at 16:41

## 2020-09-10 NOTE — ASSESSMENT & PLAN NOTE
· Secondary to colitis, unclear etiology of colitis could be infectious vs inflammatory from stool impaction noted on imaging   · Given severity of diarrhea, might benefit from bowel regimen but currently declining  · Noted leukocytosis on arrival  · C diff pending, hold off on antibiotics for now, continue supportive care, monitor lytes

## 2020-09-10 NOTE — ED NOTES
1 CC: abd cramping and diarrhea  2  Orientation status: A&OX4  3  Abnormal labs/vitals/focused assessment: K 2 6, WBC 11 67  4  Medication/drips: K+ 20 meq  5  Narcotic time: none  6  IV lines/drains/etc : 20g RAC  7  Isolation status: contact r/o cdiff  8  Skin: intact  9  Ambulation status: self  10   ED phone number: ER charge Rn with questions       Dee Reddy RN  09/10/20 7142

## 2020-09-10 NOTE — TELEPHONE ENCOUNTER
Pt arrived for her appt in so much pain, tearful, that she could not get out of the car  She has rectal pain, abdominal pain and stomach bloating/pain  Discussed with Dr Demetrius Gonzalez and referred patient to the ER at [facility]  Her  is with her and driving and they will go to ER immediately

## 2020-09-10 NOTE — ED PROVIDER NOTES
Pt Name: Silver Will  MRN: [id number]  [place] 1970  Age/Sex: 48 y o  female  Date of evaluation: 9/10/2020  PCP: Luis Carlos Licea MD    [address] [place] [address]    Chief Complaint   Patient presents with    Abdominal Pain     Patient c/o not being able to control her bowels for the last week now  Patient states she has been having on going diarrhea  Patient is also c/o feeling bloated and not being able to eat  HPI    48 y o  female presenting with abdominal pain and diarrhea  Patient states for the past week she has had diffuse abdominal pain, bloating sensation, diarrhea  She is unable to control her bowels  Describes abdominal pain as crampy in nature  Denies nausea, vomiting, fevers, chills  Does report difficulty with urination, no hematuria or dysuria  Denies history of abdominal surgeries  Patient recently was hospitalized in August for Legionella pneumonia  States she has been drinking fluids, however does not have much of an appetite for solid foods  Denies blood in stool          Past Medical and Surgical History    History reviewed  No pertinent past medical history  Past Surgical History:   Procedure Laterality Date    CERVICAL BIOPSY  W/ LOOP ELECTRODE EXCISION         Family History   Problem Relation Age of Onset    Colon cancer Maternal Grandfather        Social History     Tobacco Use    Smoking status: Current Every Day Smoker     Types: Cigarettes    Smokeless tobacco: Never Used   Substance Use Topics    Alcohol use: Not Currently     Frequency: 4 or more times a week     Comment: "was drinking daily, stopped saturday"    Drug use: No           Allergies    Allergies   Allergen Reactions    Amoxil [Amoxicillin] Rash       Home Medications    Prior to Admission medications    Medication Sig Start Date End Date Taking?  Authorizing Provider   atorvastatin (LIPITOR) 10 mg tablet take 1 tablet by mouth once daily 5/13/20   Luis Carlos Licea MD   ondansetron (ZOFRAN-ODT) 4 mg disintegrating tablet Take 1 tablet (4 mg total) by mouth every 8 (eight) hours as needed for nausea  Patient not taking: Reported on 8/11/2020 7/21/20   Deven Roman PA-C   zolpidem (AMBIEN CR) 12 5 MG CR tablet take 1 tablet by mouth at bedtime 8/23/20   David Mitchell MD           Review of Systems    Review of Systems   Constitutional: Negative for chills and fever  HENT: Negative for rhinorrhea and sore throat  Eyes: Negative for pain and visual disturbance  Respiratory: Negative for cough and shortness of breath  Cardiovascular: Negative for chest pain and leg swelling  Gastrointestinal: Positive for abdominal distention, abdominal pain and diarrhea  Negative for blood in stool, nausea and vomiting  Genitourinary: Positive for difficulty urinating  Negative for dysuria and hematuria  Musculoskeletal: Negative for back pain and myalgias  Skin: Negative for rash and wound  Neurological: Negative for syncope and headaches  All other systems reviewed and negative  Physical Exam      ED Triage Vitals [09/10/20 1427]   Temperature Pulse Respirations Blood Pressure SpO2   98 1 °F (36 7 °C) 98 20 121/86 95 %      Temp Source Heart Rate Source Patient Position - Orthostatic VS BP Location FiO2 (%)   Oral Monitor Sitting Left arm --      Pain Score       --               Physical Exam  Constitutional:       General: She is not in acute distress  HENT:      Head: Normocephalic and atraumatic  Nose: Nose normal       Mouth/Throat:      Mouth: Mucous membranes are moist    Eyes:      Extraocular Movements: Extraocular movements intact  Pupils: Pupils are equal, round, and reactive to light  Neck:      Musculoskeletal: Normal range of motion and neck supple  Cardiovascular:      Rate and Rhythm: Normal rate and regular rhythm  Pulmonary:      Effort: No respiratory distress  Breath sounds: Normal breath sounds  No wheezing     Abdominal:      Comments: Diffusely tender to palpation, somewhat distended, no masses palpated   Musculoskeletal: Normal range of motion  General: No swelling or deformity  Skin:     General: Skin is warm  Findings: No erythema  Neurological:      Mental Status: She is alert and oriented to person, place, and time  Mental status is at baseline                Diagnostic Results      Labs:    Results Reviewed     Procedure Component Value Units Date/Time    Comprehensive metabolic panel [[id number]]  (Abnormal) Collected:  09/10/20 1526    Lab Status:  Final result Specimen:  Blood from Arm, Right Updated:  09/10/20 1556     Sodium 144 mmol/L      Potassium 2 6 mmol/L      Chloride 105 mmol/L      CO2 30 mmol/L      ANION GAP 9 mmol/L      BUN 18 mg/dL      Creatinine 0 77 mg/dL      Glucose 101 mg/dL      Calcium 9 0 mg/dL      AST 9 U/L      ALT 15 U/L      Alkaline Phosphatase 133 U/L      Total Protein 6 8 g/dL      Albumin 3 0 g/dL      Total Bilirubin 0 60 mg/dL      eGFR 90 ml/min/1 73sq m     Narrative:       [place] guidelines for Chronic Kidney Disease (CKD):     Stage 1 with normal or high GFR (GFR > 90 mL/min/1 73 square meters)    Stage 2 Mild CKD (GFR = 60-89 mL/min/1 73 square meters)    Stage 3A Moderate CKD (GFR = 45-59 mL/min/1 73 square meters)    Stage 3B Moderate CKD (GFR = 30-44 mL/min/1 73 square meters)    Stage 4 Severe CKD (GFR = 15-29 mL/min/1 73 square meters)    Stage 5 End Stage CKD (GFR <15 mL/min/1 73 square meters)  Note: GFR calculation is accurate only with a steady state creatinine    Lipase [[id number]]  (Normal) Collected:  09/10/20 1526    Lab Status:  Final result Specimen:  Blood from Arm, Right Updated:  09/10/20 1554     Lipase 97 u/L     Magnesium [[id number]]  (Normal) Collected:  09/10/20 1526    Lab Status:  Final result Specimen:  Blood from Arm, Right Updated:  09/10/20 1554     Magnesium 1 9 mg/dL     Lactic acid [[id number]]  (Normal) Collected:  09/10/20 1526 Lab Status:  Final result Specimen:  Blood from Arm, Right Updated:  09/10/20 1554     LACTIC ACID 1 1 mmol/L     Narrative:       Result may be elevated if tourniquet was used during collection  CBC and differential [[id number]]  (Abnormal) Collected:  09/10/20 1527    Lab Status:  Final result Specimen:  Blood from Arm, Right Updated:  09/10/20 1537     WBC 11 67 Thousand/uL      RBC 3 99 Million/uL      Hemoglobin 11 1 g/dL      Hematocrit 35 2 %      MCV 88 fL      MCH 27 8 pg      MCHC 31 5 g/dL      RDW 15 0 %      MPV 9 9 fL      Platelets 441 Thousands/uL      nRBC 0 /100 WBCs      Neutrophils Relative 61 %      Immat GRANS % 0 %      Lymphocytes Relative 26 %      Monocytes Relative 12 %      Eosinophils Relative 1 %      Basophils Relative 0 %      Neutrophils Absolute 7 10 Thousands/µL      Immature Grans Absolute 0 04 Thousand/uL      Lymphocytes Absolute 3 05 Thousands/µL      Monocytes Absolute 1 36 Thousand/µL      Eosinophils Absolute 0 07 Thousand/µL      Basophils Absolute 0 05 Thousands/µL     UA w Reflex to Microscopic w Reflex to Culture [[id number]]     Lab Status:  No result Specimen:  Urine     Clostridium difficile toxin by PCR with EIA [[id number]]     Lab Status:  No result Specimen:  Stool from Per Rectum           All labs reviewed and utilized in the medical decision making process    Radiology:    CT abdomen pelvis with contrast   Final Result      Large volume of rectosigmoid stool with associated mural thickening and surrounding inflammatory change  Findings may reflect stercoral proctitis/colitis or other infectious or inflammatory colitis  Workstation performed: [id number]             All radiology studies independently viewed by me and interpreted by the radiologist     Procedure    Procedures        MDM    Differential diagnosis includes clostridium difficile infection, diverticulitis, other infectious/inflammatory colitis   Partial SBO is also on differential although less likely given lack of abdominal surgical hx  Patient likely dehydrated, will five IV fluids  Appears to be having generalized abdominal pain from spasmodic abdominal pain, will give bentyl  Will give IV fluids  Obtain blood work and CT abdomen/pelvis  ED Course as of Sep 10 1813   Thu Sep 10, 2020   1543 WBC(!): 11 67   1543 Hemoglobin(!): 11 1   1544 Platelet Count: 893   1554 LACTIC ACID: 1 1   1555 Magnesium: 1 9   1555 Lipase: 97   1557 Will give PO and IV repletion  Potassium(!!): 2 6   1557 Creatinine: 0 77   1656 Patient care signed out to Dr Zimmerman Daily with CT scan read still pending, will need hospitalization since she has significant hypokalemia  Medications   dicyclomine (BENTYL) tablet 20 mg (20 mg Oral Given 9/10/20 1603)   potassium chloride 20 mEq IVPB (premix) (20 mEq Intravenous New Bag 9/10/20 1811)   sodium chloride 0 9 % bolus 1,000 mL (0 mL Intravenous Stopped 9/10/20 1723)   potassium chloride (K-DUR,KLOR-CON) CR tablet 40 mEq (40 mEq Oral Given 9/10/20 1603)   iohexol (OMNIPAQUE) 350 MG/ML injection (MULTI-DOSE) 100 mL (100 mL Intravenous Given 9/10/20 1641)           FINAL IMPRESSION    Final diagnoses:   Hypokalemia   Diarrhea, unspecified type   Generalized abdominal pain         DISPOSITION    Time reflects when diagnosis was documented in both MDM as applicable and the Disposition within this note     Time User Action Codes Description Comment    9/10/2020  4:56 PM Evelyn Canard Add [E87 6] Hypokalemia     9/10/2020  4:56 PM Evelyn Canard Add [R19 7] Diarrhea, unspecified type     9/10/2020  4:56 PM Evelyn Canard Add [R10 84] Generalized abdominal pain       ED Disposition     ED Disposition Condition Date/Time Comment    Admit Stable Thu Sep 10, 2020  5:33 PM Case was discussed with AUBREY and the patient's admission status was agreed to be Admission Status: observation status to the service of Dr Javan Thomas           Follow-up Information    None           PATIENT REFERRED TO:    No follow-up provider specified  DISCHARGE MEDICATIONS:    Patient's Medications   Discharge Prescriptions    No medications on file       No discharge procedures on file  Tony Nunn DO        This note was partially completed using voice recognition technology, and was scanned for gross errors; however some errors may still exist  Please contact the author with any questions or requests for clarification        Tony Nunn DO  09/10/20 3548

## 2020-09-10 NOTE — PLAN OF CARE
Problem: Potential for Falls  Goal: Patient will remain free of falls  Description: INTERVENTIONS:  - Assess patient frequently for physical needs  -  Identify cognitive and physical deficits and behaviors that affect risk of falls    -  [place] fall precautions as indicated by assessment   - Educate patient/family on patient safety including physical limitations  - Instruct patient to call for assistance with activity based on assessment  - Modify environment to reduce risk of injury  - Consider OT/PT consult to assist with strengthening/mobility  Outcome: Progressing     Problem: PAIN - ADULT  Goal: Verbalizes/displays adequate comfort level or baseline comfort level  Description: Interventions:  - Encourage patient to monitor pain and request assistance  - Assess pain using appropriate pain scale  - Administer analgesics based on type and severity of pain and evaluate response  - Implement non-pharmacological measures as appropriate and evaluate response  - Consider cultural and social influences on pain and pain management  - Notify physician/advanced practitioner if interventions unsuccessful or patient reports new pain  Outcome: Progressing     Problem: INFECTION - ADULT  Goal: Absence or prevention of progression during hospitalization  Description: INTERVENTIONS:  - Assess and monitor for signs and symptoms of infection  - Monitor lab/diagnostic results  - Monitor all insertion sites, i e  indwelling lines, tubes, and drains  - Monitor endotracheal if appropriate and nasal secretions for changes in amount and color  - [place] appropriate cooling/warming therapies per order  - Administer medications as ordered  - Instruct and encourage patient and family to use good hand hygiene technique  - Identify and instruct in appropriate isolation precautions for identified infection/condition  Outcome: Progressing  Goal: Absence of fever/infection during neutropenic period  Description: INTERVENTIONS:  - Monitor WBC    Outcome: Progressing     Problem: SAFETY ADULT  Goal: Patient will remain free of falls  Description: INTERVENTIONS:  - Assess patient frequently for physical needs  -  Identify cognitive and physical deficits and behaviors that affect risk of falls    -  [place] fall precautions as indicated by assessment   - Educate patient/family on patient safety including physical limitations  - Instruct patient to call for assistance with activity based on assessment  - Modify environment to reduce risk of injury  - Consider OT/PT consult to assist with strengthening/mobility  Outcome: Progressing  Goal: Maintain or return to baseline ADL function  Description: INTERVENTIONS:  -  Assess patient's ability to carry out ADLs; assess patient's baseline for ADL function and identify physical deficits which impact ability to perform ADLs (bathing, care of mouth/teeth, toileting, grooming, dressing, etc )  - Assess/evaluate cause of self-care deficits   - Assess range of motion  - Assess patient's mobility; develop plan if impaired  - Assess patient's need for assistive devices and provide as appropriate  - Encourage maximum independence but intervene and supervise when necessary  - Involve family in performance of ADLs  - Assess for home care needs following discharge   - Consider OT consult to assist with ADL evaluation and planning for discharge  - Provide patient education as appropriate  Outcome: Progressing  Goal: Maintain or return mobility status to optimal level  Description: INTERVENTIONS:  - Assess patient's baseline mobility status (ambulation, transfers, stairs, etc )    - Identify cognitive and physical deficits and behaviors that affect mobility  - Identify mobility aids required to assist with transfers and/or ambulation (gait belt, sit-to-stand, lift, walker, cane, etc )  - [place] fall precautions as indicated by assessment  - Record patient progress and toleration of activity level on Mobility SBAR; progress patient to next Phase/Stage  - Instruct patient to call for assistance with activity based on assessment  - Consider rehabilitation consult to assist with strengthening/weightbearing, etc   Outcome: Progressing     Problem: DISCHARGE PLANNING  Goal: Discharge to home or other facility with appropriate resources  Description: INTERVENTIONS:  - Identify barriers to discharge w/patient and caregiver  - Arrange for needed discharge resources and transportation as appropriate  - Identify discharge learning needs (meds, wound care, etc )  - Arrange for interpretive services to assist at discharge as needed  - Refer to Case Management Department for coordinating discharge planning if the patient needs post-hospital services based on physician/advanced practitioner order or complex needs related to functional status, cognitive ability, or social support system  Outcome: Progressing     Problem: Knowledge Deficit  Goal: Patient/family/caregiver demonstrates understanding of disease process, treatment plan, medications, and discharge instructions  Description: Complete learning assessment and assess knowledge base    Interventions:  - Provide teaching at level of understanding  - Provide teaching via preferred learning methods  Outcome: Progressing

## 2020-09-11 LAB
ALBUMIN SERPL BCP-MCNC: 2.3 G/DL (ref 3.5–5)
ALP SERPL-CCNC: 102 U/L (ref 46–116)
ALT SERPL W P-5'-P-CCNC: 12 U/L (ref 12–78)
ANION GAP SERPL CALCULATED.3IONS-SCNC: 10 MMOL/L (ref 4–13)
AST SERPL W P-5'-P-CCNC: 9 U/L (ref 5–45)
BASOPHILS # BLD AUTO: 0.03 THOUSANDS/ΜL (ref 0–0.1)
BASOPHILS NFR BLD AUTO: 0 % (ref 0–1)
BILIRUB SERPL-MCNC: 0.4 MG/DL (ref 0.2–1)
BUN SERPL-MCNC: 12 MG/DL (ref 5–25)
CALCIUM SERPL-MCNC: 8.1 MG/DL (ref 8.3–10.1)
CHLORIDE SERPL-SCNC: 108 MMOL/L (ref 100–108)
CO2 SERPL-SCNC: 24 MMOL/L (ref 21–32)
CREAT SERPL-MCNC: 0.52 MG/DL (ref 0.6–1.3)
EOSINOPHIL # BLD AUTO: 0.06 THOUSAND/ΜL (ref 0–0.61)
EOSINOPHIL NFR BLD AUTO: 1 % (ref 0–6)
ERYTHROCYTE [DISTWIDTH] IN BLOOD BY AUTOMATED COUNT: 15 % (ref 11.6–15.1)
GFR SERPL CREATININE-BSD FRML MDRD: 112 ML/MIN/1.73SQ M
GLUCOSE P FAST SERPL-MCNC: 92 MG/DL (ref 65–99)
GLUCOSE SERPL-MCNC: 92 MG/DL (ref 65–140)
HCT VFR BLD AUTO: 30.6 % (ref 34.8–46.1)
HGB BLD-MCNC: 9.6 G/DL (ref 11.5–15.4)
IMM GRANULOCYTES # BLD AUTO: 0.05 THOUSAND/UL (ref 0–0.2)
IMM GRANULOCYTES NFR BLD AUTO: 1 % (ref 0–2)
LYMPHOCYTES # BLD AUTO: 1.84 THOUSANDS/ΜL (ref 0.6–4.47)
LYMPHOCYTES NFR BLD AUTO: 20 % (ref 14–44)
MAGNESIUM SERPL-MCNC: 1.8 MG/DL (ref 1.6–2.6)
MCH RBC QN AUTO: 28.3 PG (ref 26.8–34.3)
MCHC RBC AUTO-ENTMCNC: 31.4 G/DL (ref 31.4–37.4)
MCV RBC AUTO: 90 FL (ref 82–98)
MONOCYTES # BLD AUTO: 1.72 THOUSAND/ΜL (ref 0.17–1.22)
MONOCYTES NFR BLD AUTO: 19 % (ref 4–12)
NEUTROPHILS # BLD AUTO: 5.44 THOUSANDS/ΜL (ref 1.85–7.62)
NEUTS SEG NFR BLD AUTO: 59 % (ref 43–75)
NRBC BLD AUTO-RTO: 0 /100 WBCS
PLATELET # BLD AUTO: 247 THOUSANDS/UL (ref 149–390)
PMV BLD AUTO: 9.9 FL (ref 8.9–12.7)
POTASSIUM SERPL-SCNC: 3.3 MMOL/L (ref 3.5–5.3)
PROT SERPL-MCNC: 5.6 G/DL (ref 6.4–8.2)
RBC # BLD AUTO: 3.39 MILLION/UL (ref 3.81–5.12)
SODIUM SERPL-SCNC: 142 MMOL/L (ref 136–145)
WBC # BLD AUTO: 9.14 THOUSAND/UL (ref 4.31–10.16)

## 2020-09-11 PROCEDURE — 99226 PR SBSQ OBSERVATION CARE/DAY 35 MINUTES: CPT | Performed by: FAMILY MEDICINE

## 2020-09-11 PROCEDURE — 85025 COMPLETE CBC W/AUTO DIFF WBC: CPT | Performed by: STUDENT IN AN ORGANIZED HEALTH CARE EDUCATION/TRAINING PROGRAM

## 2020-09-11 PROCEDURE — 87493 C DIFF AMPLIFIED PROBE: CPT | Performed by: EMERGENCY MEDICINE

## 2020-09-11 PROCEDURE — 80053 COMPREHEN METABOLIC PANEL: CPT | Performed by: STUDENT IN AN ORGANIZED HEALTH CARE EDUCATION/TRAINING PROGRAM

## 2020-09-11 PROCEDURE — 83735 ASSAY OF MAGNESIUM: CPT | Performed by: STUDENT IN AN ORGANIZED HEALTH CARE EDUCATION/TRAINING PROGRAM

## 2020-09-11 RX ORDER — POTASSIUM CHLORIDE 20 MEQ/1
40 TABLET, EXTENDED RELEASE ORAL ONCE
Status: COMPLETED | OUTPATIENT
Start: 2020-09-11 | End: 2020-09-11

## 2020-09-11 RX ADMIN — DICYCLOMINE HYDROCHLORIDE 20 MG: 20 TABLET ORAL at 21:10

## 2020-09-11 RX ADMIN — DICYCLOMINE HYDROCHLORIDE 20 MG: 20 TABLET ORAL at 12:33

## 2020-09-11 RX ADMIN — HEPARIN SODIUM 5000 UNITS: 5000 INJECTION INTRAVENOUS; SUBCUTANEOUS at 21:10

## 2020-09-11 RX ADMIN — POTASSIUM CHLORIDE 40 MEQ: 1500 TABLET, EXTENDED RELEASE ORAL at 09:38

## 2020-09-11 RX ADMIN — DICYCLOMINE HYDROCHLORIDE 20 MG: 20 TABLET ORAL at 06:32

## 2020-09-11 RX ADMIN — DICYCLOMINE HYDROCHLORIDE 20 MG: 20 TABLET ORAL at 16:30

## 2020-09-11 RX ADMIN — HEPARIN SODIUM 5000 UNITS: 5000 INJECTION INTRAVENOUS; SUBCUTANEOUS at 14:38

## 2020-09-11 RX ADMIN — HEPARIN SODIUM 5000 UNITS: 5000 INJECTION INTRAVENOUS; SUBCUTANEOUS at 06:32

## 2020-09-11 NOTE — PROGRESS NOTES
Progress Note - Manuela Asher 1970, 48 y o  female MRN: [id number]    Unit/Bed#: -01 Encounter: [id number]    Primary Care Provider: Evelyne Medina MD   Date and time admitted to hospital: 9/10/2020  2:53 PM        * Colitis  Assessment & Plan  · Infectious versus inflammatory  Should follow-up outpatient gastroenterology  · The diarrhea stopped at this moment  · Patient continues to have right lower quadrant pain and tenderness  · Follow C diff results  The less likely secondary to absence of diarrhea today  · Followed leukocytosis trend  · No fever  · Avoid narcotics    Diarrhea  Assessment & Plan  · Plan as above    Hypokalemia  Assessment & Plan  · Replace and recheck    Hyperlipidemia  Assessment & Plan  · Continue home dose lipitor             Subjective/Objective     Subjective:   Seen and examined at bedside  No more diarrhea  No fever   Has abd tenderness pain is better      Objective:  Vitals: Blood pressure 155/96, pulse 92, temperature 98 6 °F (37 °C), resp  rate 19, height 5' 3" (1 6 m), weight 61 2 kg (135 lb), SpO2 99 %  ,Body mass index is 23 91 kg/m²  Intake/Output Summary (Last 24 hours) at 9/11/2020 1509  Last data filed at 9/11/2020 0901  Gross per 24 hour   Intake 360 ml   Output 500 ml   Net -140 ml       Invasive Devices     Peripheral Intravenous Line            Peripheral IV 09/10/20 Right Antecubital less than 1 day                Physical Exam: RLQ abd tendernss, BS(+) 4 quads, no new focal neuro defcits, s1s2+ r, lungs CTA, nc at perrla, no new skin rash or joint swelling      Lab, Imaging and other studies: I have personally reviewed pertinent reports      VTE Pharmacologic Prophylaxis: Sequential compression device (Venodyne)   VTE Mechanical Prophylaxis: sequential compression device

## 2020-09-11 NOTE — CASE MANAGEMENT
CM spoke with pt  Introduced self and role  Pt lives with her  Lennox Monroy in a 3 story house with 1 GO and 2 flights (24 steps) to reach her bedroom  Pt is able to navigate steps and is independent with ADL's  She uses no DME's  She has gone to OP/PT through [facility], but was never an inpatient  She has never used HHC  Denies substance abuse  She does have some depression, but it is not treated with medication  She is a smoker-PPD-1 x 30 years  Pt uses [facility] in [place] and has no problem with co-pays  Dr Sameer Salter is her PCP  She does not have a POA or Advanced Directive and does not want info at this itme  She works and drives  Her  will transport home when medically cleared  CM reviewed discharge planning process including the following: identifying help at home, patient preference for discharge planning needs, pharmacy preference, and availability of treatment team to discuss questions or concerns patient and/or family may have regarding understanding medications and recognizing signs and symptoms once discharged  CM also encouraged patient to follow up with all recommended appointments after discharge   Patient advised of importance for patient and family to participate in managing patients medical well being

## 2020-09-11 NOTE — PLAN OF CARE
Problem: Potential for Falls  Goal: Patient will remain free of falls  Description: INTERVENTIONS:  - Assess patient frequently for physical needs  -  Identify cognitive and physical deficits and behaviors that affect risk of falls    -  [place] fall precautions as indicated by assessment   - Educate patient/family on patient safety including physical limitations  - Instruct patient to call for assistance with activity based on assessment  - Modify environment to reduce risk of injury  - Consider OT/PT consult to assist with strengthening/mobility  Outcome: Progressing

## 2020-09-11 NOTE — ASSESSMENT & PLAN NOTE
· Infectious versus inflammatory  Should follow-up outpatient gastroenterology  · The diarrhea stopped at this moment  · Patient continues to have right lower quadrant pain and tenderness  · Follow C diff results    The less likely secondary to absence of diarrhea today  · Followed leukocytosis trend  · No fever  · Avoid narcotics

## 2020-09-11 NOTE — H&P
H&P- Anju Ambriz 1970, 48 y o  female MRN: [id number]    Unit/Bed#: -01 Encounter: [id number]    Primary Care Provider: Rivas Rudd MD   Date and time admitted to hospital: 9/10/2020  2:53 PM        * Diarrhea  Assessment & Plan  · Secondary to colitis, unclear etiology of colitis could be infectious vs inflammatory from stool impaction noted on imaging   · Given severity of diarrhea, might benefit from bowel regimen but currently declining  · Noted leukocytosis on arrival  · C diff pending, hold off on antibiotics for now, continue supportive care, monitor lytes     Colitis  Assessment & Plan  · See plan for diarrhea     Hypokalemia  Assessment & Plan  · In setting of diarrhea, replete aggressively  · Monitor daily     Smoker  Assessment & Plan  ·  on smoking cessation  · Nicotine patch     Hyperlipidemia  Assessment & Plan  · Continue home dose lipitor       VTE Prophylaxis: Heparin    Code Status: Full code  POLST: POLST form is not discussed and not completed at this time  Discussion with family:  at bedside    Anticipated Length of Stay:  Patient will be admitted on an Observation basis with an anticipated length of stay of 2 midnights  Justification for Hospital Stay: diarrhea    Total Time for Visit, including Counseling / Coordination of Care: 30 minutes  Greater than 50% of this total time spent on direct patient counseling and coordination of care  Chief Complaint:   diarrhea    History of Present Illness:    Anju Ambriz is a 48 y o  female who presents with worsening diarrhea for last 1 week  Patient denies any previous episodes of similar presentation  Denies any associated symptoms such as fevers, chills, cough, chest pain, chest palpitations, nausea, vomiting  Patient does report some sweats secondary to electric blanket  Denies any sick contacts, dietary changes  Does report some distension, reduced flatulence, and loose stools, not watery   Does report decreased appetite due to distension  In ed, patient was noted to be hypokalemic and admitted for further work up of diarrhea  Review of Systems:    See above     Past Medical and Surgical History:     History reviewed  No pertinent past medical history  Past Surgical History:   Procedure Laterality Date    CERVICAL BIOPSY  W/ LOOP ELECTRODE EXCISION         Meds/Allergies:    Prior to Admission medications    Medication Sig Start Date End Date Taking? Authorizing Provider   atorvastatin (LIPITOR) 10 mg tablet take 1 tablet by mouth once daily 5/13/20  Yes Evelyne Medina MD   zolpidem (AMBIEN CR) 12 5 MG CR tablet take 1 tablet by mouth at bedtime 8/23/20  Yes Evelyne Medina MD   ondansetron (ZOFRAN-ODT) 4 mg disintegrating tablet Take 1 tablet (4 mg total) by mouth every 8 (eight) hours as needed for nausea  Patient not taking: Reported on 9/10/2020 7/21/20   Taylor Ibarra PA-C     I have reviewed home medications with patient personally  Allergies:    Allergies   Allergen Reactions    Amoxil [Amoxicillin] Rash       Social History:     Marital Status: /Civil Union   Occupation: na  Patient Pre-hospital Living Situation: na  Patient Pre-hospital Level of Mobility: na  Patient Pre-hospital Diet Restrictions: na  Substance Use History:   Social History     Substance and Sexual Activity   Alcohol Use Not Currently    Frequency: 4 or more times a week    Comment: "was drinking daily, stopped saturday"     Social History     Tobacco Use   Smoking Status Current Every Day Smoker    Types: Cigarettes   Smokeless Tobacco Never Used     Social History     Substance and Sexual Activity   Drug Use No       Family History:    non-contributory    Physical Exam:     Vitals:   Blood Pressure: 154/78 (09/10/20 1731)  Pulse: 87 (09/10/20 1731)  Temperature: 98 1 °F (36 7 °C) (09/10/20 1427)  Temp Source: Oral (09/10/20 1427)  Respirations: 15 (09/10/20 1731)  Height: 5' 3" (160 cm) (09/10/20 1844)  Weight - Scale: 61 2 kg (135 lb) (09/10/20 1844)  SpO2: 99 % (09/10/20 1731)    Physical Exam  Constitutional:       Appearance: Normal appearance  HENT:      Head: Normocephalic and atraumatic  Cardiovascular:      Rate and Rhythm: Normal rate and regular rhythm  Pulmonary:      Effort: Pulmonary effort is normal  No respiratory distress  Abdominal:      General: Bowel sounds are normal  There is distension  Tenderness: There is abdominal tenderness  Skin:     General: Skin is warm and dry  Neurological:      General: No focal deficit present  Mental Status: She is alert and oriented to person, place, and time  Psychiatric:         Mood and Affect: Mood normal          Behavior: Behavior normal              Additional Data:     Lab Results: I have personally reviewed pertinent reports  Results from last 7 days   Lab Units 09/10/20  1527   WBC Thousand/uL 11 67*   HEMOGLOBIN g/dL 11 1*   HEMATOCRIT % 35 2   PLATELETS Thousands/uL 294   NEUTROS PCT % 61   LYMPHS PCT % 26   MONOS PCT % 12   EOS PCT % 1     Results from last 7 days   Lab Units 09/10/20  1526   SODIUM mmol/L 144   POTASSIUM mmol/L 2 6*   CHLORIDE mmol/L 105   CO2 mmol/L 30   BUN mg/dL 18   CREATININE mg/dL 0 77   ANION GAP mmol/L 9   CALCIUM mg/dL 9 0   ALBUMIN g/dL 3 0*   TOTAL BILIRUBIN mg/dL 0 60   ALK PHOS U/L 133*   ALT U/L 15   AST U/L 9   GLUCOSE RANDOM mg/dL 101                 Results from last 7 days   Lab Units 09/10/20  1526   LACTIC ACID mmol/L 1 1       Imaging: I have personally reviewed pertinent reports  CT abdomen pelvis with contrast   Final Result by Pedro Ruiz MD (09/10 1659)      Large volume of rectosigmoid stool with associated mural thickening and surrounding inflammatory change  Findings may reflect stercoral proctitis/colitis or other infectious or inflammatory colitis              Workstation performed: [id number]             EKG, Pathology, and Other Studies Reviewed on Admission:   · EKG: NA    Stefano / Armando Clos Records Reviewed: Yes     ** Please Note: This note has been constructed using a voice recognition system   **

## 2020-09-11 NOTE — UTILIZATION REVIEW
Initial Clinical Review    Admission: Date/Time/Statement:   Admission Orders (From admission, onward)     Ordered        09/10/20 1735  Place in Observation  Once                   Orders Placed This Encounter   Procedures    Place in Observation     Standing Status:   Standing     Number of Occurrences:   1     Order Specific Question:   Admitting Physician     Answer:   Ann Sy     Order Specific Question:   Level of Care     Answer:   Med Surg [16]     ED Arrival Information     Expected Arrival Acuity Means of Arrival Escorted By Service Admission Type    - 9/10/2020 13:46 Urgent Walk-In Spouse General Medicine Urgent    Arrival Complaint    ABDOMINAL PAIN        Chief Complaint   Patient presents with    Abdominal Pain     Patient c/o not being able to control her bowels for the last week now  Patient states she has been having on going diarrhea  Patient is also c/o feeling bloated and not being able to eat  Assessment/Plan: 48year old female to the ED from home with complaints of abdominal pain, bowel incontinence for a week   HOspitalized in August for legionella pneumonia  Admitted under observation for diarrhea, colitis, hypokalemia  SHe arrives with abdominal distention, diffusely tender abdomen  CT a/p shows: Large volume of rectosigmoid stool with associated mural thickening and surrounding inflammatory change   Findings may reflect stercoral proctitis/colitis or other infectious or inflammatory colitis  C-diff pending  Hold on abx for now  Given severity of diarrhea, may benefit from bowel regimen but she is declining  Replete potassium aggressively, like due to diarrhea  9/11 UPdate: No more diarrhea  Await C-diff sample     ED Triage Vitals   Temperature Pulse Respirations Blood Pressure SpO2   09/10/20 1427 09/10/20 1427 09/10/20 1427 09/10/20 1427 09/10/20 1427   98 1 °F (36 7 °C) 98 20 121/86 95 %      Temp Source Heart Rate Source Patient Position - Orthostatic VS BP Location FiO2 (%)   09/10/20 1427 09/10/20 1427 09/10/20 1427 09/10/20 1427 --   Oral Monitor Sitting Left arm       Pain Score       09/10/20 1844       9          Wt Readings from Last 1 Encounters:   09/10/20 61 2 kg (135 lb)     Additional Vital Signs:   Time   Temp   Pulse   Resp   BP   MAP (mmHg)   SpO2   O2 Device   Patient Position - Orthostatic VS    09/11/20 07:13:16   98 6 °F (37 °C)   92   19   155/96   116   99 %   --   --    09/10/20 22:53:49   --   72   18   153/89   110   98 %   --   --    09/10/20 1731   --   87   15   154/78   --   99 %   None (Room air)   --    09/10/20 1531   --   --   --   --   --   --   None (Room air)   --    09/10/20 1427   98 1 °F (36 7 °C)   98   20   121/86   --            Pertinent Labs/Diagnostic Test Results:     9/10 CT A/P: Large volume of rectosigmoid stool with associated mural thickening and surrounding inflammatory change   Findings may reflect stercoral proctitis/colitis or other infectious or inflammatory colitis         Results from last 7 days   Lab Units 09/11/20  0510 09/10/20  1527   WBC Thousand/uL 9 14 11 67*   HEMOGLOBIN g/dL 9 6* 11 1*   HEMATOCRIT % 30 6* 35 2   PLATELETS Thousands/uL 247 294   NEUTROS ABS Thousands/µL 5 44 7 10         Results from last 7 days   Lab Units 09/11/20  0510 09/10/20  1526   SODIUM mmol/L 142 144   POTASSIUM mmol/L 3 3* 2 6*   CHLORIDE mmol/L 108 105   CO2 mmol/L 24 30   ANION GAP mmol/L 10 9   BUN mg/dL 12 18   CREATININE mg/dL 0 52* 0 77   EGFR ml/min/1 73sq m 112 90   CALCIUM mg/dL 8 1* 9 0   MAGNESIUM mg/dL 1 8 1 9     Results from last 7 days   Lab Units 09/11/20  0510 09/10/20  1526   AST U/L 9 9   ALT U/L 12 15   ALK PHOS U/L 102 133*   TOTAL PROTEIN g/dL 5 6* 6 8   ALBUMIN g/dL 2 3* 3 0*   TOTAL BILIRUBIN mg/dL 0 40 0 60         Results from last 7 days   Lab Units 09/11/20  0510 09/10/20  1526   GLUCOSE RANDOM mg/dL 92 101         Results from last 7 days   Lab Units 09/10/20  1526   LACTIC ACID mmol/L 1 1 Results from last 7 days   Lab Units 09/10/20  1526   LIPASE u/L 97       ED Treatment:   Medication Administration from 09/10/2020 1346 to 09/10/2020 1817       Date/Time Order Dose Route Action     09/10/2020 1527 sodium chloride 0 9 % bolus 1,000 mL 1,000 mL Intravenous New Bag     09/10/2020 1603 dicyclomine (BENTYL) tablet 20 mg 20 mg Oral Given     09/10/2020 1811 potassium chloride 20 mEq IVPB (premix) 20 mEq Intravenous New Bag     09/10/2020 1609 potassium chloride 20 mEq IVPB (premix) 20 mEq Intravenous New Bag     09/10/2020 1603 potassium chloride (K-DUR,KLOR-CON) CR tablet 40 mEq 40 mEq Oral Given        History reviewed  No pertinent past medical history  Present on Admission:   Hyperlipidemia      Admitting Diagnosis: Hypokalemia [E87 6]  Abdominal pain [R10 9]  Generalized abdominal pain [R10 84]  Diarrhea, unspecified type [R19 7]  Age/Sex: 48 y o  female  Admission Orders:  CBC, Mag, CMP, C-diff, UA with reflex to culture    Scheduled Medications:  dicyclomine, 20 mg, Oral, 4x Daily (AC & HS)  heparin (porcine), 5,000 Units, Subcutaneous, Q8H [address]  potassium chloride, 40 mEq, Oral, Once      Continuous IV Infusions:     PRN Meds:         Network Utilization Review Department  [email or web address]  ATTENTION: Please call with any questions or concerns to [phone] and carefully listen to the prompts so that you are directed to the right person  All voicemails are confidential   José Ghotra all requests for admission clinical reviews, approved or denied determinations and any other requests to dedicated fax number below belonging to the campus where the patient is receiving treatment   List of dedicated fax numbers for the Facilities:  [address] [place] [address] DENIALS (Administrative/Medical Necessity) [phone]   [address] [place] [address] (Maternity/NICU/Pediatrics) [facility] [address] [phone] Nikunj Garcia [phone]   [facility] [address] [facility] [phone]   [facility]  [phone] [address] [facility]  [phone]   [address] [place] [address] 1000 [facility] [phone]

## 2020-09-12 LAB
ALBUMIN SERPL BCP-MCNC: 2.2 G/DL (ref 3.5–5)
ALP SERPL-CCNC: 103 U/L (ref 46–116)
ALT SERPL W P-5'-P-CCNC: 13 U/L (ref 12–78)
ANION GAP SERPL CALCULATED.3IONS-SCNC: 7 MMOL/L (ref 4–13)
AST SERPL W P-5'-P-CCNC: 10 U/L (ref 5–45)
BASOPHILS # BLD AUTO: 0.02 THOUSANDS/ΜL (ref 0–0.1)
BASOPHILS NFR BLD AUTO: 0 % (ref 0–1)
BILIRUB SERPL-MCNC: 0.5 MG/DL (ref 0.2–1)
BUN SERPL-MCNC: 12 MG/DL (ref 5–25)
C DIFF TOX GENS STL QL NAA+PROBE: NEGATIVE
CALCIUM SERPL-MCNC: 8.3 MG/DL (ref 8.3–10.1)
CHLORIDE SERPL-SCNC: 106 MMOL/L (ref 100–108)
CO2 SERPL-SCNC: 25 MMOL/L (ref 21–32)
CREAT SERPL-MCNC: 0.69 MG/DL (ref 0.6–1.3)
EOSINOPHIL # BLD AUTO: 0.16 THOUSAND/ΜL (ref 0–0.61)
EOSINOPHIL NFR BLD AUTO: 2 % (ref 0–6)
ERYTHROCYTE [DISTWIDTH] IN BLOOD BY AUTOMATED COUNT: 15 % (ref 11.6–15.1)
GFR SERPL CREATININE-BSD FRML MDRD: 102 ML/MIN/1.73SQ M
GLUCOSE SERPL-MCNC: 99 MG/DL (ref 65–140)
HCT VFR BLD AUTO: 32.3 % (ref 34.8–46.1)
HGB BLD-MCNC: 10.1 G/DL (ref 11.5–15.4)
IMM GRANULOCYTES # BLD AUTO: 0.05 THOUSAND/UL (ref 0–0.2)
IMM GRANULOCYTES NFR BLD AUTO: 1 % (ref 0–2)
LYMPHOCYTES # BLD AUTO: 2.89 THOUSANDS/ΜL (ref 0.6–4.47)
LYMPHOCYTES NFR BLD AUTO: 31 % (ref 14–44)
MAGNESIUM SERPL-MCNC: 1.7 MG/DL (ref 1.6–2.6)
MCH RBC QN AUTO: 28 PG (ref 26.8–34.3)
MCHC RBC AUTO-ENTMCNC: 31.3 G/DL (ref 31.4–37.4)
MCV RBC AUTO: 90 FL (ref 82–98)
MONOCYTES # BLD AUTO: 2.04 THOUSAND/ΜL (ref 0.17–1.22)
MONOCYTES NFR BLD AUTO: 22 % (ref 4–12)
NEUTROPHILS # BLD AUTO: 4.3 THOUSANDS/ΜL (ref 1.85–7.62)
NEUTS SEG NFR BLD AUTO: 44 % (ref 43–75)
NRBC BLD AUTO-RTO: 0 /100 WBCS
PLATELET # BLD AUTO: 266 THOUSANDS/UL (ref 149–390)
PMV BLD AUTO: 9.7 FL (ref 8.9–12.7)
POTASSIUM SERPL-SCNC: 3.1 MMOL/L (ref 3.5–5.3)
PROT SERPL-MCNC: 5.6 G/DL (ref 6.4–8.2)
RBC # BLD AUTO: 3.61 MILLION/UL (ref 3.81–5.12)
SODIUM SERPL-SCNC: 138 MMOL/L (ref 136–145)
TSH SERPL DL<=0.05 MIU/L-ACNC: 2.13 UIU/ML (ref 0.36–3.74)
WBC # BLD AUTO: 9.46 THOUSAND/UL (ref 4.31–10.16)

## 2020-09-12 PROCEDURE — 87046 STOOL CULTR AEROBIC BACT EA: CPT

## 2020-09-12 PROCEDURE — 87427 SHIGA-LIKE TOXIN AG IA: CPT

## 2020-09-12 PROCEDURE — 89055 LEUKOCYTE ASSESSMENT FECAL: CPT | Performed by: FAMILY MEDICINE

## 2020-09-12 PROCEDURE — 80053 COMPREHEN METABOLIC PANEL: CPT | Performed by: STUDENT IN AN ORGANIZED HEALTH CARE EDUCATION/TRAINING PROGRAM

## 2020-09-12 PROCEDURE — 85025 COMPLETE CBC W/AUTO DIFF WBC: CPT | Performed by: STUDENT IN AN ORGANIZED HEALTH CARE EDUCATION/TRAINING PROGRAM

## 2020-09-12 PROCEDURE — 84443 ASSAY THYROID STIM HORMONE: CPT | Performed by: FAMILY MEDICINE

## 2020-09-12 PROCEDURE — 99233 SBSQ HOSP IP/OBS HIGH 50: CPT | Performed by: FAMILY MEDICINE

## 2020-09-12 PROCEDURE — 83735 ASSAY OF MAGNESIUM: CPT | Performed by: STUDENT IN AN ORGANIZED HEALTH CARE EDUCATION/TRAINING PROGRAM

## 2020-09-12 PROCEDURE — 99254 IP/OBS CNSLTJ NEW/EST MOD 60: CPT | Performed by: INTERNAL MEDICINE

## 2020-09-12 PROCEDURE — 87045 FECES CULTURE AEROBIC BACT: CPT | Performed by: FAMILY MEDICINE

## 2020-09-12 RX ORDER — POTASSIUM CHLORIDE 20 MEQ/1
40 TABLET, EXTENDED RELEASE ORAL ONCE
Status: COMPLETED | OUTPATIENT
Start: 2020-09-12 | End: 2020-09-12

## 2020-09-12 RX ORDER — POTASSIUM CHLORIDE 14.9 MG/ML
20 INJECTION INTRAVENOUS ONCE
Status: COMPLETED | OUTPATIENT
Start: 2020-09-12 | End: 2020-09-12

## 2020-09-12 RX ADMIN — POTASSIUM CHLORIDE 20 MEQ: 14.9 INJECTION, SOLUTION INTRAVENOUS at 09:02

## 2020-09-12 RX ADMIN — HEPARIN SODIUM 5000 UNITS: 5000 INJECTION INTRAVENOUS; SUBCUTANEOUS at 13:59

## 2020-09-12 RX ADMIN — HEPARIN SODIUM 5000 UNITS: 5000 INJECTION INTRAVENOUS; SUBCUTANEOUS at 21:10

## 2020-09-12 RX ADMIN — POTASSIUM CHLORIDE 40 MEQ: 1500 TABLET, EXTENDED RELEASE ORAL at 09:05

## 2020-09-12 RX ADMIN — DICYCLOMINE HYDROCHLORIDE 20 MG: 20 TABLET ORAL at 06:23

## 2020-09-12 RX ADMIN — HEPARIN SODIUM 5000 UNITS: 5000 INJECTION INTRAVENOUS; SUBCUTANEOUS at 06:23

## 2020-09-12 NOTE — UTILIZATION REVIEW
Continued Stay Review    Date:    9/12/20                          Current Patient Class:    Now  Inpatient  Current Level of Care:   Med surg    09/12/20 1244    Inpatient Admission  Once       09/12/20 1243     Observation   9/10   @  1735  Changed to IP admission   9/12  @   1244  Persistent abdominal pain/diarrhea    HPI:50 y o  female initially admitted on    Observation  9/10 @  1735 with diarrhea, colitis, abdominal pain    Assessment/Plan: 9/12  Abdominal  Improved but  Still present  Had multiple episodes of  Diarrhea, 4-5  Thus  Far  9/12  Stool ordered for leukocytes and  Cultures  Avoid  immodium until stool cultures back  Start  SSRI's, bentyl d/c   GI now  Consulted       Pertinent Labs/Diagnostic Results:       Results from last 7 days   Lab Units 09/12/20  0506 09/11/20  0510 09/10/20  1527   WBC Thousand/uL 9 46 9 14 11 67*   HEMOGLOBIN g/dL 10 1* 9 6* 11 1*   HEMATOCRIT % 32 3* 30 6* 35 2   PLATELETS Thousands/uL 266 247 294   NEUTROS ABS Thousands/µL 4 30 5 44 7 10         Results from last 7 days   Lab Units 09/12/20  0506 09/11/20  0510 09/10/20  1526   SODIUM mmol/L 138 142 144   POTASSIUM mmol/L 3 1* 3 3* 2 6*   CHLORIDE mmol/L 106 108 105   CO2 mmol/L 25 24 30   ANION GAP mmol/L 7 10 9   BUN mg/dL 12 12 18   CREATININE mg/dL 0 69 0 52* 0 77   EGFR ml/min/1 73sq m 102 112 90   CALCIUM mg/dL 8 3 8 1* 9 0   MAGNESIUM mg/dL 1 7 1 8 1 9     Results from last 7 days   Lab Units 09/12/20  0506 09/11/20  0510 09/10/20  1526   AST U/L 10 9 9   ALT U/L 13 12 15   ALK PHOS U/L 103 102 133*   TOTAL PROTEIN g/dL 5 6* 5 6* 6 8   ALBUMIN g/dL 2 2* 2 3* 3 0*   TOTAL BILIRUBIN mg/dL 0 50 0 40 0 60         Results from last 7 days   Lab Units 09/12/20  0506 09/11/20  0510 09/10/20  1526   GLUCOSE RANDOM mg/dL 99 92 101           Results from last 7 days   Lab Units 09/10/20  1526   LACTIC ACID mmol/L 1 1           Results from last 7 days   Lab Units 09/10/20  1526   LIPASE u/L 97           Results from last 7 days   Lab Units 09/11/20  1405   C DIFF TOXIN B  Negative             Vital Signs:   98 4 °F (36 9 °C)   62   16   117/69   85   96 %   None (Room air)          Medications:   Scheduled Medications:  heparin (porcine), 5,000 Units, Subcutaneous, Q8H [address]      Continuous IV Infusions:     PRN Meds:       Discharge Plan:     home    Network Utilization Review Department  [email or web address]  ATTENTION: Please call with any questions or concerns to [phone] and carefully listen to the prompts so that you are directed to the right person  All voicemails are confidential   Salvatore Sanon all requests for admission clinical reviews, approved or denied determinations and any other requests to dedicated fax number below belonging to the campus where the patient is receiving treatment   List of dedicated fax numbers for the Facilities:  [address] DENIALS (Administrative/Medical Necessity) [phone]   [address] (Maternity/NICU/Pediatrics) [phone]   Angie Ballard [phone]   Mckay Eagle [phone]   Cheryle Lundberg [phone]   Elva Samuel [phone]   [address] [facility] [address] [place] [address] [phone]   [facility]  [phone]   [address] [facility] [address]  [address] [facility] [address] [facility] [phone]

## 2020-09-12 NOTE — ASSESSMENT & PLAN NOTE
· C diff negative, check TSH  · Was starting to feel better yesterday but then again today she has had 4-5 episodes of diarrhea since 8:00 a m  · Will check a stool leukocytes and cultures  · GI consulted  · Patient has alternating diarrhea and constipation indicated above IBS    Will start SSRIs  · will avoid using Imodium till stool leukocytes are back  · Bentyl discontinued  · Abdominal pain better

## 2020-09-12 NOTE — PROGRESS NOTES
Progress Note - Mika Mcmullen 1970, 48 y o  female MRN: [id number]    Unit/Bed#: -01 Encounter: [id number]    Primary Care Provider: Alf Craft MD   Date and time admitted to hospital: 9/10/2020  2:53 PM        * Colitis  Assessment & Plan  · C diff negative, check TSH  · Was starting to feel better yesterday but then again today she has had 4-5 episodes of diarrhea since 8:00 a m  · Will check a stool leukocytes and cultures  · GI consulted  · Patient has alternating diarrhea and constipation indicated above IBS  Will start SSRIs  · will avoid using Imodium till stool leukocytes are back  · Bentyl discontinued  · Abdominal pain better    Diarrhea  Assessment & Plan  · Plan as above    Hypokalemia  Assessment & Plan  · Replace and recheck    Smoker  Assessment & Plan  ·  on smoking cessation  · Nicotine patch           Subjective/Objective     Subjective:   Seen and examined at bedside  Abdominal pain is somewhat better than yesterday but continues to multiple episodes of diarrhea  4-5 episodes so far since I rounded on her this morning at around 8:00 a m  No fever or other GI symptoms    Objective:  Vitals: Blood pressure 117/69, pulse 62, temperature 98 4 °F (36 9 °C), temperature source Oral, resp  rate 16, height 5' 3" (1 6 m), weight 61 2 kg (135 lb), SpO2 96 %  ,Body mass index is 23 91 kg/m²      No intake or output data in the 24 hours ending 09/12/20 1240    Invasive Devices     Peripheral Intravenous Line            Peripheral IV 09/10/20 Right Antecubital 1 day                Physical Exam: General- Awake, alert and oriented x 3, looks comfortable  HEENT- Normocephalic, atraumatic, oral mucosa- moist  Neck- Supple, No carotid bruit, no JVD  CVS- Normal S1/ S2, Regular rate and rhythm, No murmur, No edema  Respiratory system- B/L clear breath sounds, no wheezing  Abdomen- soft mild right lower quadrant tenderness, bowel sounds audible all 4 quadrants, no rigidity or guarding  Genitourinary- No suprapubic tenderness, No CVA tenderness  Skin- No new bruise or rash  Musculoskeletal- No gross deformity  Psych- No acute psychosis  CNS- CN II- XII grossly intact, No acute focal neurologic deficit noted      Lab, Imaging and other studies: I have personally reviewed pertinent reports      VTE Pharmacologic Prophylaxis: Sequential compression device (Venodyne)   VTE Mechanical Prophylaxis: sequential compression device

## 2020-09-13 LAB
ALBUMIN SERPL BCP-MCNC: 2 G/DL (ref 3.5–5)
ALP SERPL-CCNC: 92 U/L (ref 46–116)
ALT SERPL W P-5'-P-CCNC: 13 U/L (ref 12–78)
ANION GAP SERPL CALCULATED.3IONS-SCNC: 9 MMOL/L (ref 4–13)
AST SERPL W P-5'-P-CCNC: 11 U/L (ref 5–45)
BASOPHILS # BLD AUTO: 0.04 THOUSANDS/ΜL (ref 0–0.1)
BASOPHILS NFR BLD AUTO: 1 % (ref 0–1)
BILIRUB SERPL-MCNC: 0.3 MG/DL (ref 0.2–1)
BUN SERPL-MCNC: 6 MG/DL (ref 5–25)
CALCIUM SERPL-MCNC: 8.2 MG/DL (ref 8.3–10.1)
CHLORIDE SERPL-SCNC: 106 MMOL/L (ref 100–108)
CO2 SERPL-SCNC: 26 MMOL/L (ref 21–32)
CREAT SERPL-MCNC: 0.6 MG/DL (ref 0.6–1.3)
EOSINOPHIL # BLD AUTO: 0.31 THOUSAND/ΜL (ref 0–0.61)
EOSINOPHIL NFR BLD AUTO: 4 % (ref 0–6)
ERYTHROCYTE [DISTWIDTH] IN BLOOD BY AUTOMATED COUNT: 14.9 % (ref 11.6–15.1)
GFR SERPL CREATININE-BSD FRML MDRD: 107 ML/MIN/1.73SQ M
GLUCOSE SERPL-MCNC: 82 MG/DL (ref 65–140)
HCT VFR BLD AUTO: 30.4 % (ref 34.8–46.1)
HGB BLD-MCNC: 9.6 G/DL (ref 11.5–15.4)
IMM GRANULOCYTES # BLD AUTO: 0.08 THOUSAND/UL (ref 0–0.2)
IMM GRANULOCYTES NFR BLD AUTO: 1 % (ref 0–2)
LYMPHOCYTES # BLD AUTO: 3.81 THOUSANDS/ΜL (ref 0.6–4.47)
LYMPHOCYTES NFR BLD AUTO: 44 % (ref 14–44)
MAGNESIUM SERPL-MCNC: 1.7 MG/DL (ref 1.6–2.6)
MCH RBC QN AUTO: 28 PG (ref 26.8–34.3)
MCHC RBC AUTO-ENTMCNC: 31.6 G/DL (ref 31.4–37.4)
MCV RBC AUTO: 89 FL (ref 82–98)
MONOCYTES # BLD AUTO: 1.4 THOUSAND/ΜL (ref 0.17–1.22)
MONOCYTES NFR BLD AUTO: 17 % (ref 4–12)
NEUTROPHILS # BLD AUTO: 2.78 THOUSANDS/ΜL (ref 1.85–7.62)
NEUTS SEG NFR BLD AUTO: 33 % (ref 43–75)
NRBC BLD AUTO-RTO: 0 /100 WBCS
PLATELET # BLD AUTO: 293 THOUSANDS/UL (ref 149–390)
PMV BLD AUTO: 9.8 FL (ref 8.9–12.7)
POTASSIUM SERPL-SCNC: 2.9 MMOL/L (ref 3.5–5.3)
PROT SERPL-MCNC: 5.2 G/DL (ref 6.4–8.2)
RBC # BLD AUTO: 3.43 MILLION/UL (ref 3.81–5.12)
SODIUM SERPL-SCNC: 141 MMOL/L (ref 136–145)
WBC # BLD AUTO: 8.42 THOUSAND/UL (ref 4.31–10.16)

## 2020-09-13 PROCEDURE — 99232 SBSQ HOSP IP/OBS MODERATE 35: CPT | Performed by: INTERNAL MEDICINE

## 2020-09-13 PROCEDURE — 83735 ASSAY OF MAGNESIUM: CPT | Performed by: STUDENT IN AN ORGANIZED HEALTH CARE EDUCATION/TRAINING PROGRAM

## 2020-09-13 PROCEDURE — 85025 COMPLETE CBC W/AUTO DIFF WBC: CPT | Performed by: STUDENT IN AN ORGANIZED HEALTH CARE EDUCATION/TRAINING PROGRAM

## 2020-09-13 PROCEDURE — 99233 SBSQ HOSP IP/OBS HIGH 50: CPT | Performed by: FAMILY MEDICINE

## 2020-09-13 PROCEDURE — 80053 COMPREHEN METABOLIC PANEL: CPT | Performed by: STUDENT IN AN ORGANIZED HEALTH CARE EDUCATION/TRAINING PROGRAM

## 2020-09-13 RX ORDER — MAGNESIUM SULFATE 1 G/100ML
1 INJECTION INTRAVENOUS ONCE
Status: COMPLETED | OUTPATIENT
Start: 2020-09-13 | End: 2020-09-13

## 2020-09-13 RX ORDER — POTASSIUM CHLORIDE 14.9 MG/ML
20 INJECTION INTRAVENOUS
Status: COMPLETED | OUTPATIENT
Start: 2020-09-13 | End: 2020-09-13

## 2020-09-13 RX ORDER — POTASSIUM CHLORIDE 20 MEQ/1
20 TABLET, EXTENDED RELEASE ORAL ONCE
Status: COMPLETED | OUTPATIENT
Start: 2020-09-13 | End: 2020-09-13

## 2020-09-13 RX ADMIN — HEPARIN SODIUM 5000 UNITS: 5000 INJECTION INTRAVENOUS; SUBCUTANEOUS at 05:17

## 2020-09-13 RX ADMIN — HEPARIN SODIUM 5000 UNITS: 5000 INJECTION INTRAVENOUS; SUBCUTANEOUS at 22:08

## 2020-09-13 RX ADMIN — POTASSIUM CHLORIDE 20 MEQ: 1500 TABLET, EXTENDED RELEASE ORAL at 20:45

## 2020-09-13 RX ADMIN — POTASSIUM CHLORIDE 20 MEQ: 14.9 INJECTION, SOLUTION INTRAVENOUS at 11:30

## 2020-09-13 RX ADMIN — HEPARIN SODIUM 5000 UNITS: 5000 INJECTION INTRAVENOUS; SUBCUTANEOUS at 14:43

## 2020-09-13 RX ADMIN — MAGNESIUM SULFATE HEPTAHYDRATE 1 G: 1 INJECTION, SOLUTION INTRAVENOUS at 12:23

## 2020-09-13 RX ADMIN — POTASSIUM CHLORIDE 20 MEQ: 14.9 INJECTION, SOLUTION INTRAVENOUS at 13:50

## 2020-09-13 NOTE — ASSESSMENT & PLAN NOTE
· C diff negative, TSH normal  · It appears like patient may be having overflow incontinence  Soapsuds enema ordered  · Pending stool leukocytes and cultures  · If does not improve-consult GI  · Patient has alternating diarrhea and constipation indicated above IBS    Will start SSRIs  · will avoid using Imodium till stool leukocytes are back  · Bentyl discontinued  · Abdominal pain better

## 2020-09-13 NOTE — CONSULTS
Consultation - 126 [facility]  Jackelin Conde 48 y o  female MRN: [id number]  Unit/Bed#: -01 Encounter: [id number]        Consults    Reason for Consult / Principal Problem:     Diarrhea and abdominal discomfort      ASSESSMENT AND PLAN:      Diarrhea and abdominal discomfort:  Her CT showed a large amount of stool in her rectosigmoid along with some mural thickening suggestive of stercoral proctitis or infectious colitis  Since her symptoms have only been present for about one week, inflammatory bowel disease is less likely  We will check her stool studies and continue supportive care  If her stool studies are negative, we could consider colonoscopy as our next step  Otherwise she should still have an elective outpatient colonoscopy given her age 48      ______________________________________________________________________    HPI:  She presented to the hospital with approximately one week of diarrhea and abdominal discomfort  She said her stools were watery but now seem to be becoming more firm  She also reports poor appetite but denies any nausea, vomiting, fevers, chills bleeding, or weight loss  She said she has never previously had a colonoscopy and she denies any family history of colon polyps or colon cancer  She reports a history of irritable bowel syndrome with alternating diarrhea and constipation but has never had a previous episode like this  Here her CT scan showed a large volume of rectosigmoid stool with associated mural thickening  Sergio Ghotra REVIEW OF SYSTEMS:    CONSTITUTIONAL: Denies any fever, chills, rigors, and weight loss  HEENT: No earache or tinnitus  Denies hearing loss or visual disturbances  CARDIOVASCULAR: No chest pain or palpitations  RESPIRATORY: Denies any cough, hemoptysis, shortness of breath or dyspnea on exertion  GASTROINTESTINAL: As noted in the History of Present Illness  GENITOURINARY: No problems with urination   Denies any hematuria or dysuria  NEUROLOGIC: No dizziness or vertigo, denies headaches  MUSCULOSKELETAL: Denies any muscle or joint pain  SKIN: Denies skin rashes or itching  ENDOCRINE: Denies excessive thirst  Denies intolerance to heat or cold  PSYCHOSOCIAL: Denies depression or anxiety  Denies any recent memory loss  Historical Information   History reviewed  No pertinent past medical history  Past Surgical History:   Procedure Laterality Date    CERVICAL BIOPSY  W/ LOOP ELECTRODE EXCISION       Social History   Social History     Substance and Sexual Activity   Alcohol Use Not Currently    Frequency: 4 or more times a week    Comment: "was drinking daily, stopped saturday"     Social History     Substance and Sexual Activity   Drug Use No     Social History     Tobacco Use   Smoking Status Current Every Day Smoker    Types: Cigarettes   Smokeless Tobacco Never Used     Family History   Problem Relation Age of Onset    Colon cancer Maternal Grandfather        Meds/Allergies     Medications Prior to Admission   Medication    atorvastatin (LIPITOR) 10 mg tablet    zolpidem (AMBIEN CR) 12 5 MG CR tablet    ondansetron (ZOFRAN-ODT) 4 mg disintegrating tablet     Current Facility-Administered Medications   Medication Dose Route Frequency    heparin (porcine) subcutaneous injection 5,000 Units  5,000 Units Subcutaneous Q8H [address]       Allergies   Allergen Reactions    Amoxil [Amoxicillin] Rash           Objective     Blood pressure 130/73, pulse 59, temperature 98 3 °F (36 8 °C), resp  rate 16, height 5' 3" (1 6 m), weight 61 2 kg (135 lb), SpO2 98 %  Body mass index is 23 91 kg/m²        Intake/Output Summary (Last 24 hours) at 9/12/2020 2331  Last data filed at 9/12/2020 2057  Gross per 24 hour   Intake 240 ml   Output 550 ml   Net -310 ml         PHYSICAL EXAM:      General Appearance:   Alert, cooperative, no distress   HEENT:   Normocephalic, atraumatic, anicteric      Neck:  Supple, symmetrical, trachea midline Lungs:   Clear to auscultation bilaterally; no rales, rhonchi or wheezing; respirations unlabored    Heart[de-identified]   Regular rate and rhythm; no murmur, rub, or gallop     Abdomen:   Soft, non-tender, non-distended; normal bowel sounds; no masses, no organomegaly    Genitalia:   Deferred    Rectal:   Deferred    Extremities:  No cyanosis, clubbing or edema    Pulses:  2+ and symmetric all extremities    Skin:  No jaundice, rashes, or lesions    Lymph nodes:  No palpable cervical lymphadenopathy        Lab Results:   Admission on 09/10/2020   Component Date Value    WBC 09/10/2020 11 67*    RBC 09/10/2020 3 99     Hemoglobin 09/10/2020 11 1*    Hematocrit 09/10/2020 35 2     MCV 09/10/2020 88     MCH 09/10/2020 27 8     MCHC 09/10/2020 31 5     RDW 09/10/2020 15 0     MPV 09/10/2020 9 9     Platelets 21/16/7794 294     nRBC 09/10/2020 0     Neutrophils Relative 09/10/2020 61     Immat GRANS % 09/10/2020 0     Lymphocytes Relative 09/10/2020 26     Monocytes Relative 09/10/2020 12     Eosinophils Relative 09/10/2020 1     Basophils Relative 09/10/2020 0     Neutrophils Absolute 09/10/2020 7 10     Immature Grans Absolute 09/10/2020 0 04     Lymphocytes Absolute 09/10/2020 3 05     Monocytes Absolute 09/10/2020 1 36*    Eosinophils Absolute 09/10/2020 0 07     Basophils Absolute 09/10/2020 0 05     Sodium 09/10/2020 144     Potassium 09/10/2020 2 6*    Chloride 09/10/2020 105     CO2 09/10/2020 30     ANION GAP 09/10/2020 9     BUN 09/10/2020 18     Creatinine 09/10/2020 0 77     Glucose 09/10/2020 101     Calcium 09/10/2020 9 0     AST 09/10/2020 9     ALT 09/10/2020 15     Alkaline Phosphatase 09/10/2020 133*    Total Protein 09/10/2020 6 8     Albumin 09/10/2020 3 0*    Total Bilirubin 09/10/2020 0 60     eGFR 09/10/2020 90     Lipase 09/10/2020 97     Magnesium 09/10/2020 1 9     C difficile toxin by PCR 09/11/2020 Negative     LACTIC ACID 09/10/2020 1 1     Ventricular Rate 09/10/2020 78     Atrial Rate 09/10/2020 78     KS Interval 09/10/2020 126     QRSD Interval 09/10/2020 74     QT Interval 09/10/2020 424     QTC Interval 09/10/2020 483     P Axis 09/10/2020 88     QRS Axis 09/10/2020 72     T Wave Axis 09/10/2020 53     WBC 09/11/2020 9 14     RBC 09/11/2020 3 39*    Hemoglobin 09/11/2020 9 6*    Hematocrit 09/11/2020 30 6*    MCV 09/11/2020 90     MCH 09/11/2020 28 3     MCHC 09/11/2020 31 4     RDW 09/11/2020 15 0     MPV 09/11/2020 9 9     Platelets 53/02/7522 247     nRBC 09/11/2020 0     Neutrophils Relative 09/11/2020 59     Immat GRANS % 09/11/2020 1     Lymphocytes Relative 09/11/2020 20     Monocytes Relative 09/11/2020 19*    Eosinophils Relative 09/11/2020 1     Basophils Relative 09/11/2020 0     Neutrophils Absolute 09/11/2020 5 44     Immature Grans Absolute 09/11/2020 0 05     Lymphocytes Absolute 09/11/2020 1 84     Monocytes Absolute 09/11/2020 1 72*    Eosinophils Absolute 09/11/2020 0 06     Basophils Absolute 09/11/2020 0 03     Magnesium 09/11/2020 1 8     Sodium 09/11/2020 142     Potassium 09/11/2020 3 3*    Chloride 09/11/2020 108     CO2 09/11/2020 24     ANION GAP 09/11/2020 10     BUN 09/11/2020 12     Creatinine 09/11/2020 0 52*    Glucose 09/11/2020 92     Glucose, Fasting 09/11/2020 92     Calcium 09/11/2020 8 1*    AST 09/11/2020 9     ALT 09/11/2020 12     Alkaline Phosphatase 09/11/2020 102     Total Protein 09/11/2020 5 6*    Albumin 09/11/2020 2 3*    Total Bilirubin 09/11/2020 0 40     eGFR 09/11/2020 112     WBC 09/12/2020 9 46     RBC 09/12/2020 3 61*    Hemoglobin 09/12/2020 10 1*    Hematocrit 09/12/2020 32 3*    MCV 09/12/2020 90     MCH 09/12/2020 28 0     MCHC 09/12/2020 31 3*    RDW 09/12/2020 15 0     MPV 09/12/2020 9 7     Platelets 57/46/0680 266     nRBC 09/12/2020 0     Neutrophils Relative 09/12/2020 44     Immat GRANS % 09/12/2020 1     Lymphocytes Relative 09/12/2020 31     Monocytes Relative 09/12/2020 22*    Eosinophils Relative 09/12/2020 2     Basophils Relative 09/12/2020 0     Neutrophils Absolute 09/12/2020 4 30     Immature Grans Absolute 09/12/2020 0 05     Lymphocytes Absolute 09/12/2020 2 89     Monocytes Absolute 09/12/2020 2 04*    Eosinophils Absolute 09/12/2020 0 16     Basophils Absolute 09/12/2020 0 02     Magnesium 09/12/2020 1 7     Sodium 09/12/2020 138     Potassium 09/12/2020 3 1*    Chloride 09/12/2020 106     CO2 09/12/2020 25     ANION GAP 09/12/2020 7     BUN 09/12/2020 12     Creatinine 09/12/2020 0 69     Glucose 09/12/2020 99     Calcium 09/12/2020 8 3     AST 09/12/2020 10     ALT 09/12/2020 13     Alkaline Phosphatase 09/12/2020 103     Total Protein 09/12/2020 5 6*    Albumin 09/12/2020 2 2*    Total Bilirubin 09/12/2020 0 50     eGFR 09/12/2020 102     TSH 3RD GENERATON 09/12/2020 2 128        Imaging Studies: I have personally reviewed pertinent imaging studies

## 2020-09-13 NOTE — PROGRESS NOTES
Progress Note - Ginny Led 1970, 48 y o  female MRN: [id number]    Unit/Bed#: -01 Encounter: [id number]    Primary Care Provider: Waleska Dale MD   Date and time admitted to hospital: 9/10/2020  2:53 PM        * Colitis  Assessment & Plan  · C diff negative, TSH normal  · It appears like patient may be having overflow incontinence  Soapsuds enema ordered  · Pending stool leukocytes and cultures  · If does not improve-consult GI  · Patient has alternating diarrhea and constipation indicated above IBS  Will start SSRIs  · will avoid using Imodium till stool leukocytes are back  · Bentyl discontinued  · Abdominal pain better    Diarrhea  Assessment & Plan  · Plan as above    Hypokalemia  Assessment & Plan  · Ongoing replacement  Recheck    Smoker  Assessment & Plan  ·  on smoking cessation  · Nicotine patch     Hyperlipidemia  Assessment & Plan  · Continue home dose lipitor             Subjective/Objective     Subjective:   Seen and examined at bedside  Complains of abdominal discomfort  Though the episodes of diarrhea resolved patient says that she is unable to defecate the hard stool but there is some watery stool coming from around  No fever  Abdominal distension noted    Objective:  Vitals: Blood pressure 137/79, pulse 65, temperature 98 2 °F (36 8 °C), temperature source Oral, resp  rate 17, height 5' 3" (1 6 m), weight 61 2 kg (135 lb), SpO2 96 %  ,Body mass index is 23 91 kg/m²        Intake/Output Summary (Last 24 hours) at 9/13/2020 1233  Last data filed at 9/13/2020 0915  Gross per 24 hour   Intake 480 ml   Output 1250 ml   Net -770 ml       Invasive Devices     Peripheral Intravenous Line            Peripheral IV 09/10/20 Right Antecubital 2 days                Physical Exam: General- Awake, alert and oriented x 3, looks comfortable  HEENT- Normocephalic, atraumatic, oral mucosa- moist  Neck- Supple, No carotid bruit, no JVD  CVS- Normal S1/ S2, Regular rate and rhythm, No murmur, No edema  Respiratory system- B/L clear breath sounds, no wheezing  Abdomen- abdomen mildly distended, mild tenderness right lower quadrant, bowel sounds audible all 4 quadrants   Genitourinary- No suprapubic tenderness, No CVA tenderness  Skin- No new bruise or rash  Musculoskeletal- No gross deformity  Psych- No acute psychosis  CNS- CN II- XII grossly intact, No acute focal neurologic deficit noted    Lab, Imaging and other studies: I have personally reviewed pertinent reports      VTE Pharmacologic Prophylaxis: Sequential compression device (Venodyne)   VTE Mechanical Prophylaxis: sequential compression device

## 2020-09-13 NOTE — PLAN OF CARE
Problem: Potential for Falls  Goal: Patient will remain free of falls  Description: INTERVENTIONS:  - Assess patient frequently for physical needs  -  Identify cognitive and physical deficits and behaviors that affect risk of falls    -  [place] fall precautions as indicated by assessment   - Educate patient/family on patient safety including physical limitations  - Instruct patient to call for assistance with activity based on assessment  - Modify environment to reduce risk of injury  - Consider OT/PT consult to assist with strengthening/mobility  Outcome: Progressing     Problem: PAIN - ADULT  Goal: Verbalizes/displays adequate comfort level or baseline comfort level  Description: Interventions:  - Encourage patient to monitor pain and request assistance  - Assess pain using appropriate pain scale  - Administer analgesics based on type and severity of pain and evaluate response  - Implement non-pharmacological measures as appropriate and evaluate response  - Consider cultural and social influences on pain and pain management  - Notify physician/advanced practitioner if interventions unsuccessful or patient reports new pain  Outcome: Progressing     Problem: INFECTION - ADULT  Goal: Absence or prevention of progression during hospitalization  Description: INTERVENTIONS:  - Assess and monitor for signs and symptoms of infection  - Monitor lab/diagnostic results  - Monitor all insertion sites, i e  indwelling lines, tubes, and drains  - Monitor endotracheal if appropriate and nasal secretions for changes in amount and color  - [place] appropriate cooling/warming therapies per order  - Administer medications as ordered  - Instruct and encourage patient and family to use good hand hygiene technique  - Identify and instruct in appropriate isolation precautions for identified infection/condition  Outcome: Progressing  Goal: Absence of fever/infection during neutropenic period  Description: INTERVENTIONS:  - Monitor WBC    Outcome: Progressing     Problem: SAFETY ADULT  Goal: Patient will remain free of falls  Description: INTERVENTIONS:  - Assess patient frequently for physical needs  -  Identify cognitive and physical deficits and behaviors that affect risk of falls    -  [place] fall precautions as indicated by assessment   - Educate patient/family on patient safety including physical limitations  - Instruct patient to call for assistance with activity based on assessment  - Modify environment to reduce risk of injury  - Consider OT/PT consult to assist with strengthening/mobility  Outcome: Progressing  Goal: Maintain or return to baseline ADL function  Description: INTERVENTIONS:  -  Assess patient's ability to carry out ADLs; assess patient's baseline for ADL function and identify physical deficits which impact ability to perform ADLs (bathing, care of mouth/teeth, toileting, grooming, dressing, etc )  - Assess/evaluate cause of self-care deficits   - Assess range of motion  - Assess patient's mobility; develop plan if impaired  - Assess patient's need for assistive devices and provide as appropriate  - Encourage maximum independence but intervene and supervise when necessary  - Involve family in performance of ADLs  - Assess for home care needs following discharge   - Consider OT consult to assist with ADL evaluation and planning for discharge  - Provide patient education as appropriate  Outcome: Progressing  Goal: Maintain or return mobility status to optimal level  Description: INTERVENTIONS:  - Assess patient's baseline mobility status (ambulation, transfers, stairs, etc )    - Identify cognitive and physical deficits and behaviors that affect mobility  - Identify mobility aids required to assist with transfers and/or ambulation (gait belt, sit-to-stand, lift, walker, cane, etc )  - [place] fall precautions as indicated by assessment  - Record patient progress and toleration of activity level on Mobility SBAR; progress patient to next Phase/Stage  - Instruct patient to call for assistance with activity based on assessment  - Consider rehabilitation consult to assist with strengthening/weightbearing, etc   Outcome: Progressing     Problem: DISCHARGE PLANNING  Goal: Discharge to home or other facility with appropriate resources  Description: INTERVENTIONS:  - Identify barriers to discharge w/patient and caregiver  - Arrange for needed discharge resources and transportation as appropriate  - Identify discharge learning needs (meds, wound care, etc )  - Arrange for interpretive services to assist at discharge as needed  - Refer to Case Management Department for coordinating discharge planning if the patient needs post-hospital services based on physician/advanced practitioner order or complex needs related to functional status, cognitive ability, or social support system  Outcome: Progressing     Problem: Knowledge Deficit  Goal: Patient/family/caregiver demonstrates understanding of disease process, treatment plan, medications, and discharge instructions  Description: Complete learning assessment and assess knowledge base  Interventions:  - Provide teaching at level of understanding  - Provide teaching via preferred learning methods  Outcome: Progressing     Problem: Nutrition/Hydration-ADULT  Goal: Nutrient/Hydration intake appropriate for improving, restoring or maintaining nutritional needs  Description: Monitor and assess patient's nutrition/hydration status for malnutrition  Collaborate with interdisciplinary team and initiate plan and interventions as ordered  Monitor patient's weight and dietary intake as ordered or per policy  Utilize nutrition screening tool and intervene as necessary  Determine patient's food preferences and provide high-protein, high-caloric foods as appropriate       INTERVENTIONS:  - Monitor oral intake, urinary output, labs, and treatment plans  - Assess nutrition and hydration status and recommend course of action  - Evaluate amount of meals eaten  - Assist patient with eating if necessary   - Allow adequate time for meals  - Recommend/ encourage appropriate diets, oral nutritional supplements, and vitamin/mineral supplements  - Order, calculate, and assess calorie counts as needed  - Recommend, monitor, and adjust tube feedings and TPN/PPN based on assessed needs  - Assess need for intravenous fluids  - Provide specific nutrition/hydration education as appropriate  - Include patient/family/caregiver in decisions related to nutrition  Outcome: Progressing

## 2020-09-14 ENCOUNTER — TELEPHONE (OUTPATIENT)
Dept: GASTROENTEROLOGY | Facility: CLINIC | Age: 50
End: 2020-09-14

## 2020-09-14 VITALS
TEMPERATURE: 99.1 F | DIASTOLIC BLOOD PRESSURE: 78 MMHG | BODY MASS INDEX: 23.92 KG/M2 | RESPIRATION RATE: 16 BRPM | WEIGHT: 135 LBS | SYSTOLIC BLOOD PRESSURE: 126 MMHG | OXYGEN SATURATION: 97 % | HEART RATE: 72 BPM | HEIGHT: 63 IN

## 2020-09-14 DIAGNOSIS — E87.6 HYPOKALEMIA: Primary | ICD-10-CM

## 2020-09-14 LAB
ANION GAP SERPL CALCULATED.3IONS-SCNC: 6 MMOL/L (ref 4–13)
BUN SERPL-MCNC: 6 MG/DL (ref 5–25)
CALCIUM SERPL-MCNC: 8 MG/DL (ref 8.3–10.1)
CHLORIDE SERPL-SCNC: 109 MMOL/L (ref 100–108)
CO2 SERPL-SCNC: 26 MMOL/L (ref 21–32)
CREAT SERPL-MCNC: 0.64 MG/DL (ref 0.6–1.3)
GFR SERPL CREATININE-BSD FRML MDRD: 104 ML/MIN/1.73SQ M
GLUCOSE SERPL-MCNC: 84 MG/DL (ref 65–140)
POTASSIUM SERPL-SCNC: 3.3 MMOL/L (ref 3.5–5.3)
SODIUM SERPL-SCNC: 141 MMOL/L (ref 136–145)

## 2020-09-14 PROCEDURE — 99239 HOSP IP/OBS DSCHRG MGMT >30: CPT | Performed by: FAMILY MEDICINE

## 2020-09-14 PROCEDURE — 99232 SBSQ HOSP IP/OBS MODERATE 35: CPT | Performed by: PHYSICIAN ASSISTANT

## 2020-09-14 PROCEDURE — NC001 PR NO CHARGE: Performed by: PHYSICIAN ASSISTANT

## 2020-09-14 PROCEDURE — 80048 BASIC METABOLIC PNL TOTAL CA: CPT | Performed by: FAMILY MEDICINE

## 2020-09-14 RX ORDER — POTASSIUM CHLORIDE 20 MEQ/1
20 TABLET, EXTENDED RELEASE ORAL 2 TIMES DAILY
Qty: 40 TABLET | Refills: 0 | Status: SHIPPED | OUTPATIENT
Start: 2020-09-14 | End: 2021-07-06

## 2020-09-14 RX ORDER — POTASSIUM CHLORIDE 20 MEQ/1
40 TABLET, EXTENDED RELEASE ORAL ONCE
Status: COMPLETED | OUTPATIENT
Start: 2020-09-14 | End: 2020-09-14

## 2020-09-14 RX ADMIN — HEPARIN SODIUM 5000 UNITS: 5000 INJECTION INTRAVENOUS; SUBCUTANEOUS at 14:47

## 2020-09-14 RX ADMIN — POTASSIUM CHLORIDE 40 MEQ: 1500 TABLET, EXTENDED RELEASE ORAL at 11:44

## 2020-09-14 RX ADMIN — HEPARIN SODIUM 5000 UNITS: 5000 INJECTION INTRAVENOUS; SUBCUTANEOUS at 05:08

## 2020-09-14 RX ADMIN — POLYETHYLENE GLYCOL 3350, SODIUM SULFATE ANHYDROUS, SODIUM BICARBONATE, SODIUM CHLORIDE, POTASSIUM CHLORIDE 4000 ML: 236; 22.74; 6.74; 5.86; 2.97 POWDER, FOR SOLUTION ORAL at 05:08

## 2020-09-14 NOTE — DISCHARGE SUMMARY
Discharge- Melanie Sahu 1970, 48 y o  female MRN: [id number]    Unit/Bed#: -01 Encounter: [id number]    Primary Care Provider: Valorie Lynn MD   Date and time admitted to hospital: 9/10/2020  2:53 PM        * Colitis  Assessment & Plan  · OP colonoscopy  Cleared for DC by GI  · C diff negative, TSH normal  · Overflow incontinence  · Soap suds enema did not help for constipation  · golytely helped  · Now better  · Ready to be DC    Diarrhea  Assessment & Plan  · Overflow incontinence  · Plan as above    Hypokalemia  Assessment & Plan  · Ongoing replacement  Recheck    Smoker  Assessment & Plan  ·  on smoking cessation  · Nicotine patch     Hyperlipidemia  Assessment & Plan  · Continue home dose lipitor                 Resolved Problems  Date Reviewed: 8/11/2020    None          Admission Date:   Admission Orders (From admission, onward)     Ordered        09/12/20 1243  Inpatient Admission  Once         09/10/20 1735  Place in Observation  Once                     Admitting Diagnosis: Hypokalemia [E87 6]  Abdominal pain [R10 9]  Generalized abdominal pain [R10 84]  Diarrhea, unspecified type [R19 7]    HPI: Melanie Sahu is a 48 y o  female who presents with worsening diarrhea for last 1 week  Patient denies any previous episodes of similar presentation  Denies any associated symptoms such as fevers, chills, cough, chest pain, chest palpitations, nausea, vomiting  Patient does report some sweats secondary to electric blanket  Denies any sick contacts, dietary changes  Does report some distension, reduced flatulence, and loose stools, not watery  Does report decreased appetite due to distension  In ed, patient was noted to be hypokalemic and admitted for further work up of diarrhea  Procedures Performed: No orders of the defined types were placed in this encounter  Summary of Hospital Course: After hospitalization patient was started colon regimen to improve constipation   Her abd pain improved with golytely  Plan was that if the patient did not do better with the medical treatment, will undergo colonocopy here with GI  She did well with the medical Rx and was cleared by GI for DC home with OP colonoscopy planning  Patient and her family in agreement with the plan    Significant Findings, Care, Treatment and Services Provided:   CT abdomen pelvis with contrast [[id number]]  Collected: 09/10/20 1654    Order Status: Completed  Updated: 09/10/20 1700    Narrative:      CT ABDOMEN AND PELVIS WITH IV CONTRAST     INDICATION:   diffuse abdominal pain/tenderness, distended abdomen, diarrhea  COMPARISON:  None  TECHNIQUE:  CT examination of the abdomen and pelvis was performed  Axial, sagittal, and coronal 2D reformatted images were created from the source data and submitted for interpretation  Radiation dose length product (DLP) for this visit:  436 5 mGy-cm    This examination, like all CT scans performed in the [facility], was performed utilizing techniques to minimize radiation dose exposure, including the use of iterative   reconstruction and automated exposure control  IV Contrast:  100 mL of iohexol (OMNIPAQUE)   Enteric Contrast:  Enteric contrast was not administered  FINDINGS:     ABDOMEN     LOWER CHEST:  No clinically significant abnormality identified in the visualized lower chest      LIVER/BILIARY TREE:  Unremarkable  GALLBLADDER:  No calcified gallstones  No pericholecystic inflammatory change  SPLEEN:  Unremarkable  PANCREAS:  Unremarkable  ADRENAL GLANDS:  Unremarkable  KIDNEYS/URETERS:  One or more simple renal cyst(s) is noted   Otherwise unremarkable kidneys   No hydronephrosis  STOMACH AND BOWEL:  Mural thickening involving the rectosigmoid is identified surrounding inflammatory change and a large volume of stool present   Findings may represent stercoral proctitis/colitis or other infectious or inflammatory colitis  Sergio Ghotra APPENDIX:  No findings to suggest appendicitis  ABDOMINOPELVIC CAVITY:  No ascites   No pneumoperitoneum   No lymphadenopathy  VESSELS:  Unremarkable for patient's age  PELVIS     REPRODUCTIVE ORGANS:  Unremarkable for patient's age  URINARY BLADDER:  Unremarkable  ABDOMINAL WALL/INGUINAL REGIONS:  Unremarkable  OSSEOUS STRUCTURES:  No acute fracture or destructive osseous lesion  Impression:        Large volume of rectosigmoid stool with associated mural thickening and surrounding inflammatory change   Findings may reflect stercoral proctitis/colitis or other infectious or inflammatory colitis  Complications: none    Condition at Discharge: good     General- Awake, alert and oriented x 3, looks comfortable  HEENT- Normocephalic, atraumatic, oral mucosa- moist  Neck- Supple, No carotid bruit, no JVD  CVS- Normal S1/ S2, Regular rate and rhythm, No murmur, No edema  Respiratory system- B/L clear breath sounds, no wheezing  Abdomen- mild distention which is improving  Genitourinary- No suprapubic tenderness, No CVA tenderness  Skin- No new bruise or rash  Musculoskeletal- No gross deformity  Psych- No acute psychosis  CNS- CN II- XII grossly intact, No acute focal neurologic deficit noted        Discharge instructions/Information to patient and family:   See after visit summary for information provided to patient and family  Provisions for Follow-Up Care:  See after visit summary for information related to follow-up care and any pertinent home health orders  PCP: Christy Mcfarlandr, MD    Disposition: Home    Planned Readmission: No    Discharge Statement   I spent 45 minutes discharging the patient  This time was spent on the day of discharge  I had direct contact with the patient on the day of discharge  Additional documentation is required if more than 30 minutes were spent on discharge       Discharge Medications:  See after visit summary for reconciled discharge medications provided to patient and family

## 2020-09-14 NOTE — DISCHARGE INSTRUCTIONS
Constipation   WHAT YOU NEED TO KNOW:   Constipation is when you have hard, dry bowel movements, or you go longer than usual between bowel movements  DISCHARGE INSTRUCTIONS:   Return to the emergency department if:   · You have blood in your bowel movements  · You have a fever and abdominal pain with the constipation  Contact your healthcare provider if:   · Your constipation gets worse  · You start to vomit  · You have questions or concerns about your condition or care  Medicines:   · Medicine or a fiber supplement  may help make your bowel movement softer  A laxative may help relax and loosen your intestines to help you have a bowel movement  You may also be given medicine to increase fluid in your intestines  The fluid may help move bowel movements through your intestines  · Take your medicine as directed  Contact your healthcare provider if you think your medicine is not helping or if you have side effects  Tell him of her if you are allergic to any medicine  Keep a list of the medicines, vitamins, and herbs you take  Include the amounts, and when and why you take them  Bring the list or the pill bottles to follow-up visits  Carry your medicine list with you in case of an emergency  Manage your constipation:   · Drink liquids as directed  You may need to drink extra liquids to help soften and move your bowels  Ask how much liquid to drink each day and which liquids are best for you  · Eat high-fiber foods  This may help decrease constipation by adding bulk to your bowel movements  High-fiber foods include fruit, vegetables, whole-grain breads and cereals, and beans  Your healthcare provider or dietitian can help you create a high-fiber meal plan  · Exercise regularly  Regular physical activity can help stimulate your intestines  Ask which exercises are best for you  · Schedule a time each day to have a bowel movement    This may help train your body to have regular bowel movements  Bend forward while you are on the toilet to help move the bowel movement out  Sit on the toilet for at least 10 minutes, even if you do not have a bowel movement  Follow up with your healthcare provider as directed:  Write down your questions so you remember to ask them during your visits  © 2017 [address] [employer] [employer] Information is for End User's use only and may not be sold, redistributed or otherwise used for commercial purposes  All illustrations and images included in CareNotes® are the copyrighted property of [employer]  or [employer]  The above information is an  only  It is not intended as medical advice for individual conditions or treatments  Talk to your doctor, nurse or pharmacist before following any medical regimen to see if it is safe and effective for you

## 2020-09-14 NOTE — PROGRESS NOTES
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 MRN: [id number]  Unit/Bed#: -01 Encounter: [id number]    Subjective:    Patient received enema yesterday and is starting to drink the golytely today  She reports abdominal bloating and discomfort  No vomiting  No rectal bleeding  Objective:     Vitals:   Vitals:    09/14/20 0846   BP: 152/91   Pulse: 65   Resp:    Temp: 97 6 °F (36 4 °C)   SpO2: 99%   ,Body mass index is 23 91 kg/m²        Intake/Output Summary (Last 24 hours) at 9/14/2020 0928  Last data filed at 9/14/2020 0925  Gross per 24 hour   Intake 1150 ml   Output 1300 ml   Net -150 ml       Physical Exam:     General Appearance: Alert, appears stated age and cooperative  Lungs: Clear to auscultation bilaterally, no rales or rhonchi, no labored breathing/accessory muscle use  Heart: Regular rate and rhythm, S1, S2 normal, no murmur, click, rub or gallop  Abdomen: Soft, mild generalized TTP, non-distended; bowel sounds normal; no masses or no organomegaly  Extremities: No cyanosis, edema    Invasive Devices     Peripheral Intravenous Line            Peripheral IV 09/14/20 Right;Ventral (anterior) Hand less than 1 day                Lab Results:  Admission on 09/10/2020   Component Date Value    WBC 09/10/2020 11 67*    RBC 09/10/2020 3 99     Hemoglobin 09/10/2020 11 1*    Hematocrit 09/10/2020 35 2     MCV 09/10/2020 88     MCH 09/10/2020 27 8     MCHC 09/10/2020 31 5     RDW 09/10/2020 15 0     MPV 09/10/2020 9 9     Platelets 10/90/1285 294     nRBC 09/10/2020 0     Neutrophils Relative 09/10/2020 61     Immat GRANS % 09/10/2020 0     Lymphocytes Relative 09/10/2020 26     Monocytes Relative 09/10/2020 12     Eosinophils Relative 09/10/2020 1     Basophils Relative 09/10/2020 0     Neutrophils Absolute 09/10/2020 7 10     Immature Grans Absolute 09/10/2020 0 04     Lymphocytes Absolute 09/10/2020 3 05     Monocytes Absolute 09/10/2020 1 36*    Eosinophils Absolute 09/10/2020 0 07  Basophils Absolute 09/10/2020 0 05     Sodium 09/10/2020 144     Potassium 09/10/2020 2 6*    Chloride 09/10/2020 105     CO2 09/10/2020 30     ANION GAP 09/10/2020 9     BUN 09/10/2020 18     Creatinine 09/10/2020 0 77     Glucose 09/10/2020 101     Calcium 09/10/2020 9 0     AST 09/10/2020 9     ALT 09/10/2020 15     Alkaline Phosphatase 09/10/2020 133*    Total Protein 09/10/2020 6 8     Albumin 09/10/2020 3 0*    Total Bilirubin 09/10/2020 0 60     eGFR 09/10/2020 90     Lipase 09/10/2020 97     Magnesium 09/10/2020 1 9     C difficile toxin by PCR 09/11/2020 Negative     LACTIC ACID 09/10/2020 1 1     Ventricular Rate 09/10/2020 78     Atrial Rate 09/10/2020 78     ND Interval 09/10/2020 126     QRSD Interval 09/10/2020 74     QT Interval 09/10/2020 424     QTC Interval 09/10/2020 483     P Axis 09/10/2020 88     QRS Axis 09/10/2020 72     T Wave Axis 09/10/2020 53     WBC 09/11/2020 9 14     RBC 09/11/2020 3 39*    Hemoglobin 09/11/2020 9 6*    Hematocrit 09/11/2020 30 6*    MCV 09/11/2020 90     MCH 09/11/2020 28 3     MCHC 09/11/2020 31 4     RDW 09/11/2020 15 0     MPV 09/11/2020 9 9     Platelets 40/22/9886 247     nRBC 09/11/2020 0     Neutrophils Relative 09/11/2020 59     Immat GRANS % 09/11/2020 1     Lymphocytes Relative 09/11/2020 20     Monocytes Relative 09/11/2020 19*    Eosinophils Relative 09/11/2020 1     Basophils Relative 09/11/2020 0     Neutrophils Absolute 09/11/2020 5 44     Immature Grans Absolute 09/11/2020 0 05     Lymphocytes Absolute 09/11/2020 1 84     Monocytes Absolute 09/11/2020 1 72*    Eosinophils Absolute 09/11/2020 0 06     Basophils Absolute 09/11/2020 0 03     Magnesium 09/11/2020 1 8     Sodium 09/11/2020 142     Potassium 09/11/2020 3 3*    Chloride 09/11/2020 108     CO2 09/11/2020 24     ANION GAP 09/11/2020 10     BUN 09/11/2020 12     Creatinine 09/11/2020 0 52*    Glucose 09/11/2020 92     Glucose, Fasting 09/11/2020 92     Calcium 09/11/2020 8 1*    AST 09/11/2020 9     ALT 09/11/2020 12     Alkaline Phosphatase 09/11/2020 102     Total Protein 09/11/2020 5 6*    Albumin 09/11/2020 2 3*    Total Bilirubin 09/11/2020 0 40     eGFR 09/11/2020 112     WBC 09/12/2020 9 46     RBC 09/12/2020 3 61*    Hemoglobin 09/12/2020 10 1*    Hematocrit 09/12/2020 32 3*    MCV 09/12/2020 90     MCH 09/12/2020 28 0     MCHC 09/12/2020 31 3*    RDW 09/12/2020 15 0     MPV 09/12/2020 9 7     Platelets 52/00/5113 266     nRBC 09/12/2020 0     Neutrophils Relative 09/12/2020 44     Immat GRANS % 09/12/2020 1     Lymphocytes Relative 09/12/2020 31     Monocytes Relative 09/12/2020 22*    Eosinophils Relative 09/12/2020 2     Basophils Relative 09/12/2020 0     Neutrophils Absolute 09/12/2020 4 30     Immature Grans Absolute 09/12/2020 0 05     Lymphocytes Absolute 09/12/2020 2 89     Monocytes Absolute 09/12/2020 2 04*    Eosinophils Absolute 09/12/2020 0 16     Basophils Absolute 09/12/2020 0 02     Magnesium 09/12/2020 1 7     Sodium 09/12/2020 138     Potassium 09/12/2020 3 1*    Chloride 09/12/2020 106     CO2 09/12/2020 25     ANION GAP 09/12/2020 7     BUN 09/12/2020 12     Creatinine 09/12/2020 0 69     Glucose 09/12/2020 99     Calcium 09/12/2020 8 3     AST 09/12/2020 10     ALT 09/12/2020 13     Alkaline Phosphatase 09/12/2020 103     Total Protein 09/12/2020 5 6*    Albumin 09/12/2020 2 2*    Total Bilirubin 09/12/2020 0 50     eGFR 09/12/2020 102     TSH 3RD GENERATON 09/12/2020 2 128     WBC 09/13/2020 8 42     RBC 09/13/2020 3 43*    Hemoglobin 09/13/2020 9 6*    Hematocrit 09/13/2020 30 4*    MCV 09/13/2020 89     MCH 09/13/2020 28 0     MCHC 09/13/2020 31 6     RDW 09/13/2020 14 9     MPV 09/13/2020 9 8     Platelets 50/96/8468 293     nRBC 09/13/2020 0     Neutrophils Relative 09/13/2020 33*    Immat GRANS % 09/13/2020 1     Lymphocytes Relative 09/13/2020 44     Monocytes Relative 09/13/2020 17*    Eosinophils Relative 09/13/2020 4     Basophils Relative 09/13/2020 1     Neutrophils Absolute 09/13/2020 2 78     Immature Grans Absolute 09/13/2020 0 08     Lymphocytes Absolute 09/13/2020 3 81     Monocytes Absolute 09/13/2020 1 40*    Eosinophils Absolute 09/13/2020 0 31     Basophils Absolute 09/13/2020 0 04     Magnesium 09/13/2020 1 7     Sodium 09/13/2020 141     Potassium 09/13/2020 2 9*    Chloride 09/13/2020 106     CO2 09/13/2020 26     ANION GAP 09/13/2020 9     BUN 09/13/2020 6     Creatinine 09/13/2020 0 60     Glucose 09/13/2020 82     Calcium 09/13/2020 8 2*    AST 09/13/2020 11     ALT 09/13/2020 13     Alkaline Phosphatase 09/13/2020 92     Total Protein 09/13/2020 5 2*    Albumin 09/13/2020 2 0*    Total Bilirubin 09/13/2020 0 30     eGFR 09/13/2020 107     Sodium 09/14/2020 141     Potassium 09/14/2020 3 3*    Chloride 09/14/2020 109*    CO2 09/14/2020 26     ANION GAP 09/14/2020 6     BUN 09/14/2020 6     Creatinine 09/14/2020 0 64     Glucose 09/14/2020 84     Calcium 09/14/2020 8 0*    eGFR 09/14/2020 104        Imaging Studies:   I have personally reviewed pertinent imaging studies  Xr Chest Pa & Lateral    Result Date: 8/18/2020  Narrative: CHEST INDICATION:   A48 1: Legionnaires' disease  COMPARISON:  Chest radiograph from 7/26/2020 and chest CT from 7/25/2020  EXAM PERFORMED/VIEWS:  XR CHEST PA & LATERAL  DUAL ENERGY SUBTRACTION TECHNIQUE FINDINGS: Cardiomediastinal silhouette appears unremarkable  Minimal benign residual scar in the left lung with resolution of previous severe pneumonia  No effusion or pneumothorax  Osseous structures appear within normal limits for patient age  Impression: No acute cardiopulmonary disease  Minimal benign residual scar in the left lung from previous severe pneumonia   Workstation performed: [id number]     Ct Abdomen Pelvis With Contrast    Result Date: 9/10/2020  Narrative: CT ABDOMEN AND PELVIS WITH IV CONTRAST INDICATION:   diffuse abdominal pain/tenderness, distended abdomen, diarrhea  COMPARISON:  None  TECHNIQUE:  CT examination of the abdomen and pelvis was performed  Axial, sagittal, and coronal 2D reformatted images were created from the source data and submitted for interpretation  Radiation dose length product (DLP) for this visit:  436 5 mGy-cm   This examination, like all CT scans performed in the [facility], was performed utilizing techniques to minimize radiation dose exposure, including the use of iterative reconstruction and automated exposure control  IV Contrast:  100 mL of iohexol (OMNIPAQUE) Enteric Contrast:  Enteric contrast was not administered  FINDINGS: ABDOMEN LOWER CHEST:  No clinically significant abnormality identified in the visualized lower chest  LIVER/BILIARY TREE:  Unremarkable  GALLBLADDER:  No calcified gallstones  No pericholecystic inflammatory change  SPLEEN:  Unremarkable  PANCREAS:  Unremarkable  ADRENAL GLANDS:  Unremarkable  KIDNEYS/URETERS:  One or more simple renal cyst(s) is noted  Otherwise unremarkable kidneys  No hydronephrosis  STOMACH AND BOWEL:  Mural thickening involving the rectosigmoid is identified surrounding inflammatory change and a large volume of stool present  Findings may represent stercoral proctitis/colitis or other infectious or inflammatory colitis    APPENDIX:  No findings to suggest appendicitis  ABDOMINOPELVIC CAVITY:  No ascites  No pneumoperitoneum  No lymphadenopathy  VESSELS:  Unremarkable for patient's age  PELVIS REPRODUCTIVE ORGANS:  Unremarkable for patient's age  URINARY BLADDER:  Unremarkable  ABDOMINAL WALL/INGUINAL REGIONS:  Unremarkable  OSSEOUS STRUCTURES:  No acute fracture or destructive osseous lesion  Impression: Large volume of rectosigmoid stool with associated mural thickening and surrounding inflammatory change    Findings may reflect stercoral proctitis/colitis or other infectious or inflammatory colitis  Workstation performed: [id number]         Assessment & Plan    Overflow diarrhea due to severe constipation  Possible stercoral proctitis on CT Scan  Family history of colon cancer    - CT A/P showed a large volume of rectosigmoid stool with associated mural thickening and surrounding inflammatory change- findings may reflect a stercoral proctitis  - Patient received enema last night and is now drinking the GoLYTELY prep for relief  - Also, recommend a maintenance bowel regimen and after this with MiraLax 1 capful daily   - Patient will need to undergo a colonoscopy for further evaluation and screening purposes as an outpatient (or could be done as an inpatient if symptoms persist)  The patient will be seen by Dr Areli Ware PA-C  9/14/2020,9:28 AM

## 2020-09-14 NOTE — DISCHARGE INSTR - AVS FIRST PAGE
· Outpatient colonoscopy recommended  · Continue with oral hydration and avoiding constipation at home  · Continue taking potassium supplementation  · Check BMP in 2-3 days  · Call the number on your discharge papers for the GI office   · If your symptoms worsen come back to the ER

## 2020-09-14 NOTE — PLAN OF CARE
Problem: Potential for Falls  Goal: Patient will remain free of falls  Description: INTERVENTIONS:  - Assess patient frequently for physical needs  -  Identify cognitive and physical deficits and behaviors that affect risk of falls    -  [place] fall precautions as indicated by assessment   - Educate patient/family on patient safety including physical limitations  - Instruct patient to call for assistance with activity based on assessment  - Modify environment to reduce risk of injury  - Consider OT/PT consult to assist with strengthening/mobility  Outcome: Progressing     Problem: PAIN - ADULT  Goal: Verbalizes/displays adequate comfort level or baseline comfort level  Description: Interventions:  - Encourage patient to monitor pain and request assistance  - Assess pain using appropriate pain scale  - Administer analgesics based on type and severity of pain and evaluate response  - Implement non-pharmacological measures as appropriate and evaluate response  - Consider cultural and social influences on pain and pain management  - Notify physician/advanced practitioner if interventions unsuccessful or patient reports new pain  Outcome: Progressing     Problem: INFECTION - ADULT  Goal: Absence or prevention of progression during hospitalization  Description: INTERVENTIONS:  - Assess and monitor for signs and symptoms of infection  - Monitor lab/diagnostic results  - Monitor all insertion sites, i e  indwelling lines, tubes, and drains  - Monitor endotracheal if appropriate and nasal secretions for changes in amount and color  - [place] appropriate cooling/warming therapies per order  - Administer medications as ordered  - Instruct and encourage patient and family to use good hand hygiene technique  - Identify and instruct in appropriate isolation precautions for identified infection/condition  Outcome: Progressing  Goal: Absence of fever/infection during neutropenic period  Description: INTERVENTIONS:  - Monitor WBC    Outcome: Progressing     Problem: SAFETY ADULT  Goal: Patient will remain free of falls  Description: INTERVENTIONS:  - Assess patient frequently for physical needs  -  Identify cognitive and physical deficits and behaviors that affect risk of falls    -  [place] fall precautions as indicated by assessment   - Educate patient/family on patient safety including physical limitations  - Instruct patient to call for assistance with activity based on assessment  - Modify environment to reduce risk of injury  - Consider OT/PT consult to assist with strengthening/mobility  Outcome: Progressing  Goal: Maintain or return to baseline ADL function  Description: INTERVENTIONS:  -  Assess patient's ability to carry out ADLs; assess patient's baseline for ADL function and identify physical deficits which impact ability to perform ADLs (bathing, care of mouth/teeth, toileting, grooming, dressing, etc )  - Assess/evaluate cause of self-care deficits   - Assess range of motion  - Assess patient's mobility; develop plan if impaired  - Assess patient's need for assistive devices and provide as appropriate  - Encourage maximum independence but intervene and supervise when necessary  - Involve family in performance of ADLs  - Assess for home care needs following discharge   - Consider OT consult to assist with ADL evaluation and planning for discharge  - Provide patient education as appropriate  Outcome: Progressing  Goal: Maintain or return mobility status to optimal level  Description: INTERVENTIONS:  - Assess patient's baseline mobility status (ambulation, transfers, stairs, etc )    - Identify cognitive and physical deficits and behaviors that affect mobility  - Identify mobility aids required to assist with transfers and/or ambulation (gait belt, sit-to-stand, lift, walker, cane, etc )  - [place] fall precautions as indicated by assessment  - Record patient progress and toleration of activity level on Mobility SBAR; progress patient to next Phase/Stage  - Instruct patient to call for assistance with activity based on assessment  - Consider rehabilitation consult to assist with strengthening/weightbearing, etc   Outcome: Progressing     Problem: DISCHARGE PLANNING  Goal: Discharge to home or other facility with appropriate resources  Description: INTERVENTIONS:  - Identify barriers to discharge w/patient and caregiver  - Arrange for needed discharge resources and transportation as appropriate  - Identify discharge learning needs (meds, wound care, etc )  - Arrange for interpretive services to assist at discharge as needed  - Refer to Case Management Department for coordinating discharge planning if the patient needs post-hospital services based on physician/advanced practitioner order or complex needs related to functional status, cognitive ability, or social support system  Outcome: Progressing     Problem: Knowledge Deficit  Goal: Patient/family/caregiver demonstrates understanding of disease process, treatment plan, medications, and discharge instructions  Description: Complete learning assessment and assess knowledge base  Interventions:  - Provide teaching at level of understanding  - Provide teaching via preferred learning methods  Outcome: Progressing     Problem: Nutrition/Hydration-ADULT  Goal: Nutrient/Hydration intake appropriate for improving, restoring or maintaining nutritional needs  Description: Monitor and assess patient's nutrition/hydration status for malnutrition  Collaborate with interdisciplinary team and initiate plan and interventions as ordered  Monitor patient's weight and dietary intake as ordered or per policy  Utilize nutrition screening tool and intervene as necessary  Determine patient's food preferences and provide high-protein, high-caloric foods as appropriate       INTERVENTIONS:  - Monitor oral intake, urinary output, labs, and treatment plans  - Assess nutrition and hydration status and recommend course of action  - Evaluate amount of meals eaten  - Assist patient with eating if necessary   - Allow adequate time for meals  - Recommend/ encourage appropriate diets, oral nutritional supplements, and vitamin/mineral supplements  - Order, calculate, and assess calorie counts as needed  - Recommend, monitor, and adjust tube feedings and TPN/PPN based on assessed needs  - Assess need for intravenous fluids  - Provide specific nutrition/hydration education as appropriate  - Include patient/family/caregiver in decisions related to nutrition  Outcome: Progressing     Problem: Prexisting or High Potential for Compromised Skin Integrity  Goal: Skin integrity is maintained or improved  Description: INTERVENTIONS:  - Identify patients at risk for skin breakdown  - Assess and monitor skin integrity  - Assess and monitor nutrition and hydration status  - Monitor labs   - Assess for incontinence   - Turn and reposition patient  - Assist with mobility/ambulation  - Relieve pressure over bony prominences  - Avoid friction and shearing  - Provide appropriate hygiene as needed including keeping skin clean and dry  - Evaluate need for skin moisturizer/barrier cream  - Collaborate with interdisciplinary team   - Patient/family teaching  - Consider wound care consult   Outcome: Progressing

## 2020-09-14 NOTE — ASSESSMENT & PLAN NOTE
· OP colonoscopy   Cleared for DC by GI  · C diff negative, TSH normal  · Overflow incontinence  · Soap suds enema did not help for constipation  · golytely helped  · Now better  · Ready to be DC

## 2020-09-15 ENCOUNTER — TRANSITIONAL CARE MANAGEMENT (OUTPATIENT)
Dept: FAMILY MEDICINE CLINIC | Facility: CLINIC | Age: 50
End: 2020-09-15

## 2020-09-15 NOTE — UTILIZATION REVIEW
Notification of Discharge  This is a Notification of Discharge from our facility [address]  Please be advised that this patient has been discharge from our facility  Below you will find the admission and discharge date and time including the patients disposition  PRESENTATION DATE: 9/10/2020  2:53 PM  OBS ADMISSION DATE: 09/10/2020  IP ADMISSION DATE: 9/12/20 1243   DISCHARGE DATE: 9/14/2020  6:33 PM  DISPOSITION: Home/Self Care Home/Self Care   Admission Orders listed below:  Admission Orders (From admission, onward)     Ordered        09/12/20 1243  Inpatient Admission  Once         09/10/20 173  Place in Observation  Once                   Please contact the UR Department if additional information is required to close this patient's authorization/case  [address] [facility] Utilization Review Department  Main: [phone] x carefully listen to the prompts  All voicemails are confidential   [email or web address]  Send all requests for admission clinical reviews, approved or denied determinations and any other requests to dedicated fax number below belonging to the campus where the patient is receiving treatment   List of dedicated fax numbers:  [address] DENIALS (Administrative/Medical Necessity) [phone]   [address]  (Maternity/NICU/Pediatrics) [phone]   Phil Gibson [phone]   Eneida Carmichael [phone]   Ragini Head [phone]   Hettie Lips [facility] [address] [facility] [phone]   [facility]  [phone]   [address] [facility] [address]  [address] [facility] [address] [facility] [phone]

## 2020-09-17 ENCOUNTER — APPOINTMENT (OUTPATIENT)
Dept: LAB | Facility: HOSPITAL | Age: 50
End: 2020-09-17
Attending: FAMILY MEDICINE
Payer: COMMERCIAL

## 2020-09-17 DIAGNOSIS — E87.6 HYPOKALEMIA: ICD-10-CM

## 2020-09-17 LAB
ANION GAP SERPL CALCULATED.3IONS-SCNC: 10 MMOL/L (ref 4–13)
BUN SERPL-MCNC: 3 MG/DL (ref 5–25)
CALCIUM SERPL-MCNC: 9 MG/DL (ref 8.3–10.1)
CHLORIDE SERPL-SCNC: 103 MMOL/L (ref 100–108)
CO2 SERPL-SCNC: 28 MMOL/L (ref 21–32)
CREAT SERPL-MCNC: 0.8 MG/DL (ref 0.6–1.3)
GFR SERPL CREATININE-BSD FRML MDRD: 86 ML/MIN/1.73SQ M
GLUCOSE P FAST SERPL-MCNC: 92 MG/DL (ref 65–99)
POTASSIUM SERPL-SCNC: 4.5 MMOL/L (ref 3.5–5.3)
SODIUM SERPL-SCNC: 141 MMOL/L (ref 136–145)
WBC SPEC QL GRAM STN: ABNORMAL

## 2020-09-17 PROCEDURE — 36415 COLL VENOUS BLD VENIPUNCTURE: CPT

## 2020-09-17 PROCEDURE — 80048 BASIC METABOLIC PNL TOTAL CA: CPT

## 2020-09-21 LAB — MISCELLANEOUS LAB TEST RESULT: NORMAL

## 2020-09-22 ENCOUNTER — OFFICE VISIT (OUTPATIENT)
Dept: GASTROENTEROLOGY | Facility: CLINIC | Age: 50
End: 2020-09-22
Payer: COMMERCIAL

## 2020-09-22 ENCOUNTER — PREP FOR PROCEDURE (OUTPATIENT)
Dept: GASTROENTEROLOGY | Facility: CLINIC | Age: 50
End: 2020-09-22

## 2020-09-22 VITALS
TEMPERATURE: 97.8 F | DIASTOLIC BLOOD PRESSURE: 84 MMHG | HEART RATE: 69 BPM | BODY MASS INDEX: 23.53 KG/M2 | HEIGHT: 63 IN | WEIGHT: 132.8 LBS | SYSTOLIC BLOOD PRESSURE: 120 MMHG

## 2020-09-22 DIAGNOSIS — R19.8 CHANGE IN BOWEL FUNCTION: ICD-10-CM

## 2020-09-22 DIAGNOSIS — Z80.0 FAMILY HISTORY OF COLON CANCER: ICD-10-CM

## 2020-09-22 DIAGNOSIS — K62.89 PROCTITIS: Primary | ICD-10-CM

## 2020-09-22 DIAGNOSIS — Z12.11 SPECIAL SCREENING FOR MALIGNANT NEOPLASMS, COLON: Primary | ICD-10-CM

## 2020-09-22 PROCEDURE — 99213 OFFICE O/P EST LOW 20 MIN: CPT | Performed by: PHYSICIAN ASSISTANT

## 2020-09-22 PROCEDURE — 1036F TOBACCO NON-USER: CPT | Performed by: PHYSICIAN ASSISTANT

## 2020-09-22 NOTE — LETTER
September 22, 2020     Jody Das 1960    Patient: Anju Ambriz   YOB: 1970   Date of Visit: 9/22/2020       Dear Dr Robert Evans: Thank you for referring Anju Ambriz to me for evaluation  Below are my notes for this consultation  If you have questions, please do not hesitate to call me  I look forward to following your patient along with you  Sincerely,        Edna Chavez PA-C        CC: No Recipients  Snyder Running  9/22/2020 12:35 PM  Sign when Signing Visit  [facility] - Outpatient Follow-up Note  Anju Ambriz 48 y o  female MRN: [id number]  Encounter: [id number]          ASSESSMENT AND PLAN:      1  Proctitis  2  Family history of colon cancer  3  Change in bowel function  -Will plan for colonoscopy  -Will start patient on fiber supplementation as well as align probiotic   -Is long as patient continues to have a bowel movement every day there will be no need to start her on anything for constipation at the present time  ______________________________________________________________________    SUBJECTIVE:    80-year-old female who is here for hospital follow-up  Patient was seen in house by the Gastroenterology service due to fecal impaction, incontinence, and CT scan suggesting stercoral proctitis  Patient reports that 1 week prior to going to the emergency department she was having issues with incontinence  She reports that prior to this she was having normal bowel movements with no problem  Patient reports that after discharge she did continue her bowel regimen for some time but right now the only thing that she is taking his potassium and she is having a bowel movement every day  She denies any melena or rectal bleeding  She denies any abdominal pain  She denies any rectal pain  She denies any nausea or vomiting    She does report that she has a maternal grandfather the had a history of colon cancer  She has never had a colonoscopy in the past   Her weight is stable  She does not eat a very well-balanced diet  REVIEW OF SYSTEMS IS OTHERWISE NEGATIVE  Historical Information   History reviewed  No pertinent past medical history  Past Surgical History:   Procedure Laterality Date    CERVICAL BIOPSY  W/ LOOP ELECTRODE EXCISION       Social History   Social History     Substance and Sexual Activity   Alcohol Use Not Currently    Frequency: 4 or more times a week    Comment: "was drinking daily, stopped saturday"     Social History     Substance and Sexual Activity   Drug Use No     Social History     Tobacco Use   Smoking Status Current Every Day Smoker    Types: Cigarettes   Smokeless Tobacco Never Used     Family History   Problem Relation Age of Onset    Colon cancer Maternal Grandfather        Meds/Allergies       Current Outpatient Medications:     potassium chloride (K-DUR,KLOR-CON) 20 mEq tablet    Allergies   Allergen Reactions    Amoxil [Amoxicillin] Rash           Objective     Blood pressure 120/84, pulse 69, temperature 97 8 °F (36 6 °C), temperature source Temporal, height 5' 3" (1 6 m), weight 60 2 kg (132 lb 12 8 oz)  Body mass index is 23 52 kg/m²  PHYSICAL EXAM:      General Appearance:   Alert, cooperative, no distress   HEENT:   Normocephalic, atraumatic, anicteric      Neck:  Supple, symmetrical, trachea midline   Lungs:   Clear to auscultation bilaterally; no rales, rhonchi or wheezing; respirations unlabored    Heart[de-identified]   Regular rate and rhythm; no murmur, rub, or gallop  Abdomen:   Soft, non-tender, non-distended; normal bowel sounds; no masses, no organomegaly    Genitalia:   Deferred    Rectal:   Deferred    Extremities:  No cyanosis, clubbing or edema    Pulses:  2+ and symmetric    Skin:  No jaundice, rashes, or lesions    Lymph nodes:  No palpable cervical lymphadenopathy        Lab Results:   No visits with results within 1 Day(s) from this visit  Latest known visit with results is:   Appointment on 09/17/2020   Component Date Value    Sodium 09/17/2020 141     Potassium 09/17/2020 4 5     Chloride 09/17/2020 103     CO2 09/17/2020 28     ANION GAP 09/17/2020 10     BUN 09/17/2020 3*    Creatinine 09/17/2020 0 80     Glucose, Fasting 09/17/2020 92     Calcium 09/17/2020 9 0     eGFR 09/17/2020 86          Radiology Results:   Ct Abdomen Pelvis With Contrast    Result Date: 9/10/2020  Narrative: CT ABDOMEN AND PELVIS WITH IV CONTRAST INDICATION:   diffuse abdominal pain/tenderness, distended abdomen, diarrhea  COMPARISON:  None  TECHNIQUE:  CT examination of the abdomen and pelvis was performed  Axial, sagittal, and coronal 2D reformatted images were created from the source data and submitted for interpretation  Radiation dose length product (DLP) for this visit:  436 5 mGy-cm   This examination, like all CT scans performed in the [facility], was performed utilizing techniques to minimize radiation dose exposure, including the use of iterative reconstruction and automated exposure control  IV Contrast:  100 mL of iohexol (OMNIPAQUE) Enteric Contrast:  Enteric contrast was not administered  FINDINGS: ABDOMEN LOWER CHEST:  No clinically significant abnormality identified in the visualized lower chest  LIVER/BILIARY TREE:  Unremarkable  GALLBLADDER:  No calcified gallstones  No pericholecystic inflammatory change  SPLEEN:  Unremarkable  PANCREAS:  Unremarkable  ADRENAL GLANDS:  Unremarkable  KIDNEYS/URETERS:  One or more simple renal cyst(s) is noted  Otherwise unremarkable kidneys  No hydronephrosis  STOMACH AND BOWEL:  Mural thickening involving the rectosigmoid is identified surrounding inflammatory change and a large volume of stool present  Findings may represent stercoral proctitis/colitis or other infectious or inflammatory colitis    APPENDIX:  No findings to suggest appendicitis  ABDOMINOPELVIC CAVITY:  No ascites    No pneumoperitoneum  No lymphadenopathy  VESSELS:  Unremarkable for patient's age  PELVIS REPRODUCTIVE ORGANS:  Unremarkable for patient's age  URINARY BLADDER:  Unremarkable  ABDOMINAL WALL/INGUINAL REGIONS:  Unremarkable  OSSEOUS STRUCTURES:  No acute fracture or destructive osseous lesion  Impression: Large volume of rectosigmoid stool with associated mural thickening and surrounding inflammatory change  Findings may reflect stercoral proctitis/colitis or other infectious or inflammatory colitis   Workstation performed: [id number]

## 2020-09-22 NOTE — PATIENT INSTRUCTIONS
Colonoscopy   AMBULATORY CARE:   What you need to know about a colonoscopy:  A colonoscopy is a procedure to examine the inside of your colon (intestine) with a scope  A scope is a flexible tube with a small light and camera on the end  Polyps or tissue growths may be removed during your colonoscopy  What you need to do the week before your colonoscopy: You will need to stop taking medicines that contain aspirin or iron for 7 days before your colonoscopy  If you take anticoagulants, such as warfarin, ask when you should stop taking it  Make plans for someone to drive you home after your procedure  How to prepare for your colonoscopy: Your healthcare provider will have you prepare your bowels before your procedure  Your bowels will need to be empty before your procedure to allow him to clearly see your colon  You will need to do the following the day before your procedure:  · Have only clear liquids  for the entire day before your colonoscopy  Clear liquid diet includes clear fruit juices and broths, clear flavored gelatin, and hard candy  It also includes coffee, tea, carbonated beverages, and clear sports drinks  · Follow your bowel prep as directed  There are many different preparations that can be given before a colonoscopy  Some are given over 2 hours and others over 6 hours  Some are given earlier in the afternoon the day before the colonoscopy  Others are given the day before and then the morning of the colonoscopy  With any bowel prep, stay close to the bathroom  This liquid will cause your bowels to move frequently  · An enema  may be needed  Your healthcare provider may tell you to use an enema to help clean out your bowels  · Do not eat or drink anything after midnight  This will help prevent problems that can happen if you vomit while under anesthesia  What will happen during your colonoscopy:   · You will be given medicine to help you relax   You will lie on your left side and raise one or both knees toward your chest  Your healthcare provider will examine your anus and use a finger to check your rectum  You may need another enema if your bowel is not empty  The scope will be lubricated and gently placed into your anus  It will then be passed through your rectum and into your colon  Water or air will be put into your colon to help clean or expand it  This is done so your healthcare provider can see your colon clearly  · Tissue samples may be taken from the walls of your bowel and sent to a lab for tests  If you have a polyp, your healthcare provider will pass a wire loop through the scope and use it to hold the polyp  The polyp is then burned or cut off the wall of your colon  Removed polyps are sent to a lab for tests  Pictures of your colon may be taken during the procedure  The scope will be removed when the procedure is done  What will happen after your colonoscopy:   · Rest after your procedure  You may feel bloated, have some gas and abdominal discomfort  You may need to lie on your right side with a heating pad on your abdomen  You may need to take short walks to help move the gas out  Eat small meals, if you feel bloated  Do not drive or make important decisions until the day after your procedure  · You may have polyps removed  Do not take aspirin or go on long car trips for 7 days after your procedure  Ask your healthcare provider about any other limits after your procedure  Risks of a colonoscopy: You may have pain or bleeding after the scope or polyps are removed  You may also have a slow heartbeat, decreased blood pressure, or increased sweating  Your colon may tear due to the increased pressure from the scope and other instruments  This may cause bowel contents to leak out of your colon and into your abdomen  If this happens, you will need to stay in the hospital and have surgery on your colon     Seek care immediately if:   · You have a large amount of bright red blood in your bowel movements  · Your abdomen is hard and firm and you have severe pain  · You have sudden trouble breathing  Contact your healthcare provider if:   · You develop a rash or hives  · You have a fever within 24 hours of your procedure  · You have not had a bowel movement for 3 days after your procedure  · You have questions or concerns about your condition or care  Activity:   · Do not lift, strain, or run  for 3 days after your procedure  · Rest after your procedure  You have been given medicine to relax you  Do not  drive or make important decisions until the day after your procedure  Return to your normal activity as directed  · Relieve gas and discomfort from bloating  by lying on your right side with a heating pad on your abdomen  You may need to take short walks to help the gas move out  Eat small meals until bloating is relieved  If you had polyps removed: For 7 days after your procedure:  · Do not  take aspirin  · Do not  go on long car rides  Help prevent constipation:   · Eat a variety of healthy foods  Healthy foods include fruit, vegetables, whole-grain breads, low-fat dairy products, beans, lean meat, and fish  Ask if you need to be on a special diet  Your healthcare provider may recommend that you eat high-fiber foods such as cooked beans  Fiber helps you have regular bowel movements  · Drink liquids as directed  Adults should drink between 9 and 13 eight-ounce cups of liquid every day  Ask what amount is best for you  For most people, good liquids to drink are water, juice, and milk  · Exercise as directed  Talk to your healthcare provider about the best exercise plan for you  Exercise can help prevent constipation, decrease your blood pressure and improve your health  Follow up with your healthcare provider as directed:  Write down your questions so you remember to ask them during your visits     © 2017 [employer] Information is for End User's use only and may not be sold, redistributed or otherwise used for commercial purposes  All illustrations and images included in CareNotes® are the copyrighted property of A D A M , Inc  or [employer]  The above information is an  only  It is not intended as medical advice for individual conditions or treatments  Talk to your doctor, nurse or pharmacist before following any medical regimen to see if it is safe and effective for you

## 2020-09-22 NOTE — PROGRESS NOTES
[facility] - Outpatient Follow-up Note  Daylin Keating 48 y o  female MRN: [id number]  Encounter: [id number]          ASSESSMENT AND PLAN:      1  Proctitis  2  Family history of colon cancer  3  Change in bowel function  -Will plan for colonoscopy  -Will start patient on fiber supplementation as well as align probiotic   -Is long as patient continues to have a bowel movement every day there will be no need to start her on anything for constipation at the present time  ______________________________________________________________________    SUBJECTIVE:    80-year-old female who is here for hospital follow-up  Patient was seen in house by the Gastroenterology service due to fecal impaction, incontinence, and CT scan suggesting stercoral proctitis  Patient reports that 1 week prior to going to the emergency department she was having issues with incontinence  She reports that prior to this she was having normal bowel movements with no problem  Patient reports that after discharge she did continue her bowel regimen for some time but right now the only thing that she is taking his potassium and she is having a bowel movement every day  She denies any melena or rectal bleeding  She denies any abdominal pain  She denies any rectal pain  She denies any nausea or vomiting  She does report that she has a maternal grandfather the had a history of colon cancer  She has never had a colonoscopy in the past   Her weight is stable  She does not eat a very well-balanced diet  REVIEW OF SYSTEMS IS OTHERWISE NEGATIVE  Historical Information   History reviewed  No pertinent past medical history    Past Surgical History:   Procedure Laterality Date    CERVICAL BIOPSY  W/ LOOP ELECTRODE EXCISION       Social History   Social History     Substance and Sexual Activity   Alcohol Use Not Currently    Frequency: 4 or more times a week    Comment: "was drinking daily, stopped saturday"     Social History     Substance and Sexual Activity   Drug Use No     Social History     Tobacco Use   Smoking Status Current Every Day Smoker    Types: Cigarettes   Smokeless Tobacco Never Used     Family History   Problem Relation Age of Onset    Colon cancer Maternal Grandfather        Meds/Allergies       Current Outpatient Medications:     potassium chloride (K-DUR,KLOR-CON) 20 mEq tablet    Allergies   Allergen Reactions    Amoxil [Amoxicillin] Rash           Objective     Blood pressure 120/84, pulse 69, temperature 97 8 °F (36 6 °C), temperature source Temporal, height 5' 3" (1 6 m), weight 60 2 kg (132 lb 12 8 oz)  Body mass index is 23 52 kg/m²  PHYSICAL EXAM:      General Appearance:   Alert, cooperative, no distress   HEENT:   Normocephalic, atraumatic, anicteric      Neck:  Supple, symmetrical, trachea midline   Lungs:   Clear to auscultation bilaterally; no rales, rhonchi or wheezing; respirations unlabored    Heart[de-identified]   Regular rate and rhythm; no murmur, rub, or gallop  Abdomen:   Soft, non-tender, non-distended; normal bowel sounds; no masses, no organomegaly    Genitalia:   Deferred    Rectal:   Deferred    Extremities:  No cyanosis, clubbing or edema    Pulses:  2+ and symmetric    Skin:  No jaundice, rashes, or lesions    Lymph nodes:  No palpable cervical lymphadenopathy        Lab Results:   No visits with results within 1 Day(s) from this visit     Latest known visit with results is:   Appointment on 09/17/2020   Component Date Value    Sodium 09/17/2020 141     Potassium 09/17/2020 4 5     Chloride 09/17/2020 103     CO2 09/17/2020 28     ANION GAP 09/17/2020 10     BUN 09/17/2020 3*    Creatinine 09/17/2020 0 80     Glucose, Fasting 09/17/2020 92     Calcium 09/17/2020 9 0     eGFR 09/17/2020 86          Radiology Results:   Ct Abdomen Pelvis With Contrast    Result Date: 9/10/2020  Narrative: CT ABDOMEN AND PELVIS WITH IV CONTRAST INDICATION:   diffuse abdominal pain/tenderness, distended abdomen, diarrhea  COMPARISON:  None  TECHNIQUE:  CT examination of the abdomen and pelvis was performed  Axial, sagittal, and coronal 2D reformatted images were created from the source data and submitted for interpretation  Radiation dose length product (DLP) for this visit:  436 5 mGy-cm   This examination, like all CT scans performed in the [facility], was performed utilizing techniques to minimize radiation dose exposure, including the use of iterative reconstruction and automated exposure control  IV Contrast:  100 mL of iohexol (OMNIPAQUE) Enteric Contrast:  Enteric contrast was not administered  FINDINGS: ABDOMEN LOWER CHEST:  No clinically significant abnormality identified in the visualized lower chest  LIVER/BILIARY TREE:  Unremarkable  GALLBLADDER:  No calcified gallstones  No pericholecystic inflammatory change  SPLEEN:  Unremarkable  PANCREAS:  Unremarkable  ADRENAL GLANDS:  Unremarkable  KIDNEYS/URETERS:  One or more simple renal cyst(s) is noted  Otherwise unremarkable kidneys  No hydronephrosis  STOMACH AND BOWEL:  Mural thickening involving the rectosigmoid is identified surrounding inflammatory change and a large volume of stool present  Findings may represent stercoral proctitis/colitis or other infectious or inflammatory colitis    APPENDIX:  No findings to suggest appendicitis  ABDOMINOPELVIC CAVITY:  No ascites  No pneumoperitoneum  No lymphadenopathy  VESSELS:  Unremarkable for patient's age  PELVIS REPRODUCTIVE ORGANS:  Unremarkable for patient's age  URINARY BLADDER:  Unremarkable  ABDOMINAL WALL/INGUINAL REGIONS:  Unremarkable  OSSEOUS STRUCTURES:  No acute fracture or destructive osseous lesion  Impression: Large volume of rectosigmoid stool with associated mural thickening and surrounding inflammatory change  Findings may reflect stercoral proctitis/colitis or other infectious or inflammatory colitis   Workstation performed: [id number]

## 2020-09-23 ENCOUNTER — OFFICE VISIT (OUTPATIENT)
Dept: FAMILY MEDICINE CLINIC | Facility: CLINIC | Age: 50
End: 2020-09-23
Payer: COMMERCIAL

## 2020-09-23 VITALS
HEART RATE: 80 BPM | DIASTOLIC BLOOD PRESSURE: 70 MMHG | TEMPERATURE: 97.5 F | BODY MASS INDEX: 23.74 KG/M2 | WEIGHT: 134 LBS | OXYGEN SATURATION: 98 % | HEIGHT: 63 IN | SYSTOLIC BLOOD PRESSURE: 122 MMHG

## 2020-09-23 DIAGNOSIS — D64.9 ANEMIA, UNSPECIFIED TYPE: ICD-10-CM

## 2020-09-23 DIAGNOSIS — E87.6 HYPOKALEMIA: ICD-10-CM

## 2020-09-23 DIAGNOSIS — R19.7 DIARRHEA, UNSPECIFIED TYPE: ICD-10-CM

## 2020-09-23 DIAGNOSIS — K52.9 COLITIS: Primary | ICD-10-CM

## 2020-09-23 PROBLEM — F17.200 SMOKER: Status: RESOLVED | Noted: 2018-11-08 | Resolved: 2020-09-23

## 2020-09-23 PROBLEM — R65.21 SEPTIC SHOCK (HCC): Status: RESOLVED | Noted: 2020-07-30 | Resolved: 2020-09-23

## 2020-09-23 PROBLEM — A48.1 LEGIONELLA PNEUMONIA (HCC): Status: RESOLVED | Noted: 2020-07-25 | Resolved: 2020-09-23

## 2020-09-23 PROBLEM — A41.9 SEPTIC SHOCK (HCC): Status: RESOLVED | Noted: 2020-07-30 | Resolved: 2020-09-23

## 2020-09-23 PROBLEM — J96.01 ACUTE RESPIRATORY FAILURE WITH HYPOXIA (HCC): Status: RESOLVED | Noted: 2020-07-25 | Resolved: 2020-09-23

## 2020-09-23 PROBLEM — G93.40 ENCEPHALOPATHY: Status: RESOLVED | Noted: 2020-07-30 | Resolved: 2020-09-23

## 2020-09-23 PROCEDURE — 99496 TRANSJ CARE MGMT HIGH F2F 7D: CPT | Performed by: FAMILY MEDICINE

## 2020-09-23 NOTE — PROGRESS NOTES
Assessment/Plan:         Problem List Items Addressed This Visit        Digestive    Colitis - Primary     Follow-up with Gastroenterology            Other    Hypokalemia    Anemia     Repeat CBC next week         Relevant Orders    CBC and differential    Iron    Diarrhea            Subjective:      Patient ID: Miquel Hassan is a 48 y o  female  Patient comes in for hospital follow-up  She has severe constipation than diarrhea  She developed anemia and hypokalemia while in the hospital   Her hypokalemia has resolved  CT scan of abdomen is consistent with colitis  She has colonoscopy planned for next week  TCM Call (since 8/23/2020)     Date and time call was made  9/15/2020  3:08 PM    Hospital care reviewed  Records reviewed    Patient was hospitialized at  [facility]    Date of Admission  09/10/20    Date of discharge  09/14/20    Diagnosis  Colitis     Disposition  Home    Were the patients medications reviewed and updated  Yes    Current Symptoms  None      TCM Call (since 8/23/2020)     Post hospital issues  None    Should patient be enrolled in anticoag monitoring? No    Scheduled for follow up? Yes    Did you obtain your prescribed medications  Yes    Do you need help managing your prescriptions or medications  No    Is transportation to your appointment needed  No    I have advised the patient to call PCP with any new or worsening symptoms  Renée Childers/nick  Living Arrangements  Family members    Support System  Family    The type of support provided  Emotional    Do you have social support  Yes, as much as I need    Are you recieving any outpatient services  No    Are you recieving home care services  No    Are you using any community resources  No    Current waiver services  No    Have you fallen in the last 12 months  No    Interperter language line needed  No    Counseling  Patient    Counseling topics  patient and family education; Importance of RX compliance;  Activities of daily living          The following portions of the patient's history were reviewed and updated as appropriate:   She has no past medical history on file  ,  does not have any pertinent problems on file  ,   has a past surgical history that includes Cervical biopsy w/ loop electrode excision  ,  family history includes Colon cancer in her maternal grandfather  ,   reports that she has been smoking cigarettes  She has never used smokeless tobacco  She reports previous alcohol use  She reports that she does not use drugs  ,  is allergic to amoxil [amoxicillin]     Current Outpatient Medications   Medication Sig Dispense Refill    potassium chloride (K-DUR,KLOR-CON) 20 mEq tablet Take 1 tablet (20 mEq total) by mouth 2 (two) times a day for 20 days 40 tablet 0     No current facility-administered medications for this visit  Review of Systems   Constitutional: Negative  Respiratory: Negative  Cardiovascular: Negative  Gastrointestinal: Positive for abdominal pain  Objective:  Vitals:    09/23/20 1005   BP: 122/70   Pulse: 80   Temp: 97 5 °F (36 4 °C)   SpO2: 98%   Weight: 60 8 kg (134 lb)   Height: 5' 3" (1 6 m)     Body mass index is 23 74 kg/m²  Physical Exam  Constitutional:       Appearance: Normal appearance  She is well-developed  HENT:      Head: Normocephalic and atraumatic  Right Ear: External ear normal       Left Ear: External ear normal    Eyes:      Pupils: Pupils are equal, round, and reactive to light  Neck:      Musculoskeletal: Neck supple  Thyroid: No thyromegaly  Cardiovascular:      Rate and Rhythm: Normal rate and regular rhythm  Heart sounds: Normal heart sounds  Pulmonary:      Effort: Pulmonary effort is normal       Breath sounds: Normal breath sounds  Abdominal:      General: Bowel sounds are normal       Palpations: Abdomen is soft  Comments: Mild tenderness right lower quadrant   Musculoskeletal: Normal range of motion     Skin:     General: Skin is warm and dry  Neurological:      Mental Status: She is alert and oriented to person, place, and time

## 2020-09-24 NOTE — ASSESSMENT & PLAN NOTE
Fax received from [facility]. [place] for Nortriptyline 10mg Cap approved generic up to 30 capsules per 30 days. Tracking ID: [id number]     · Suspect toxic metabolic  · CT head negative for acute intracranial abnormality  · Frequent sedation breaks/neuro checks  · Delirium precautions  Regulate sleep-wake cycles  Daily CAM ICU  Speaking Coherently

## 2020-09-28 ENCOUNTER — ANESTHESIA EVENT (OUTPATIENT)
Dept: GASTROENTEROLOGY | Facility: HOSPITAL | Age: 50
End: 2020-09-28

## 2020-09-29 ENCOUNTER — ANESTHESIA (OUTPATIENT)
Dept: GASTROENTEROLOGY | Facility: HOSPITAL | Age: 50
End: 2020-09-29

## 2020-09-29 ENCOUNTER — HOSPITAL ENCOUNTER (OUTPATIENT)
Dept: GASTROENTEROLOGY | Facility: HOSPITAL | Age: 50
Setting detail: OUTPATIENT SURGERY
Discharge: HOME | End: 2020-09-29
Attending: INTERNAL MEDICINE | Admitting: INTERNAL MEDICINE
Payer: COMMERCIAL

## 2020-09-29 VITALS
TEMPERATURE: 98.2 F | HEART RATE: 78 BPM | DIASTOLIC BLOOD PRESSURE: 96 MMHG | RESPIRATION RATE: 22 BRPM | OXYGEN SATURATION: 98 % | BODY MASS INDEX: 23.83 KG/M2 | HEIGHT: 63 IN | SYSTOLIC BLOOD PRESSURE: 162 MMHG | WEIGHT: 134.48 LBS

## 2020-09-29 VITALS — HEART RATE: 65 BPM

## 2020-09-29 DIAGNOSIS — Z12.11 SPECIAL SCREENING FOR MALIGNANT NEOPLASMS, COLON: ICD-10-CM

## 2020-09-29 PROCEDURE — 45385 COLONOSCOPY W/LESION REMOVAL: CPT | Performed by: INTERNAL MEDICINE

## 2020-09-29 PROCEDURE — 88305 TISSUE EXAM BY PATHOLOGIST: CPT | Performed by: PATHOLOGY

## 2020-09-29 RX ORDER — METOCLOPRAMIDE HYDROCHLORIDE 5 MG/ML
10 INJECTION INTRAMUSCULAR; INTRAVENOUS ONCE
Status: COMPLETED | OUTPATIENT
Start: 2020-09-29 | End: 2020-09-29

## 2020-09-29 RX ORDER — SODIUM CHLORIDE, SODIUM LACTATE, POTASSIUM CHLORIDE, CALCIUM CHLORIDE 600; 310; 30; 20 MG/100ML; MG/100ML; MG/100ML; MG/100ML
INJECTION, SOLUTION INTRAVENOUS CONTINUOUS PRN
Status: DISCONTINUED | OUTPATIENT
Start: 2020-09-29 | End: 2020-09-29

## 2020-09-29 RX ORDER — SODIUM CHLORIDE, SODIUM LACTATE, POTASSIUM CHLORIDE, CALCIUM CHLORIDE 600; 310; 30; 20 MG/100ML; MG/100ML; MG/100ML; MG/100ML
125 INJECTION, SOLUTION INTRAVENOUS CONTINUOUS
Status: DISCONTINUED | OUTPATIENT
Start: 2020-09-29 | End: 2020-10-03 | Stop reason: HOSPADM

## 2020-09-29 RX ORDER — LIDOCAINE HYDROCHLORIDE 10 MG/ML
INJECTION, SOLUTION EPIDURAL; INFILTRATION; INTRACAUDAL; PERINEURAL AS NEEDED
Status: DISCONTINUED | OUTPATIENT
Start: 2020-09-29 | End: 2020-09-29

## 2020-09-29 RX ORDER — PROPOFOL 10 MG/ML
INJECTION, EMULSION INTRAVENOUS AS NEEDED
Status: DISCONTINUED | OUTPATIENT
Start: 2020-09-29 | End: 2020-09-29

## 2020-09-29 RX ADMIN — SODIUM CHLORIDE, SODIUM LACTATE, POTASSIUM CHLORIDE, AND CALCIUM CHLORIDE: .6; .31; .03; .02 INJECTION, SOLUTION INTRAVENOUS at 09:00

## 2020-09-29 RX ADMIN — PROPOFOL 50 MG: 10 INJECTION, EMULSION INTRAVENOUS at 10:10

## 2020-09-29 RX ADMIN — PROPOFOL 50 MG: 10 INJECTION, EMULSION INTRAVENOUS at 10:23

## 2020-09-29 RX ADMIN — PROPOFOL 50 MG: 10 INJECTION, EMULSION INTRAVENOUS at 10:16

## 2020-09-29 RX ADMIN — PROPOFOL 50 MG: 10 INJECTION, EMULSION INTRAVENOUS at 10:19

## 2020-09-29 RX ADMIN — PROPOFOL 50 MG: 10 INJECTION, EMULSION INTRAVENOUS at 10:31

## 2020-09-29 RX ADMIN — LIDOCAINE HYDROCHLORIDE 50 MG: 10 INJECTION, SOLUTION EPIDURAL; INFILTRATION; INTRACAUDAL; PERINEURAL at 10:07

## 2020-09-29 RX ADMIN — PROPOFOL 50 MG: 10 INJECTION, EMULSION INTRAVENOUS at 10:13

## 2020-09-29 RX ADMIN — PROPOFOL 100 MG: 10 INJECTION, EMULSION INTRAVENOUS at 10:07

## 2020-09-29 RX ADMIN — PROPOFOL 50 MG: 10 INJECTION, EMULSION INTRAVENOUS at 10:26

## 2020-09-29 RX ADMIN — METOCLOPRAMIDE HYDROCHLORIDE 10 MG: 5 INJECTION INTRAMUSCULAR; INTRAVENOUS at 10:56

## 2020-09-29 NOTE — ANESTHESIA POSTPROCEDURE EVALUATION
Post-Op Assessment Note    CV Status:  Stable  Pain Score: 0    Pain management: adequate     Mental Status:  Alert and awake   Hydration Status:  Euvolemic   PONV Controlled:  Controlled   Airway Patency:  Patent      Post Op Vitals Reviewed: Yes      Staff: Anesthesiologist, CRNA   Comments: VSS        No complications documented      BP      Temp     Pulse     Resp      SpO2

## 2020-09-29 NOTE — ANESTHESIA PREPROCEDURE EVALUATION
Procedure:  COLONOSCOPY    Relevant Problems   CARDIO   (+) Hyperlipidemia      HEMATOLOGY   (+) Anemia        Physical Exam    Airway    Mallampati score: II  TM Distance: >3 FB  Neck ROM: full     Dental   No notable dental hx     Cardiovascular  Rhythm: regular, Rate: normal, Cardiovascular exam normal    Pulmonary  Pulmonary exam normal Breath sounds clear to auscultation,     Other Findings        Anesthesia Plan  ASA Score- 2     Anesthesia Type- IV sedation with anesthesia with ASA Monitors  Additional Monitors:   Airway Plan:           Plan Factors-Exercise tolerance (METS): >4 METS  Chart reviewed  Patient summary reviewed  Patient is a current smoker  Patient not instructed to abstain from smoking on day of procedure  Patient did not smoke on day of surgery  Obstructive sleep apnea risk education given perioperatively  Induction-     Postoperative Plan- Plan for postoperative opioid use  Informed Consent- Anesthetic plan and risks discussed with patient  I personally reviewed this patient with the CRNA  Discussed and agreed on the Anesthesia Plan with the [facility] Pardo

## 2020-10-29 ENCOUNTER — OFFICE VISIT (OUTPATIENT)
Dept: GASTROENTEROLOGY | Facility: CLINIC | Age: 50
End: 2020-10-29
Payer: COMMERCIAL

## 2020-10-29 VITALS
SYSTOLIC BLOOD PRESSURE: 140 MMHG | BODY MASS INDEX: 24.73 KG/M2 | WEIGHT: 139.6 LBS | DIASTOLIC BLOOD PRESSURE: 92 MMHG | HEIGHT: 63 IN | TEMPERATURE: 99.4 F | HEART RATE: 86 BPM

## 2020-10-29 DIAGNOSIS — K62.89 PROCTITIS: ICD-10-CM

## 2020-10-29 DIAGNOSIS — Z86.010 HISTORY OF COLON POLYPS: Primary | ICD-10-CM

## 2020-10-29 PROCEDURE — 3008F BODY MASS INDEX DOCD: CPT | Performed by: PHYSICIAN ASSISTANT

## 2020-10-29 PROCEDURE — 99213 OFFICE O/P EST LOW 20 MIN: CPT | Performed by: PHYSICIAN ASSISTANT

## 2021-02-01 PROBLEM — K51.20 ULCERATIVE (CHRONIC) PROCTITIS (HCC): Status: ACTIVE | Noted: 2020-09-10

## 2021-02-04 ENCOUNTER — OFFICE VISIT (OUTPATIENT)
Dept: FAMILY MEDICINE CLINIC | Facility: CLINIC | Age: 51
End: 2021-02-04
Payer: COMMERCIAL

## 2021-02-04 VITALS
HEIGHT: 63 IN | OXYGEN SATURATION: 98 % | TEMPERATURE: 98 F | DIASTOLIC BLOOD PRESSURE: 88 MMHG | WEIGHT: 147 LBS | HEART RATE: 84 BPM | BODY MASS INDEX: 26.05 KG/M2 | SYSTOLIC BLOOD PRESSURE: 122 MMHG

## 2021-02-04 DIAGNOSIS — G47.00 INSOMNIA, UNSPECIFIED TYPE: ICD-10-CM

## 2021-02-04 DIAGNOSIS — Z72.0 TOBACCO USE: ICD-10-CM

## 2021-02-04 DIAGNOSIS — K51.20 CHRONIC ULCERATIVE PROCTITIS WITHOUT COMPLICATIONS (HCC): ICD-10-CM

## 2021-02-04 DIAGNOSIS — E78.5 HYPERLIPIDEMIA, UNSPECIFIED HYPERLIPIDEMIA TYPE: ICD-10-CM

## 2021-02-04 DIAGNOSIS — Z00.00 HEALTH MAINTENANCE EXAMINATION: ICD-10-CM

## 2021-02-04 DIAGNOSIS — Z12.31 BREAST CANCER SCREENING BY MAMMOGRAM: Primary | ICD-10-CM

## 2021-02-04 DIAGNOSIS — D64.9 ANEMIA, UNSPECIFIED TYPE: ICD-10-CM

## 2021-02-04 PROBLEM — K59.00 CONSTIPATION: Status: RESOLVED | Noted: 2020-08-11 | Resolved: 2021-02-04

## 2021-02-04 PROBLEM — R19.7 DIARRHEA: Status: RESOLVED | Noted: 2020-09-10 | Resolved: 2021-02-04

## 2021-02-04 PROCEDURE — 3725F SCREEN DEPRESSION PERFORMED: CPT | Performed by: FAMILY MEDICINE

## 2021-02-04 PROCEDURE — 3008F BODY MASS INDEX DOCD: CPT | Performed by: FAMILY MEDICINE

## 2021-02-04 PROCEDURE — 99396 PREV VISIT EST AGE 40-64: CPT | Performed by: FAMILY MEDICINE

## 2021-02-04 PROCEDURE — 4004F PT TOBACCO SCREEN RCVD TLK: CPT | Performed by: FAMILY MEDICINE

## 2021-02-04 RX ORDER — HYDROCHLOROTHIAZIDE 50 MG/1
1 TABLET ORAL DAILY
COMMUNITY

## 2021-02-04 RX ORDER — FERROUS SULFATE 325(65) MG
325 TABLET ORAL
COMMUNITY

## 2021-02-04 RX ORDER — ZOLPIDEM TARTRATE 12.5 MG/1
12.5 TABLET, FILM COATED, EXTENDED RELEASE ORAL
Qty: 30 TABLET | Refills: 5 | Status: SHIPPED | OUTPATIENT
Start: 2021-02-04 | End: 2021-07-06 | Stop reason: SDUPTHER

## 2021-02-04 RX ORDER — MULTIVITAMIN
1 TABLET ORAL DAILY
COMMUNITY

## 2021-02-04 NOTE — PROGRESS NOTES
BMI Counseling: Body mass index is 26 04 kg/m²  The BMI is above normal  Nutrition recommendations include decreasing portion sizes and moderation in carbohydrate intake  Exercise recommendations include exercising 3-5 times per week  No pharmacotherapy was ordered  Assessment/Plan:    Return visit in 1 year for annual physical   We will call with results  Of her blood tests       Problem List Items Addressed This Visit        Digestive    Ulcerative (chronic) proctitis ([address] )       Other    Health maintenance examination    Insomnia    Relevant Medications    zolpidem (AMBIEN CR) 12 5 MG CR tablet    Hyperlipidemia    Relevant Orders    Comprehensive metabolic panel    Lipid panel    Anemia    Relevant Medications    ferrous sulfate 325 (65 Fe) mg tablet    Other Relevant Orders    CBC and differential    Iron Panel (Includes Ferritin, Iron Sat%, Iron, and TIBC)    Tobacco use      Smoking cessation recommended           Other Visit Diagnoses     Breast cancer screening by mammogram    -  Primary    Relevant Orders    Mammo screening bilateral w 3d & cad            Subjective:      Patient ID: Magalis oJ is a 46 y o  female  Patient comes in for a checkup  She was hospitalized last July for ulcerative colitis  She developed hypokalemia and anemia  Her symptoms are now much improved  She is taking iron supplementation  She also complains of insomnia and difficulty staying asleep  She had been on Ambien in the past for this with good results        The following portions of the patient's history were reviewed and updated as appropriate:   Past Medical History:  She has a past medical history of Hypokalemia ,  _______________________________________________________________________  Medical Problems:  does not have any pertinent problems on file ,  _______________________________________________________________________  Past Surgical History:   has a past surgical history that includes Cervical biopsy w/ loop electrode excision  ,  _______________________________________________________________________  Family History:  family history includes Colon cancer in her maternal grandfather ,  _______________________________________________________________________  Social History:   reports that she has been smoking cigarettes  She started smoking about 6 months ago  She has been smoking about 0 50 packs per day  She has never used smokeless tobacco  She reports previous alcohol use of about 3 0 standard drinks of alcohol per week  She reports that she does not use drugs  ,  _______________________________________________________________________  Allergies:  is allergic to amoxil [amoxicillin]     _______________________________________________________________________  Current Outpatient Medications   Medication Sig Dispense Refill    Calcium Carb-Cholecalciferol (Calcium 500/D) 500-400 MG-UNIT CHEW Chew 1 tablet daily      ferrous sulfate 325 (65 Fe) mg tablet Take 325 mg by mouth daily with breakfast      Multiple Vitamin (multivitamin) tablet Take 1 tablet by mouth daily      Potassium 99 MG TABS Take 1 tablet by mouth daily      Probiotic Product (ALIGN PO) Take 1 capsule by mouth daily      potassium chloride (K-DUR,KLOR-CON) 20 mEq tablet Take 1 tablet (20 mEq total) by mouth 2 (two) times a day for 20 days (Patient not taking: Reported on 2/4/2021) 40 tablet 0    zolpidem (AMBIEN CR) 12 5 MG CR tablet Take 1 tablet (12 5 mg total) by mouth daily at bedtime as needed for sleep 30 tablet 5     No current facility-administered medications for this visit       _______________________________________________________________________  Review of Systems   Constitutional: Negative  Respiratory: Negative  Cardiovascular: Negative  Gastrointestinal: Negative  Psychiatric/Behavioral: Positive for sleep disturbance           Objective:  Vitals:    02/04/21 1032   BP: 122/88   BP Location: Left arm   Patient Position: Sitting   Cuff Size: Adult   Pulse: 84   Temp: 98 °F (36 7 °C)   TempSrc: Tympanic   SpO2: 98%   Weight: 66 7 kg (147 lb)   Height: 5' 3" (1 6 m)     Body mass index is 26 04 kg/m²  Physical Exam  Constitutional:       Appearance: Normal appearance  She is well-developed  HENT:      Head: Normocephalic and atraumatic  Right Ear: Tympanic membrane, ear canal and external ear normal       Left Ear: Tympanic membrane, ear canal and external ear normal    Eyes:      Pupils: Pupils are equal, round, and reactive to light  Neck:      Musculoskeletal: Neck supple  Thyroid: No thyromegaly  Cardiovascular:      Rate and Rhythm: Normal rate and regular rhythm  Heart sounds: Normal heart sounds  Pulmonary:      Effort: Pulmonary effort is normal       Breath sounds: Normal breath sounds  Abdominal:      Palpations: Abdomen is soft  Musculoskeletal: Normal range of motion  Lymphadenopathy:      Cervical: No cervical adenopathy  Skin:     General: Skin is warm and dry  Neurological:      Mental Status: She is alert and oriented to person, place, and time

## 2021-03-25 ENCOUNTER — APPOINTMENT (OUTPATIENT)
Dept: LAB | Facility: HOSPITAL | Age: 51
End: 2021-03-25
Attending: FAMILY MEDICINE
Payer: COMMERCIAL

## 2021-03-25 DIAGNOSIS — E78.5 HYPERLIPIDEMIA, UNSPECIFIED HYPERLIPIDEMIA TYPE: ICD-10-CM

## 2021-03-25 DIAGNOSIS — D64.9 ANEMIA, UNSPECIFIED TYPE: ICD-10-CM

## 2021-03-25 LAB
ALBUMIN SERPL BCP-MCNC: 3.6 G/DL (ref 3.5–5)
ALP SERPL-CCNC: 120 U/L (ref 46–116)
ALT SERPL W P-5'-P-CCNC: 32 U/L (ref 12–78)
ANION GAP SERPL CALCULATED.3IONS-SCNC: 9 MMOL/L (ref 4–13)
AST SERPL W P-5'-P-CCNC: 17 U/L (ref 5–45)
BASOPHILS # BLD AUTO: 0.05 THOUSANDS/ΜL (ref 0–0.1)
BASOPHILS NFR BLD AUTO: 0 % (ref 0–1)
BILIRUB SERPL-MCNC: 0.66 MG/DL (ref 0.2–1)
BUN SERPL-MCNC: 6 MG/DL (ref 5–25)
CALCIUM SERPL-MCNC: 8.7 MG/DL (ref 8.3–10.1)
CHLORIDE SERPL-SCNC: 103 MMOL/L (ref 100–108)
CHOLEST SERPL-MCNC: 230 MG/DL (ref 50–200)
CO2 SERPL-SCNC: 28 MMOL/L (ref 21–32)
CREAT SERPL-MCNC: 0.83 MG/DL (ref 0.6–1.3)
EOSINOPHIL # BLD AUTO: 0.08 THOUSAND/ΜL (ref 0–0.61)
EOSINOPHIL NFR BLD AUTO: 1 % (ref 0–6)
ERYTHROCYTE [DISTWIDTH] IN BLOOD BY AUTOMATED COUNT: 13.9 % (ref 11.6–15.1)
FERRITIN SERPL-MCNC: 140 NG/ML (ref 8–388)
GFR SERPL CREATININE-BSD FRML MDRD: 82 ML/MIN/1.73SQ M
GLUCOSE P FAST SERPL-MCNC: 100 MG/DL (ref 65–99)
HCT VFR BLD AUTO: 43.8 % (ref 34.8–46.1)
HDLC SERPL-MCNC: 69 MG/DL
HGB BLD-MCNC: 14.2 G/DL (ref 11.5–15.4)
IMM GRANULOCYTES # BLD AUTO: 0.04 THOUSAND/UL (ref 0–0.2)
IMM GRANULOCYTES NFR BLD AUTO: 0 % (ref 0–2)
IRON SATN MFR SERPL: 20 %
IRON SERPL-MCNC: 61 UG/DL (ref 50–170)
LDLC SERPL CALC-MCNC: 122 MG/DL (ref 0–100)
LYMPHOCYTES # BLD AUTO: 3.23 THOUSANDS/ΜL (ref 0.6–4.47)
LYMPHOCYTES NFR BLD AUTO: 28 % (ref 14–44)
MCH RBC QN AUTO: 29.3 PG (ref 26.8–34.3)
MCHC RBC AUTO-ENTMCNC: 32.4 G/DL (ref 31.4–37.4)
MCV RBC AUTO: 91 FL (ref 82–98)
MONOCYTES # BLD AUTO: 0.81 THOUSAND/ΜL (ref 0.17–1.22)
MONOCYTES NFR BLD AUTO: 7 % (ref 4–12)
NEUTROPHILS # BLD AUTO: 7.5 THOUSANDS/ΜL (ref 1.85–7.62)
NEUTS SEG NFR BLD AUTO: 64 % (ref 43–75)
NONHDLC SERPL-MCNC: 161 MG/DL
NRBC BLD AUTO-RTO: 0 /100 WBCS
PLATELET # BLD AUTO: 329 THOUSANDS/UL (ref 149–390)
PMV BLD AUTO: 10.5 FL (ref 8.9–12.7)
POTASSIUM SERPL-SCNC: 3.6 MMOL/L (ref 3.5–5.3)
PROT SERPL-MCNC: 7.2 G/DL (ref 6.4–8.2)
RBC # BLD AUTO: 4.84 MILLION/UL (ref 3.81–5.12)
SODIUM SERPL-SCNC: 140 MMOL/L (ref 136–145)
TIBC SERPL-MCNC: 308 UG/DL (ref 250–450)
TRIGL SERPL-MCNC: 193 MG/DL
WBC # BLD AUTO: 11.71 THOUSAND/UL (ref 4.31–10.16)

## 2021-03-25 PROCEDURE — 36415 COLL VENOUS BLD VENIPUNCTURE: CPT

## 2021-03-25 PROCEDURE — 83540 ASSAY OF IRON: CPT

## 2021-03-25 PROCEDURE — 80053 COMPREHEN METABOLIC PANEL: CPT

## 2021-03-25 PROCEDURE — 85025 COMPLETE CBC W/AUTO DIFF WBC: CPT

## 2021-03-25 PROCEDURE — 83550 IRON BINDING TEST: CPT

## 2021-03-25 PROCEDURE — 80061 LIPID PANEL: CPT

## 2021-03-25 PROCEDURE — 82728 ASSAY OF FERRITIN: CPT

## 2021-03-26 DIAGNOSIS — E78.5 HYPERLIPIDEMIA, UNSPECIFIED HYPERLIPIDEMIA TYPE: Primary | ICD-10-CM

## 2021-03-26 DIAGNOSIS — D72.829 LEUKOCYTOSIS, UNSPECIFIED TYPE: ICD-10-CM

## 2021-03-26 DIAGNOSIS — R73.9 ELEVATED BLOOD SUGAR: ICD-10-CM

## 2021-03-26 RX ORDER — ROSUVASTATIN CALCIUM 10 MG/1
10 TABLET, COATED ORAL DAILY
Qty: 30 TABLET | Refills: 5 | Status: SHIPPED | OUTPATIENT
Start: 2021-03-26 | End: 2021-09-23

## 2021-06-03 ENCOUNTER — TELEPHONE (OUTPATIENT)
Dept: FAMILY MEDICINE CLINIC | Facility: CLINIC | Age: 51
End: 2021-06-03

## 2021-06-28 ENCOUNTER — RA CDI HCC (OUTPATIENT)
Dept: OTHER | Facility: HOSPITAL | Age: 51
End: 2021-06-28

## 2021-06-28 NOTE — PROGRESS NOTES
[facility]  coding opportunities          Chart reviewed, no opportunity found: CHART REVIEWED, NO OPPORTUNITY FOUND                     Patients insurance company: [employer] (Medicare Advantage and Commercial)

## 2021-07-05 ENCOUNTER — APPOINTMENT (OUTPATIENT)
Dept: LAB | Facility: HOSPITAL | Age: 51
End: 2021-07-05
Attending: FAMILY MEDICINE
Payer: COMMERCIAL

## 2021-07-05 DIAGNOSIS — D64.9 ANEMIA, UNSPECIFIED TYPE: ICD-10-CM

## 2021-07-05 DIAGNOSIS — R73.9 ELEVATED BLOOD SUGAR: ICD-10-CM

## 2021-07-05 DIAGNOSIS — D72.829 LEUKOCYTOSIS, UNSPECIFIED TYPE: ICD-10-CM

## 2021-07-05 DIAGNOSIS — E78.5 HYPERLIPIDEMIA, UNSPECIFIED HYPERLIPIDEMIA TYPE: ICD-10-CM

## 2021-07-05 LAB
ALBUMIN SERPL BCP-MCNC: 3.9 G/DL (ref 3.5–5)
ALP SERPL-CCNC: 127 U/L (ref 46–116)
ALT SERPL W P-5'-P-CCNC: 46 U/L (ref 12–78)
ANION GAP SERPL CALCULATED.3IONS-SCNC: 10 MMOL/L (ref 4–13)
AST SERPL W P-5'-P-CCNC: 36 U/L (ref 5–45)
BASOPHILS # BLD AUTO: 0.07 THOUSANDS/ΜL (ref 0–0.1)
BASOPHILS NFR BLD AUTO: 1 % (ref 0–1)
BILIRUB SERPL-MCNC: 1.08 MG/DL (ref 0.2–1)
BUN SERPL-MCNC: 8 MG/DL (ref 5–25)
CALCIUM SERPL-MCNC: 9.5 MG/DL (ref 8.3–10.1)
CHLORIDE SERPL-SCNC: 102 MMOL/L (ref 100–108)
CHOLEST SERPL-MCNC: 217 MG/DL (ref 50–200)
CO2 SERPL-SCNC: 27 MMOL/L (ref 21–32)
CREAT SERPL-MCNC: 0.78 MG/DL (ref 0.6–1.3)
EOSINOPHIL # BLD AUTO: 0.16 THOUSAND/ΜL (ref 0–0.61)
EOSINOPHIL NFR BLD AUTO: 2 % (ref 0–6)
ERYTHROCYTE [DISTWIDTH] IN BLOOD BY AUTOMATED COUNT: 14.9 % (ref 11.6–15.1)
GFR SERPL CREATININE-BSD FRML MDRD: 88 ML/MIN/1.73SQ M
GLUCOSE P FAST SERPL-MCNC: 100 MG/DL (ref 65–99)
HCT VFR BLD AUTO: 44.8 % (ref 34.8–46.1)
HDLC SERPL-MCNC: 101 MG/DL
HGB BLD-MCNC: 14.6 G/DL (ref 11.5–15.4)
IMM GRANULOCYTES # BLD AUTO: 0.03 THOUSAND/UL (ref 0–0.2)
IMM GRANULOCYTES NFR BLD AUTO: 0 % (ref 0–2)
LDLC SERPL CALC-MCNC: 84 MG/DL (ref 0–100)
LYMPHOCYTES # BLD AUTO: 3.06 THOUSANDS/ΜL (ref 0.6–4.47)
LYMPHOCYTES NFR BLD AUTO: 34 % (ref 14–44)
MCH RBC QN AUTO: 29 PG (ref 26.8–34.3)
MCHC RBC AUTO-ENTMCNC: 32.6 G/DL (ref 31.4–37.4)
MCV RBC AUTO: 89 FL (ref 82–98)
MONOCYTES # BLD AUTO: 0.73 THOUSAND/ΜL (ref 0.17–1.22)
MONOCYTES NFR BLD AUTO: 8 % (ref 4–12)
NEUTROPHILS # BLD AUTO: 4.85 THOUSANDS/ΜL (ref 1.85–7.62)
NEUTS SEG NFR BLD AUTO: 55 % (ref 43–75)
NONHDLC SERPL-MCNC: 116 MG/DL
NRBC BLD AUTO-RTO: 0 /100 WBCS
PLATELET # BLD AUTO: 313 THOUSANDS/UL (ref 149–390)
PMV BLD AUTO: 10.3 FL (ref 8.9–12.7)
POTASSIUM SERPL-SCNC: 4.7 MMOL/L (ref 3.5–5.3)
PROT SERPL-MCNC: 7.8 G/DL (ref 6.4–8.2)
RBC # BLD AUTO: 5.03 MILLION/UL (ref 3.81–5.12)
SODIUM SERPL-SCNC: 139 MMOL/L (ref 136–145)
TRIGL SERPL-MCNC: 162 MG/DL
WBC # BLD AUTO: 8.9 THOUSAND/UL (ref 4.31–10.16)

## 2021-07-05 PROCEDURE — 83540 ASSAY OF IRON: CPT

## 2021-07-05 PROCEDURE — 80053 COMPREHEN METABOLIC PANEL: CPT

## 2021-07-05 PROCEDURE — 80061 LIPID PANEL: CPT

## 2021-07-05 PROCEDURE — 36415 COLL VENOUS BLD VENIPUNCTURE: CPT

## 2021-07-05 PROCEDURE — 83036 HEMOGLOBIN GLYCOSYLATED A1C: CPT

## 2021-07-05 PROCEDURE — 85025 COMPLETE CBC W/AUTO DIFF WBC: CPT

## 2021-07-06 ENCOUNTER — OFFICE VISIT (OUTPATIENT)
Dept: FAMILY MEDICINE CLINIC | Facility: CLINIC | Age: 51
End: 2021-07-06
Payer: COMMERCIAL

## 2021-07-06 VITALS
WEIGHT: 141 LBS | HEART RATE: 90 BPM | OXYGEN SATURATION: 97 % | DIASTOLIC BLOOD PRESSURE: 82 MMHG | BODY MASS INDEX: 24.98 KG/M2 | SYSTOLIC BLOOD PRESSURE: 130 MMHG | HEIGHT: 63 IN

## 2021-07-06 DIAGNOSIS — K51.20 CHRONIC ULCERATIVE PROCTITIS WITHOUT COMPLICATIONS (HCC): ICD-10-CM

## 2021-07-06 DIAGNOSIS — Z72.0 TOBACCO USE: ICD-10-CM

## 2021-07-06 DIAGNOSIS — G47.00 INSOMNIA, UNSPECIFIED TYPE: ICD-10-CM

## 2021-07-06 DIAGNOSIS — E78.5 HYPERLIPIDEMIA, UNSPECIFIED HYPERLIPIDEMIA TYPE: Primary | ICD-10-CM

## 2021-07-06 PROBLEM — E87.6 HYPOKALEMIA: Status: RESOLVED | Noted: 2020-07-25 | Resolved: 2021-07-06

## 2021-07-06 PROBLEM — D64.9 ANEMIA: Status: RESOLVED | Noted: 2020-08-11 | Resolved: 2021-07-06

## 2021-07-06 PROBLEM — E87.0 HYPERNATREMIA: Status: RESOLVED | Noted: 2020-07-29 | Resolved: 2021-07-06

## 2021-07-06 LAB
EST. AVERAGE GLUCOSE BLD GHB EST-MCNC: 111 MG/DL
HBA1C MFR BLD: 5.5 %
IRON SERPL-MCNC: 152 UG/DL (ref 50–170)

## 2021-07-06 PROCEDURE — 4004F PT TOBACCO SCREEN RCVD TLK: CPT | Performed by: FAMILY MEDICINE

## 2021-07-06 PROCEDURE — 3008F BODY MASS INDEX DOCD: CPT | Performed by: FAMILY MEDICINE

## 2021-07-06 PROCEDURE — 3725F SCREEN DEPRESSION PERFORMED: CPT | Performed by: FAMILY MEDICINE

## 2021-07-06 PROCEDURE — 3044F HG A1C LEVEL LT 7.0%: CPT | Performed by: FAMILY MEDICINE

## 2021-07-06 PROCEDURE — 99214 OFFICE O/P EST MOD 30 MIN: CPT | Performed by: FAMILY MEDICINE

## 2021-07-06 RX ORDER — ZOLPIDEM TARTRATE 12.5 MG/1
12.5 TABLET, FILM COATED, EXTENDED RELEASE ORAL
Qty: 30 TABLET | Refills: 5 | Status: SHIPPED | OUTPATIENT
Start: 2021-07-06 | End: 2022-01-24

## 2021-07-06 NOTE — PROGRESS NOTES
Assessment/Plan:     return visit in 7 months with fasting blood work prior to visit  Problem List Items Addressed This Visit        Digestive    Ulcerative (chronic) proctitis (HCC)      Continue probiotic daily  Follow-up with Gastroenterology            Other    Insomnia    Relevant Medications    zolpidem (AMBIEN CR) 12 5 MG CR tablet    Hyperlipidemia - Primary      Continue Crestor 10 mg daily         Relevant Orders    CBC and differential    Comprehensive metabolic panel    Lipid panel    Tobacco use       Smoking cessation recommended                 Subjective:      Patient ID: Tonna Ormond is a 46 y o  female  Patient comes in for a checkup  She complains of insomnia  The following portions of the patient's history were reviewed and updated as appropriate:   Past Medical History:  She has a past medical history of Hypokalemia ,  _______________________________________________________________________  Medical Problems:  does not have any pertinent problems on file ,  _______________________________________________________________________  Past Surgical History:   has a past surgical history that includes Cervical biopsy w/ loop electrode excision  ,  _______________________________________________________________________  Family History:  family history includes Colon cancer in her maternal grandfather ,  _______________________________________________________________________  Social History:   reports that she has been smoking cigarettes  She started smoking about 11 months ago  She has been smoking about 0 50 packs per day  She has never used smokeless tobacco  She reports previous alcohol use of about 3 0 standard drinks of alcohol per week  She reports that she does not use drugs  ,  _______________________________________________________________________  Allergies:  is allergic to amoxil [amoxicillin]     _______________________________________________________________________  Current Outpatient Medications   Medication Sig Dispense Refill    Calcium Carb-Cholecalciferol (Calcium 500/D) 500-400 MG-UNIT CHEW Chew 1 tablet daily      ferrous sulfate 325 (65 Fe) mg tablet Take 325 mg by mouth daily with breakfast      Multiple Vitamin (multivitamin) tablet Take 1 tablet by mouth daily      Potassium 99 MG TABS Take 1 tablet by mouth daily      Probiotic Product (ALIGN PO) Take 1 capsule by mouth daily      rosuvastatin (CRESTOR) 10 MG tablet Take 1 tablet (10 mg total) by mouth daily 30 tablet 5    zolpidem (AMBIEN CR) 12 5 MG CR tablet Take 1 tablet (12 5 mg total) by mouth daily at bedtime as needed for sleep 30 tablet 5     No current facility-administered medications for this visit      _______________________________________________________________________  Review of Systems   Constitutional: Negative  Respiratory: Negative  Cardiovascular: Negative  Gastrointestinal: Negative  Psychiatric/Behavioral: Positive for sleep disturbance  Objective:  Vitals:    07/06/21 1450   BP: 130/82   BP Location: Left arm   Patient Position: Sitting   Cuff Size: Adult   Pulse: 90   SpO2: 97%   Weight: 64 kg (141 lb)   Height: 5' 3" (1 6 m)     Body mass index is 24 98 kg/m²  Physical Exam  Constitutional:       Appearance: She is well-developed  HENT:      Head: Normocephalic and atraumatic  Right Ear: External ear normal       Left Ear: External ear normal    Eyes:      Pupils: Pupils are equal, round, and reactive to light  Neck:      Thyroid: No thyromegaly  Cardiovascular:      Rate and Rhythm: Normal rate and regular rhythm  Heart sounds: Normal heart sounds  Pulmonary:      Effort: Pulmonary effort is normal       Breath sounds: Normal breath sounds  Abdominal:      Palpations: Abdomen is soft  Tenderness: There is no abdominal tenderness  Musculoskeletal:         General: Normal range of motion  Cervical back: Neck supple     Lymphadenopathy: Cervical: No cervical adenopathy  Skin:     General: Skin is warm and dry  Neurological:      Mental Status: She is alert and oriented to person, place, and time

## 2021-07-23 ENCOUNTER — IMMUNIZATIONS (OUTPATIENT)
Dept: FAMILY MEDICINE CLINIC | Facility: HOSPITAL | Age: 51
End: 2021-07-23

## 2021-07-23 DIAGNOSIS — Z23 ENCOUNTER FOR IMMUNIZATION: Primary | ICD-10-CM

## 2021-07-23 PROCEDURE — 91300 SARS-COV-2 / COVID-19 MRNA VACCINE (PFIZER-BIONTECH) 30 MCG: CPT

## 2021-07-23 PROCEDURE — 0001A SARS-COV-2 / COVID-19 MRNA VACCINE (PFIZER-BIONTECH) 30 MCG: CPT

## 2021-08-13 ENCOUNTER — IMMUNIZATIONS (OUTPATIENT)
Dept: FAMILY MEDICINE CLINIC | Facility: HOSPITAL | Age: 51
End: 2021-08-13

## 2021-08-13 DIAGNOSIS — Z23 ENCOUNTER FOR IMMUNIZATION: Primary | ICD-10-CM

## 2021-08-13 PROCEDURE — 91300 SARS-COV-2 / COVID-19 MRNA VACCINE (PFIZER-BIONTECH) 30 MCG: CPT

## 2021-08-13 PROCEDURE — 0002A SARS-COV-2 / COVID-19 MRNA VACCINE (PFIZER-BIONTECH) 30 MCG: CPT

## 2021-09-23 DIAGNOSIS — E78.5 HYPERLIPIDEMIA, UNSPECIFIED HYPERLIPIDEMIA TYPE: ICD-10-CM

## 2021-09-23 RX ORDER — ROSUVASTATIN CALCIUM 10 MG/1
TABLET, COATED ORAL
Qty: 30 TABLET | Refills: 5 | Status: SHIPPED | OUTPATIENT
Start: 2021-09-23 | End: 2022-02-28 | Stop reason: SDUPTHER

## 2021-10-14 ENCOUNTER — TELEPHONE (OUTPATIENT)
Dept: ADMINISTRATIVE | Facility: OTHER | Age: 51
End: 2021-10-14

## 2022-01-06 ENCOUNTER — VBI (OUTPATIENT)
Dept: ADMINISTRATIVE | Facility: OTHER | Age: 52
End: 2022-01-06

## 2022-01-24 DIAGNOSIS — G47.00 INSOMNIA, UNSPECIFIED TYPE: ICD-10-CM

## 2022-01-24 RX ORDER — ZOLPIDEM TARTRATE 12.5 MG/1
TABLET, FILM COATED, EXTENDED RELEASE ORAL
Qty: 30 TABLET | Refills: 1 | Status: SHIPPED | OUTPATIENT
Start: 2022-01-24 | End: 2022-03-27

## 2022-02-28 ENCOUNTER — OFFICE VISIT (OUTPATIENT)
Dept: FAMILY MEDICINE CLINIC | Facility: CLINIC | Age: 52
End: 2022-02-28
Payer: COMMERCIAL

## 2022-02-28 VITALS
HEART RATE: 69 BPM | BODY MASS INDEX: 22.96 KG/M2 | HEIGHT: 63 IN | SYSTOLIC BLOOD PRESSURE: 118 MMHG | OXYGEN SATURATION: 98 % | DIASTOLIC BLOOD PRESSURE: 74 MMHG | WEIGHT: 129.6 LBS

## 2022-02-28 DIAGNOSIS — G47.00 INSOMNIA, UNSPECIFIED TYPE: ICD-10-CM

## 2022-02-28 DIAGNOSIS — Z00.00 HEALTH MAINTENANCE EXAMINATION: ICD-10-CM

## 2022-02-28 DIAGNOSIS — Z12.31 ENCOUNTER FOR SCREENING MAMMOGRAM FOR BREAST CANCER: Primary | ICD-10-CM

## 2022-02-28 DIAGNOSIS — K51.20 CHRONIC ULCERATIVE PROCTITIS WITHOUT COMPLICATIONS (HCC): ICD-10-CM

## 2022-02-28 DIAGNOSIS — Z72.0 TOBACCO USE: ICD-10-CM

## 2022-02-28 DIAGNOSIS — E78.5 HYPERLIPIDEMIA, UNSPECIFIED HYPERLIPIDEMIA TYPE: ICD-10-CM

## 2022-02-28 PROCEDURE — 4004F PT TOBACCO SCREEN RCVD TLK: CPT | Performed by: FAMILY MEDICINE

## 2022-02-28 PROCEDURE — 3725F SCREEN DEPRESSION PERFORMED: CPT | Performed by: FAMILY MEDICINE

## 2022-02-28 PROCEDURE — 3008F BODY MASS INDEX DOCD: CPT | Performed by: FAMILY MEDICINE

## 2022-02-28 PROCEDURE — 99396 PREV VISIT EST AGE 40-64: CPT | Performed by: FAMILY MEDICINE

## 2022-02-28 RX ORDER — ROSUVASTATIN CALCIUM 10 MG/1
10 TABLET, COATED ORAL DAILY
Qty: 30 TABLET | Refills: 5 | Status: SHIPPED | OUTPATIENT
Start: 2022-02-28

## 2022-02-28 NOTE — PROGRESS NOTES
Depression Screening and Follow-up Plan: Patient was screened for depression during today's encounter  They screened negative with a PHQ-2 score of 0  Assessment/Plan:         Problem List Items Addressed This Visit        Digestive    Ulcerative (chronic) proctitis ([address] )       Other    Health maintenance examination    Insomnia     Continue Ambien         Hyperlipidemia    Relevant Medications    rosuvastatin (CRESTOR) 10 MG tablet    Tobacco use     Smoking cessation recommended           Other Visit Diagnoses     Encounter for screening mammogram for breast cancer    -  Primary    Relevant Orders    Mammo screening bilateral w 3d & cad            Subjective:      Patient ID: Hoang Williamson is a 46 y o  female  Patient comes in for check up      The following portions of the patient's history were reviewed and updated as appropriate:   Past Medical History:  She has a past medical history of Hypokalemia ,  _______________________________________________________________________  Medical Problems:  does not have any pertinent problems on file ,  _______________________________________________________________________  Past Surgical History:   has a past surgical history that includes Cervical biopsy w/ loop electrode excision  ,  _______________________________________________________________________  Family History:  family history includes Colon cancer in her maternal grandfather ,  _______________________________________________________________________  Social History:   reports that she has been smoking cigarettes  She started smoking about 20 months ago  She has been smoking about 0 50 packs per day  She has never used smokeless tobacco  She reports previous alcohol use of about 3 0 standard drinks of alcohol per week  She reports that she does not use drugs  ,  _______________________________________________________________________  Allergies:  is allergic to amoxil [amoxicillin]     _______________________________________________________________________  Current Outpatient Medications   Medication Sig Dispense Refill    Calcium Carb-Cholecalciferol (Calcium 500/D) 500-400 MG-UNIT CHEW Chew 1 tablet daily      ferrous sulfate 325 (65 Fe) mg tablet Take 325 mg by mouth daily with breakfast      Multiple Vitamin (multivitamin) tablet Take 1 tablet by mouth daily      Potassium 99 MG TABS Take 1 tablet by mouth daily      Probiotic Product (ALIGN PO) Take 1 capsule by mouth daily      rosuvastatin (CRESTOR) 10 MG tablet Take 1 tablet (10 mg total) by mouth daily 30 tablet 5    zolpidem (AMBIEN CR) 12 5 MG CR tablet take 1 tablet by mouth at bedtime if needed for sleep 30 tablet 3     No current facility-administered medications for this visit      _______________________________________________________________________  Review of Systems   Constitutional: Negative  Respiratory: Negative  Cardiovascular: Negative  Objective:  Vitals:    02/28/22 1058   BP: 118/74   BP Location: Left arm   Patient Position: Sitting   Cuff Size: Adult   Pulse: 69   SpO2: 98%   Weight: 58 8 kg (129 lb 9 6 oz)   Height: 5' 3" (1 6 m)     Body mass index is 22 96 kg/m²  Physical Exam  Constitutional:       Appearance: She is well-developed  HENT:      Head: Normocephalic and atraumatic  Eyes:      Pupils: Pupils are equal, round, and reactive to light  Neck:      Thyroid: No thyromegaly  Cardiovascular:      Rate and Rhythm: Normal rate and regular rhythm  Heart sounds: Normal heart sounds  Pulmonary:      Effort: Pulmonary effort is normal       Breath sounds: Normal breath sounds  Abdominal:      Palpations: Abdomen is soft  Musculoskeletal:         General: Normal range of motion  Cervical back: Neck supple  Lymphadenopathy:      Cervical: No cervical adenopathy  Skin:     General: Skin is warm and dry     Neurological:      Mental Status: She is alert    Psychiatric:         Mood and Affect: Mood normal          Behavior: Behavior normal

## 2022-02-28 NOTE — ASSESSMENT & PLAN NOTE
Continue Ambien Patient's VSS for shift. No mental changes noted. Patient remains with eyes open, non-verbal, with spontaneous movements of left side of body. Patient turned every 2 hours-no further skin breakdown or injury noted. Patient's sister very actively involved in care. Waiting on bed for discharge home.

## 2022-03-25 DIAGNOSIS — G47.00 INSOMNIA, UNSPECIFIED TYPE: ICD-10-CM

## 2022-03-27 RX ORDER — ZOLPIDEM TARTRATE 12.5 MG/1
TABLET, FILM COATED, EXTENDED RELEASE ORAL
Qty: 30 TABLET | Refills: 3 | Status: SHIPPED | OUTPATIENT
Start: 2022-03-27 | End: 2022-07-22 | Stop reason: SDUPTHER

## 2022-04-01 ENCOUNTER — APPOINTMENT (OUTPATIENT)
Dept: LAB | Facility: HOSPITAL | Age: 52
End: 2022-04-01
Payer: COMMERCIAL

## 2022-04-01 ENCOUNTER — TELEPHONE (OUTPATIENT)
Dept: FAMILY MEDICINE CLINIC | Facility: CLINIC | Age: 52
End: 2022-04-01

## 2022-04-01 DIAGNOSIS — E78.5 HYPERLIPIDEMIA, UNSPECIFIED HYPERLIPIDEMIA TYPE: ICD-10-CM

## 2022-04-01 LAB
ALBUMIN SERPL BCP-MCNC: 3 G/DL (ref 3.5–5)
ALP SERPL-CCNC: 104 U/L (ref 46–116)
ALT SERPL W P-5'-P-CCNC: 28 U/L (ref 12–78)
ANION GAP SERPL CALCULATED.3IONS-SCNC: 9 MMOL/L (ref 4–13)
AST SERPL W P-5'-P-CCNC: 20 U/L (ref 5–45)
BASOPHILS # BLD AUTO: 0.08 THOUSANDS/ΜL (ref 0–0.1)
BASOPHILS NFR BLD AUTO: 1 % (ref 0–1)
BILIRUB SERPL-MCNC: 0.46 MG/DL (ref 0.2–1)
BUN SERPL-MCNC: 10 MG/DL (ref 5–25)
CALCIUM ALBUM COR SERPL-MCNC: 9.1 MG/DL (ref 8.3–10.1)
CALCIUM SERPL-MCNC: 8.3 MG/DL (ref 8.3–10.1)
CHLORIDE SERPL-SCNC: 99 MMOL/L (ref 100–108)
CHOLEST SERPL-MCNC: 144 MG/DL
CO2 SERPL-SCNC: 34 MMOL/L (ref 21–32)
CREAT SERPL-MCNC: 0.84 MG/DL (ref 0.6–1.3)
EOSINOPHIL # BLD AUTO: 0.11 THOUSAND/ΜL (ref 0–0.61)
EOSINOPHIL NFR BLD AUTO: 1 % (ref 0–6)
ERYTHROCYTE [DISTWIDTH] IN BLOOD BY AUTOMATED COUNT: 15.6 % (ref 11.6–15.1)
GFR SERPL CREATININE-BSD FRML MDRD: 80 ML/MIN/1.73SQ M
GLUCOSE P FAST SERPL-MCNC: 75 MG/DL (ref 65–99)
HCT VFR BLD AUTO: 38.8 % (ref 34.8–46.1)
HDLC SERPL-MCNC: 55 MG/DL
HGB BLD-MCNC: 13.1 G/DL (ref 11.5–15.4)
IMM GRANULOCYTES # BLD AUTO: 0.05 THOUSAND/UL (ref 0–0.2)
IMM GRANULOCYTES NFR BLD AUTO: 0 % (ref 0–2)
LDLC SERPL CALC-MCNC: 54 MG/DL (ref 0–100)
LYMPHOCYTES # BLD AUTO: 3.2 THOUSANDS/ΜL (ref 0.6–4.47)
LYMPHOCYTES NFR BLD AUTO: 26 % (ref 14–44)
MCH RBC QN AUTO: 29.3 PG (ref 26.8–34.3)
MCHC RBC AUTO-ENTMCNC: 33.8 G/DL (ref 31.4–37.4)
MCV RBC AUTO: 87 FL (ref 82–98)
MONOCYTES # BLD AUTO: 1.25 THOUSAND/ΜL (ref 0.17–1.22)
MONOCYTES NFR BLD AUTO: 10 % (ref 4–12)
NEUTROPHILS # BLD AUTO: 7.53 THOUSANDS/ΜL (ref 1.85–7.62)
NEUTS SEG NFR BLD AUTO: 62 % (ref 43–75)
NONHDLC SERPL-MCNC: 89 MG/DL
NRBC BLD AUTO-RTO: 0 /100 WBCS
PLATELET # BLD AUTO: 411 THOUSANDS/UL (ref 149–390)
PMV BLD AUTO: 9.6 FL (ref 8.9–12.7)
POTASSIUM SERPL-SCNC: 2.6 MMOL/L (ref 3.5–5.3)
PROT SERPL-MCNC: 6.5 G/DL (ref 6.4–8.2)
RBC # BLD AUTO: 4.47 MILLION/UL (ref 3.81–5.12)
SODIUM SERPL-SCNC: 142 MMOL/L (ref 136–145)
TRIGL SERPL-MCNC: 175 MG/DL
WBC # BLD AUTO: 12.22 THOUSAND/UL (ref 4.31–10.16)

## 2022-04-01 PROCEDURE — 36415 COLL VENOUS BLD VENIPUNCTURE: CPT

## 2022-04-01 PROCEDURE — 80053 COMPREHEN METABOLIC PANEL: CPT

## 2022-04-01 PROCEDURE — 85025 COMPLETE CBC W/AUTO DIFF WBC: CPT

## 2022-04-01 PROCEDURE — 80061 LIPID PANEL: CPT

## 2022-04-01 NOTE — TELEPHONE ENCOUNTER
Pieter Hall  called from [facility] with critical lab results :     Potassium 2 6  Please advice

## 2022-04-03 DIAGNOSIS — E87.6 HYPOKALEMIA: Primary | ICD-10-CM

## 2022-04-04 ENCOUNTER — TELEPHONE (OUTPATIENT)
Dept: FAMILY MEDICINE CLINIC | Facility: CLINIC | Age: 52
End: 2022-04-04

## 2022-04-11 ENCOUNTER — APPOINTMENT (OUTPATIENT)
Dept: LAB | Facility: HOSPITAL | Age: 52
End: 2022-04-11
Payer: COMMERCIAL

## 2022-04-11 DIAGNOSIS — E87.6 HYPOKALEMIA: ICD-10-CM

## 2022-04-11 LAB
ANION GAP SERPL CALCULATED.3IONS-SCNC: 10 MMOL/L (ref 4–13)
BUN SERPL-MCNC: 11 MG/DL (ref 5–25)
CALCIUM SERPL-MCNC: 8.4 MG/DL (ref 8.3–10.1)
CHLORIDE SERPL-SCNC: 105 MMOL/L (ref 100–108)
CO2 SERPL-SCNC: 23 MMOL/L (ref 21–32)
CREAT SERPL-MCNC: 0.75 MG/DL (ref 0.6–1.3)
GFR SERPL CREATININE-BSD FRML MDRD: 91 ML/MIN/1.73SQ M
GLUCOSE P FAST SERPL-MCNC: 70 MG/DL (ref 65–99)
POTASSIUM SERPL-SCNC: 4.1 MMOL/L (ref 3.5–5.3)
SODIUM SERPL-SCNC: 138 MMOL/L (ref 136–145)

## 2022-04-11 PROCEDURE — 36415 COLL VENOUS BLD VENIPUNCTURE: CPT

## 2022-04-11 PROCEDURE — 80048 BASIC METABOLIC PNL TOTAL CA: CPT

## 2022-07-28 ENCOUNTER — VBI (OUTPATIENT)
Dept: ADMINISTRATIVE | Facility: OTHER | Age: 52
End: 2022-07-28

## 2022-10-18 DIAGNOSIS — E78.5 HYPERLIPIDEMIA, UNSPECIFIED HYPERLIPIDEMIA TYPE: ICD-10-CM

## 2022-10-18 RX ORDER — ROSUVASTATIN CALCIUM 10 MG/1
TABLET, COATED ORAL
Qty: 30 TABLET | Refills: 5 | Status: SHIPPED | OUTPATIENT
Start: 2022-10-18

## 2022-11-10 ENCOUNTER — VBI (OUTPATIENT)
Dept: ADMINISTRATIVE | Facility: OTHER | Age: 52
End: 2022-11-10

## 2023-02-17 DIAGNOSIS — G47.00 INSOMNIA, UNSPECIFIED TYPE: Primary | ICD-10-CM

## 2023-02-19 RX ORDER — ZOLPIDEM TARTRATE 12.5 MG/1
TABLET, FILM COATED, EXTENDED RELEASE ORAL
Qty: 30 TABLET | Refills: 1 | Status: SHIPPED | OUTPATIENT
Start: 2023-02-19

## 2023-03-06 ENCOUNTER — OFFICE VISIT (OUTPATIENT)
Dept: FAMILY MEDICINE CLINIC | Facility: CLINIC | Age: 53
End: 2023-03-06

## 2023-03-06 VITALS
BODY MASS INDEX: 22.15 KG/M2 | WEIGHT: 125 LBS | HEIGHT: 63 IN | HEART RATE: 78 BPM | OXYGEN SATURATION: 99 % | SYSTOLIC BLOOD PRESSURE: 116 MMHG | TEMPERATURE: 97.4 F | DIASTOLIC BLOOD PRESSURE: 80 MMHG

## 2023-03-06 DIAGNOSIS — E78.5 HYPERLIPIDEMIA, UNSPECIFIED HYPERLIPIDEMIA TYPE: ICD-10-CM

## 2023-03-06 DIAGNOSIS — Z72.0 TOBACCO USE: ICD-10-CM

## 2023-03-06 DIAGNOSIS — G47.00 INSOMNIA, UNSPECIFIED TYPE: ICD-10-CM

## 2023-03-06 DIAGNOSIS — Z11.4 ENCOUNTER FOR SCREENING FOR HIV: ICD-10-CM

## 2023-03-06 DIAGNOSIS — Z12.31 ENCOUNTER FOR SCREENING MAMMOGRAM FOR MALIGNANT NEOPLASM OF BREAST: ICD-10-CM

## 2023-03-06 DIAGNOSIS — Z11.59 NEED FOR HEPATITIS C SCREENING TEST: ICD-10-CM

## 2023-03-06 DIAGNOSIS — K51.20 CHRONIC ULCERATIVE PROCTITIS WITHOUT COMPLICATIONS (HCC): ICD-10-CM

## 2023-03-06 DIAGNOSIS — Z00.00 HEALTH MAINTENANCE EXAMINATION: Primary | ICD-10-CM

## 2023-03-06 NOTE — PROGRESS NOTES
Name: Kathleen Ponce      : 1970      MRN: [id number]  Encounter Provider: Jason Nichole MD  Encounter Date: 3/6/2023   Encounter department: [facility] [address] [address]   Return visit in 1 year for annual physical  1  Health maintenance examination    2  Hyperlipidemia, unspecified hyperlipidemia type  Assessment & Plan:  Continue Crestor 10 mg daily    Orders:  -     CBC and differential; Future  -     Comprehensive metabolic panel; Future  -     Lipid panel; Future    3  Chronic ulcerative proctitis without complications [facility])  Assessment & Plan:  Due for colonoscopy in   Tobacco use    5  Insomnia, unspecified type  Assessment & Plan:  Continue Ambien 12 5 mg as needed      6  Encounter for screening for HIV  -     : HIV 1/2 AB/AG w Reflex [facility] for 2 yr old and above; Future    7  Need for hepatitis C screening test  -     Hepatitis C antibody; Future    8  Encounter for screening mammogram for malignant neoplasm of breast  -     Mammo screening bilateral w 3d & cad; Future; Expected date: 2023      Depression Screening and Follow-up Plan: Patient was screened for depression during today's encounter  They screened negative with a PHQ-2 score of 2  Tobacco Cessation Counseling: Tobacco cessation counseling was provided  The patient is sincerely urged to quit consumption of tobacco  She is not ready to quit tobacco          Subjective      Patient comes in for annual physical   She had a viral infection in December but otherwise has done well in the last year  Review of Systems   Constitutional: Negative  HENT: Negative  Respiratory: Negative  Cardiovascular: Negative  Gastrointestinal: Negative          Current Outpatient Medications on File Prior to Visit   Medication Sig   • Calcium Carb-Cholecalciferol (Calcium 500/D) 500-400 MG-UNIT CHEW Chew 1 tablet daily   • Multiple Vitamin (multivitamin) tablet Take 1 tablet by mouth daily   • Potassium 99 MG TABS Take 1 tablet by mouth daily   • Probiotic Product (ALIGN PO) Take 1 capsule by mouth daily   • rosuvastatin (CRESTOR) 10 MG tablet take 1 tablet by mouth once daily   • zolpidem (AMBIEN CR) 12 5 MG CR tablet take 1 tablet by mouth at bedtime if needed for sleep   • [DISCONTINUED] ferrous sulfate 325 (65 Fe) mg tablet Take 325 mg by mouth daily with breakfast (Patient not taking: Reported on 3/6/2023)       Objective     /80 (BP Location: Left arm, Patient Position: Sitting, Cuff Size: Adult)   Pulse 78   Temp (!) 97 4 °F (36 3 °C)   Ht 5' 3" (1 6 m)   Wt 56 7 kg (125 lb)   SpO2 99%   BMI 22 14 kg/m²     Physical Exam  Constitutional:       Appearance: She is well-developed  HENT:      Head: Normocephalic and atraumatic  Right Ear: Tympanic membrane, ear canal and external ear normal       Left Ear: Tympanic membrane, ear canal and external ear normal       Nose: Nose normal       Mouth/Throat:      Mouth: Mucous membranes are moist       Pharynx: Oropharynx is clear  Eyes:      Pupils: Pupils are equal, round, and reactive to light  Neck:      Thyroid: No thyromegaly  Cardiovascular:      Rate and Rhythm: Normal rate and regular rhythm  Heart sounds: Normal heart sounds  Pulmonary:      Effort: Pulmonary effort is normal       Breath sounds: Normal breath sounds  Abdominal:      Palpations: Abdomen is soft  There is no mass  Tenderness: There is no abdominal tenderness  Musculoskeletal:         General: Normal range of motion  Cervical back: Neck supple  Lymphadenopathy:      Cervical: No cervical adenopathy  Skin:     General: Skin is warm and dry  Neurological:      Mental Status: She is alert     Psychiatric:         Mood and Affect: Mood normal          Behavior: Behavior normal        Ok Du MD

## 2023-04-07 ENCOUNTER — APPOINTMENT (OUTPATIENT)
Dept: LAB | Facility: HOSPITAL | Age: 53
End: 2023-04-07

## 2023-04-07 ENCOUNTER — TELEPHONE (OUTPATIENT)
Dept: FAMILY MEDICINE CLINIC | Facility: CLINIC | Age: 53
End: 2023-04-07

## 2023-04-07 DIAGNOSIS — G47.00 INSOMNIA, UNSPECIFIED TYPE: ICD-10-CM

## 2023-04-07 DIAGNOSIS — Z11.59 NEED FOR HEPATITIS C SCREENING TEST: ICD-10-CM

## 2023-04-07 DIAGNOSIS — Z11.4 ENCOUNTER FOR SCREENING FOR HIV: ICD-10-CM

## 2023-04-07 DIAGNOSIS — E78.5 HYPERLIPIDEMIA, UNSPECIFIED HYPERLIPIDEMIA TYPE: ICD-10-CM

## 2023-04-07 LAB
ALBUMIN SERPL BCP-MCNC: 4 G/DL (ref 3.5–5)
ALP SERPL-CCNC: 171 U/L (ref 34–104)
ALT SERPL W P-5'-P-CCNC: 46 U/L (ref 7–52)
ANION GAP SERPL CALCULATED.3IONS-SCNC: 12 MMOL/L (ref 4–13)
ANISOCYTOSIS BLD QL SMEAR: PRESENT
AST SERPL W P-5'-P-CCNC: 26 U/L (ref 13–39)
BASOPHILS # BLD MANUAL: 0 THOUSAND/UL (ref 0–0.1)
BASOPHILS NFR MAR MANUAL: 0 % (ref 0–1)
BILIRUB SERPL-MCNC: 1.06 MG/DL (ref 0.2–1)
BUN SERPL-MCNC: 12 MG/DL (ref 5–25)
CALCIUM SERPL-MCNC: 8.9 MG/DL (ref 8.4–10.2)
CHLORIDE SERPL-SCNC: 100 MMOL/L (ref 96–108)
CHOLEST SERPL-MCNC: 171 MG/DL
CO2 SERPL-SCNC: 27 MMOL/L (ref 21–32)
CREAT SERPL-MCNC: 1.98 MG/DL (ref 0.6–1.3)
EOSINOPHIL # BLD MANUAL: 0.27 THOUSAND/UL (ref 0–0.4)
EOSINOPHIL NFR BLD MANUAL: 2 % (ref 0–6)
ERYTHROCYTE [DISTWIDTH] IN BLOOD BY AUTOMATED COUNT: 15.9 % (ref 11.6–15.1)
GFR SERPL CREATININE-BSD FRML MDRD: 28 ML/MIN/1.73SQ M
GLUCOSE P FAST SERPL-MCNC: 92 MG/DL (ref 65–99)
HCT VFR BLD AUTO: 42.9 % (ref 34.8–46.1)
HDLC SERPL-MCNC: 95 MG/DL
HGB BLD-MCNC: 14.6 G/DL (ref 11.5–15.4)
LDLC SERPL CALC-MCNC: 55 MG/DL (ref 0–100)
LYMPHOCYTES # BLD AUTO: 34 % (ref 14–44)
LYMPHOCYTES # BLD AUTO: 4.63 THOUSAND/UL (ref 0.6–4.47)
MCH RBC QN AUTO: 32 PG (ref 26.8–34.3)
MCHC RBC AUTO-ENTMCNC: 34 G/DL (ref 31.4–37.4)
MCV RBC AUTO: 94 FL (ref 82–98)
MONOCYTES # BLD AUTO: 1.09 THOUSAND/UL (ref 0–1.22)
MONOCYTES NFR BLD: 8 % (ref 4–12)
NEUTROPHILS # BLD MANUAL: 7.63 THOUSAND/UL (ref 1.85–7.62)
NEUTS BAND NFR BLD MANUAL: 2 % (ref 0–8)
NEUTS SEG NFR BLD AUTO: 54 % (ref 43–75)
NONHDLC SERPL-MCNC: 76 MG/DL
PLATELET # BLD AUTO: 194 THOUSANDS/UL (ref 149–390)
PLATELET BLD QL SMEAR: ADEQUATE
PMV BLD AUTO: 11.1 FL (ref 8.9–12.7)
POIKILOCYTOSIS BLD QL SMEAR: PRESENT
POTASSIUM SERPL-SCNC: 2.2 MMOL/L (ref 3.5–5.3)
PROT SERPL-MCNC: 7.4 G/DL (ref 6.4–8.4)
RBC # BLD AUTO: 4.56 MILLION/UL (ref 3.81–5.12)
SODIUM SERPL-SCNC: 139 MMOL/L (ref 135–147)
TRIGL SERPL-MCNC: 104 MG/DL
WBC # BLD AUTO: 13.63 THOUSAND/UL (ref 4.31–10.16)

## 2023-04-07 RX ORDER — ZOLPIDEM TARTRATE 12.5 MG/1
12.5 TABLET, FILM COATED, EXTENDED RELEASE ORAL
Qty: 30 TABLET | Refills: 1 | Status: CANCELLED | OUTPATIENT
Start: 2023-04-07

## 2023-04-07 NOTE — TELEPHONE ENCOUNTER
Took call from [address], he called with a critical lab results  He states her potassium is 2 2  please advise

## 2023-04-07 NOTE — TELEPHONE ENCOUNTER
Spoke with pt- pt is taking potassium - once a day, 50 mg - pt aware this is not the right strength/dosage (she bought a wrong one), will buy another bottle once she is done  Also pt requesting to refill medicines a few days before She runs out  1 [id number] 03/20/2023 02/19/2023 Zolpidem Tartrate (Tablet, Extended Release) 30 0 30 12 5 MG NA VANESSA ARSHAD [employer] 1 / 1 [place]    1 [id number] 02/19/2023 02/19/2023 Zolpidem Tartrate (Tablet, Extended Release) 30 0 30 12 5 MG NA VANESSA ARSHAD [employer] 0 / 1 [place]    1 [id number] 01/19/2023 07/26/2022 Zolpidem Tartrate (Tablet, Extended Release) 30 0 30 12 5 MG NA VANESSA ARSHAD [employer] 0 / 5 [place]    1 [id number] 12/22/2022 07/25/2022 Zolpidem Tartrate (Tablet, Extended Release) 30 0 30 12 5 MG NA VANESSA ARSHAD [employer] 5 / 5 [place]    1 [id number] 11/23/2022 07/25/2022 Zolpidem Tartrate (Tablet, Extended Release) 30 0 30 12 5 MG NA VANESSA ARSHAD [employer] 4 / 5 [place]    1 [id number] 10/23/2022 07/25/2022 Zolpidem Tartrate (Tablet, Extended Release) 30 0 30 12 5 MG SULAIMAN WATT            PDMP REVIEWED VIA WEBSITE : YES   PAIN MANAGEMENT ON FILE : NONE      Med refill attached  Please approve or refuse refill

## 2023-04-08 DIAGNOSIS — N17.9 AKI (ACUTE KIDNEY INJURY) (HCC): Primary | ICD-10-CM

## 2023-04-08 LAB
HCV AB SER QL: NORMAL
HIV 1+2 AB+HIV1 P24 AG SERPL QL IA: NORMAL
HIV 2 AB SERPL QL IA: NORMAL
HIV1 AB SERPL QL IA: NORMAL
HIV1 P24 AG SERPL QL IA: NORMAL

## 2023-04-13 PROBLEM — D72.829 LEUKOCYTOSIS: Status: ACTIVE | Noted: 2023-04-13

## 2023-04-13 PROBLEM — F10.10 ALCOHOL ABUSE: Status: ACTIVE | Noted: 2023-04-13

## 2023-04-26 ENCOUNTER — APPOINTMENT (OUTPATIENT)
Dept: LAB | Facility: HOSPITAL | Age: 53
End: 2023-04-26

## 2023-04-26 DIAGNOSIS — D72.829 LEUKOCYTOSIS, UNSPECIFIED TYPE: ICD-10-CM

## 2023-04-26 DIAGNOSIS — N17.9 AKI (ACUTE KIDNEY INJURY) (HCC): ICD-10-CM

## 2023-04-26 LAB
ANION GAP SERPL CALCULATED.3IONS-SCNC: 7 MMOL/L (ref 4–13)
BASOPHILS # BLD AUTO: 0.09 THOUSANDS/ΜL (ref 0–0.1)
BASOPHILS NFR BLD AUTO: 1 % (ref 0–1)
BUN SERPL-MCNC: 9 MG/DL (ref 5–25)
CALCIUM SERPL-MCNC: 9.4 MG/DL (ref 8.4–10.2)
CHLORIDE SERPL-SCNC: 108 MMOL/L (ref 96–108)
CO2 SERPL-SCNC: 25 MMOL/L (ref 21–32)
CREAT SERPL-MCNC: 0.85 MG/DL (ref 0.6–1.3)
EOSINOPHIL # BLD AUTO: 0.28 THOUSAND/ΜL (ref 0–0.61)
EOSINOPHIL NFR BLD AUTO: 3 % (ref 0–6)
ERYTHROCYTE [DISTWIDTH] IN BLOOD BY AUTOMATED COUNT: 15.1 % (ref 11.6–15.1)
GFR SERPL CREATININE-BSD FRML MDRD: 78 ML/MIN/1.73SQ M
GLUCOSE P FAST SERPL-MCNC: 74 MG/DL (ref 65–99)
HCT VFR BLD AUTO: 38.9 % (ref 34.8–46.1)
HGB BLD-MCNC: 12.6 G/DL (ref 11.5–15.4)
IMM GRANULOCYTES # BLD AUTO: 0.02 THOUSAND/UL (ref 0–0.2)
IMM GRANULOCYTES NFR BLD AUTO: 0 % (ref 0–2)
LYMPHOCYTES # BLD AUTO: 3.24 THOUSANDS/ΜL (ref 0.6–4.47)
LYMPHOCYTES NFR BLD AUTO: 33 % (ref 14–44)
MCH RBC QN AUTO: 31.8 PG (ref 26.8–34.3)
MCHC RBC AUTO-ENTMCNC: 32.4 G/DL (ref 31.4–37.4)
MCV RBC AUTO: 98 FL (ref 82–98)
MONOCYTES # BLD AUTO: 0.92 THOUSAND/ΜL (ref 0.17–1.22)
MONOCYTES NFR BLD AUTO: 9 % (ref 4–12)
NEUTROPHILS # BLD AUTO: 5.29 THOUSANDS/ΜL (ref 1.85–7.62)
NEUTS SEG NFR BLD AUTO: 54 % (ref 43–75)
NRBC BLD AUTO-RTO: 0 /100 WBCS
PLATELET # BLD AUTO: 318 THOUSANDS/UL (ref 149–390)
PMV BLD AUTO: 11.2 FL (ref 8.9–12.7)
POTASSIUM SERPL-SCNC: 4.2 MMOL/L (ref 3.5–5.3)
RBC # BLD AUTO: 3.96 MILLION/UL (ref 3.81–5.12)
SODIUM SERPL-SCNC: 140 MMOL/L (ref 135–147)
WBC # BLD AUTO: 9.84 THOUSAND/UL (ref 4.31–10.16)

## 2023-06-09 DIAGNOSIS — E78.5 HYPERLIPIDEMIA, UNSPECIFIED HYPERLIPIDEMIA TYPE: ICD-10-CM

## 2023-06-09 RX ORDER — ROSUVASTATIN CALCIUM 10 MG/1
TABLET, COATED ORAL
Qty: 30 TABLET | Refills: 5 | Status: SHIPPED | OUTPATIENT
Start: 2023-06-09

## 2023-08-10 DIAGNOSIS — E87.6 HYPOKALEMIA: ICD-10-CM

## 2023-08-10 RX ORDER — POTASSIUM CHLORIDE 750 MG/1
10 CAPSULE, EXTENDED RELEASE ORAL 3 TIMES DAILY
Qty: 90 CAPSULE | Refills: 3 | Status: SHIPPED | OUTPATIENT
Start: 2023-08-10

## 2023-10-10 DIAGNOSIS — G47.00 INSOMNIA, UNSPECIFIED TYPE: ICD-10-CM

## 2023-10-10 RX ORDER — ZOLPIDEM TARTRATE 12.5 MG/1
TABLET, FILM COATED, EXTENDED RELEASE ORAL
Qty: 30 TABLET | Refills: 5 | Status: SHIPPED | OUTPATIENT
Start: 2023-10-10

## 2023-10-18 ENCOUNTER — VBI (OUTPATIENT)
Dept: ADMINISTRATIVE | Facility: OTHER | Age: 53
End: 2023-10-18

## 2023-12-03 DIAGNOSIS — E87.6 HYPOKALEMIA: ICD-10-CM

## 2023-12-03 DIAGNOSIS — E78.5 HYPERLIPIDEMIA, UNSPECIFIED HYPERLIPIDEMIA TYPE: ICD-10-CM

## 2023-12-03 RX ORDER — ROSUVASTATIN CALCIUM 10 MG/1
TABLET, COATED ORAL
Qty: 30 TABLET | Refills: 5 | Status: SHIPPED | OUTPATIENT
Start: 2023-12-03

## 2023-12-03 RX ORDER — POTASSIUM CHLORIDE 750 MG/1
10 CAPSULE, EXTENDED RELEASE ORAL 3 TIMES DAILY
Qty: 90 CAPSULE | Refills: 3 | Status: SHIPPED | OUTPATIENT
Start: 2023-12-03

## 2024-02-21 PROBLEM — Z00.00 HEALTH MAINTENANCE EXAMINATION: Status: RESOLVED | Noted: 2018-11-08 | Resolved: 2024-02-21

## 2024-02-29 ENCOUNTER — TELEPHONE (OUTPATIENT)
Dept: FAMILY MEDICINE CLINIC | Facility: CLINIC | Age: 54
End: 2024-02-29

## 2024-02-29 DIAGNOSIS — E78.5 HYPERLIPIDEMIA, UNSPECIFIED HYPERLIPIDEMIA TYPE: Primary | ICD-10-CM

## 2024-02-29 NOTE — TELEPHONE ENCOUNTER
Spoke w pt -  I rescheduled her  PE appointment to 04/23/2024  but she was not sure if she needs annual blood work to have done prior to her eula.       Please advise, thank you!

## 2024-03-26 NOTE — TELEPHONE ENCOUNTER
She was reaching out to the office to let us know that the patient was discharged from the hospital Aug 3rd and needs a follow up apt  No

## 2024-03-27 DIAGNOSIS — E87.6 HYPOKALEMIA: ICD-10-CM

## 2024-03-27 RX ORDER — POTASSIUM CHLORIDE 750 MG/1
10 CAPSULE, EXTENDED RELEASE ORAL 3 TIMES DAILY
Qty: 90 CAPSULE | Refills: 3 | Status: SHIPPED | OUTPATIENT
Start: 2024-03-27

## 2024-04-18 ENCOUNTER — APPOINTMENT (OUTPATIENT)
Dept: LAB | Facility: HOSPITAL | Age: 54
End: 2024-04-18
Attending: FAMILY MEDICINE
Payer: COMMERCIAL

## 2024-04-18 DIAGNOSIS — E78.5 HYPERLIPIDEMIA, UNSPECIFIED HYPERLIPIDEMIA TYPE: ICD-10-CM

## 2024-04-18 LAB
ALBUMIN SERPL BCP-MCNC: 4.4 G/DL (ref 3.5–5)
ALP SERPL-CCNC: 95 U/L (ref 34–104)
ALT SERPL W P-5'-P-CCNC: 11 U/L (ref 7–52)
ANION GAP SERPL CALCULATED.3IONS-SCNC: 10 MMOL/L (ref 4–13)
AST SERPL W P-5'-P-CCNC: 12 U/L (ref 13–39)
BASOPHILS # BLD AUTO: 0.08 THOUSANDS/ÂΜL (ref 0–0.1)
BASOPHILS NFR BLD AUTO: 1 % (ref 0–1)
BILIRUB SERPL-MCNC: 0.65 MG/DL (ref 0.2–1)
BUN SERPL-MCNC: 13 MG/DL (ref 5–25)
CALCIUM SERPL-MCNC: 9.2 MG/DL (ref 8.4–10.2)
CHLORIDE SERPL-SCNC: 105 MMOL/L (ref 96–108)
CHOLEST SERPL-MCNC: 169 MG/DL
CO2 SERPL-SCNC: 25 MMOL/L (ref 21–32)
CREAT SERPL-MCNC: 0.74 MG/DL (ref 0.6–1.3)
EOSINOPHIL # BLD AUTO: 0.14 THOUSAND/ÂΜL (ref 0–0.61)
EOSINOPHIL NFR BLD AUTO: 1 % (ref 0–6)
ERYTHROCYTE [DISTWIDTH] IN BLOOD BY AUTOMATED COUNT: 14 % (ref 11.6–15.1)
GFR SERPL CREATININE-BSD FRML MDRD: 92 ML/MIN/1.73SQ M
GLUCOSE P FAST SERPL-MCNC: 88 MG/DL (ref 65–99)
HCT VFR BLD AUTO: 47.5 % (ref 34.8–46.1)
HDLC SERPL-MCNC: 49 MG/DL
HGB BLD-MCNC: 15.4 G/DL (ref 11.5–15.4)
IMM GRANULOCYTES # BLD AUTO: 0.04 THOUSAND/UL (ref 0–0.2)
IMM GRANULOCYTES NFR BLD AUTO: 0 % (ref 0–2)
LDLC SERPL CALC-MCNC: 95 MG/DL (ref 0–100)
LYMPHOCYTES # BLD AUTO: 3.82 THOUSANDS/ÂΜL (ref 0.6–4.47)
LYMPHOCYTES NFR BLD AUTO: 30 % (ref 14–44)
MCH RBC QN AUTO: 28 PG (ref 26.8–34.3)
MCHC RBC AUTO-ENTMCNC: 32.4 G/DL (ref 31.4–37.4)
MCV RBC AUTO: 86 FL (ref 82–98)
MONOCYTES # BLD AUTO: 1.14 THOUSAND/ÂΜL (ref 0.17–1.22)
MONOCYTES NFR BLD AUTO: 9 % (ref 4–12)
NEUTROPHILS # BLD AUTO: 7.48 THOUSANDS/ÂΜL (ref 1.85–7.62)
NEUTS SEG NFR BLD AUTO: 59 % (ref 43–75)
NONHDLC SERPL-MCNC: 120 MG/DL
NRBC BLD AUTO-RTO: 0 /100 WBCS
PLATELET # BLD AUTO: 269 THOUSANDS/UL (ref 149–390)
PMV BLD AUTO: 10.8 FL (ref 8.9–12.7)
POTASSIUM SERPL-SCNC: 4.2 MMOL/L (ref 3.5–5.3)
PROT SERPL-MCNC: 7.5 G/DL (ref 6.4–8.4)
RBC # BLD AUTO: 5.5 MILLION/UL (ref 3.81–5.12)
SODIUM SERPL-SCNC: 140 MMOL/L (ref 135–147)
TRIGL SERPL-MCNC: 124 MG/DL
WBC # BLD AUTO: 12.7 THOUSAND/UL (ref 4.31–10.16)

## 2024-04-18 PROCEDURE — 85025 COMPLETE CBC W/AUTO DIFF WBC: CPT

## 2024-04-18 PROCEDURE — 80053 COMPREHEN METABOLIC PANEL: CPT

## 2024-04-18 PROCEDURE — 36415 COLL VENOUS BLD VENIPUNCTURE: CPT

## 2024-04-18 PROCEDURE — 80061 LIPID PANEL: CPT

## 2024-04-23 ENCOUNTER — OFFICE VISIT (OUTPATIENT)
Dept: FAMILY MEDICINE CLINIC | Facility: CLINIC | Age: 54
End: 2024-04-23
Payer: COMMERCIAL

## 2024-04-23 VITALS
BODY MASS INDEX: 29.38 KG/M2 | HEIGHT: 63 IN | DIASTOLIC BLOOD PRESSURE: 80 MMHG | HEART RATE: 86 BPM | WEIGHT: 165.8 LBS | OXYGEN SATURATION: 99 % | SYSTOLIC BLOOD PRESSURE: 110 MMHG

## 2024-04-23 DIAGNOSIS — E87.6 HYPOKALEMIA: ICD-10-CM

## 2024-04-23 DIAGNOSIS — E78.5 HYPERLIPIDEMIA, UNSPECIFIED HYPERLIPIDEMIA TYPE: ICD-10-CM

## 2024-04-23 DIAGNOSIS — Z12.31 ENCOUNTER FOR SCREENING MAMMOGRAM FOR MALIGNANT NEOPLASM OF BREAST: ICD-10-CM

## 2024-04-23 DIAGNOSIS — K51.20 CHRONIC ULCERATIVE PROCTITIS WITHOUT COMPLICATIONS (HCC): ICD-10-CM

## 2024-04-23 DIAGNOSIS — Z00.00 HEALTH MAINTENANCE EXAMINATION: Primary | ICD-10-CM

## 2024-04-23 DIAGNOSIS — G47.00 INSOMNIA, UNSPECIFIED TYPE: ICD-10-CM

## 2024-04-23 DIAGNOSIS — Z72.0 TOBACCO USE: ICD-10-CM

## 2024-04-23 DIAGNOSIS — Z12.11 COLON CANCER SCREENING: ICD-10-CM

## 2024-04-23 PROCEDURE — 99396 PREV VISIT EST AGE 40-64: CPT | Performed by: FAMILY MEDICINE

## 2024-04-23 RX ORDER — ZOLPIDEM TARTRATE 12.5 MG/1
12.5 TABLET, FILM COATED, EXTENDED RELEASE ORAL
Qty: 30 TABLET | Refills: 5 | Status: SHIPPED | OUTPATIENT
Start: 2024-04-23 | End: 2024-05-01 | Stop reason: SDUPTHER

## 2024-04-23 NOTE — PROGRESS NOTES
Name: Michelle Espinosa      : 1970      MRN: [id number]  Encounter Provider: Maldonado Ng MD  Encounter Date: 2024   Encounter department: [address]    Assessment & Plan   Return visit in 1 year for annual physical  1. Health maintenance examination  -     Ambulatory Referral to Obstetrics / Gynecology; Future    2. 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 Tobacco use  -     CT lung screening program; Future; Expected date: 2024    4. Hyperlipidemia, unspecified hyperlipidemia type  -     CBC and differential; Future  -     Comprehensive metabolic panel; Future  -     Lipid panel; Future  -     TSH, 3rd generation with Free T4 reflex; Future    5. Hypokalemia    6. Encounter for screening mammogram for malignant neoplasm of breast  -     Mammo screening bilateral w 3d & cad; Future; Expected date: 2024    7. Colon cancer screening  -     Ambulatory Referral to Colorectal Surgery; Future    8. Insomnia, unspecified type  -     zolpidem (AMBIEN CR) 12.5 MG CR tablet; Take 1 tablet (12.5 mg total) by mouth daily at bedtime as needed for sleep        Depression Screening and Follow-up Plan: Patient was screened for depression during today's encounter. They screened negative with a PHQ-2 score of 0.    Tobacco Cessation Counseling: Tobacco cessation counseling was provided. The patient is sincerely urged to quit consumption of tobacco. She is not ready to quit tobacco.     Lung Cancer Screening Shared Decision Making: I discussed with her that she is a candidate for lung cancer CT screening.     The following Shared Decision-Making points were covered:  Benefits of screening were discussed, including the rates of reduction in death from lung cancer and other causes.  Harms of screening were reviewed, including false positive tests, radiation exposure levels, risks of invasive procedures, risks of complications of screening, and  "risk of overdiagnosis.  I counseled on the importance of adherence to annual lung cancer LDCT screening, impact of co-morbidities, and ability or willingness to undergo diagnosis and treatment.  I counseled on the importance of maintaining abstinence as a former smoker or was counseled on the importance of smoking cessation if a current smoker    Review of Eligibility Criteria: She meets all of the criteria for Lung Cancer Screening.   - She is 54 y.o.   - She has 20 pack year tobacco history and is a current smoker or has quit within the past 15 years  - She presents no signs or symptoms of lung cancer    After discussion, the patient decided to elect lung cancer screening.        Subjective      Patient comes in for checkup.      Review of Systems   Constitutional: Negative.    Respiratory: Negative.     Cardiovascular: Negative.    Gastrointestinal: Negative.        Current Outpatient Medications on File Prior to Visit   Medication Sig    Calcium Carb-Cholecalciferol (Calcium 500/D) 500-400 MG-UNIT CHEW Chew 1 tablet daily    Multiple Vitamin (multivitamin) tablet Take 1 tablet by mouth daily    potassium chloride (MICRO-K) 10 MEQ CR capsule take 1 capsule by mouth three times a day    Probiotic Product (ALIGN PO) Take 1 capsule by mouth daily    rosuvastatin (CRESTOR) 10 MG tablet take 1 tablet by mouth once daily    [DISCONTINUED] zolpidem (AMBIEN CR) 12.5 MG CR tablet take 1 tablet by mouth daily at bedtime if needed for sleep    Potassium 99 MG TABS Take 1 tablet by mouth daily       Objective     /80   Pulse 86   Ht 5' 3\" (1.6 m)   Wt 75.2 kg (165 lb 12.8 oz)   SpO2 99%   BMI 29.37 kg/m²     Physical Exam  Constitutional:       Appearance: Normal appearance. She is well-developed.   HENT:      Head: Normocephalic and atraumatic.      Right Ear: Tympanic membrane, ear canal and external ear normal.      Left Ear: Tympanic membrane, ear canal and external ear normal.      Mouth/Throat:      Mouth: " Mucous membranes are moist.      Pharynx: Oropharynx is clear.   Eyes:      Pupils: Pupils are equal, round, and reactive to light.   Neck:      Thyroid: No thyromegaly.   Cardiovascular:      Rate and Rhythm: Normal rate and regular rhythm.      Heart sounds: Normal heart sounds.   Pulmonary:      Effort: Pulmonary effort is normal.      Breath sounds: Normal breath sounds.   Abdominal:      Palpations: Abdomen is soft. There is no mass.      Tenderness: There is no abdominal tenderness.   Musculoskeletal:         General: Normal range of motion.      Cervical back: Neck supple.   Lymphadenopathy:      Cervical: No cervical adenopathy.   Skin:     General: Skin is warm and dry.   Neurological:      Mental Status: She is alert.   Psychiatric:         Mood and Affect: Mood normal.         Behavior: Behavior normal.       Maldonado Ng MD

## 2024-04-30 ENCOUNTER — TELEPHONE (OUTPATIENT)
Age: 54
End: 2024-04-30

## 2024-04-30 ENCOUNTER — PREP FOR PROCEDURE (OUTPATIENT)
Age: 54
End: 2024-04-30

## 2024-04-30 NOTE — TELEPHONE ENCOUNTER
04/30/24  Screened by: Zohreh Antonio    Referring Provider     Pre- Screening:     There is no height or weight on file to calculate BMI. 29.37  Has patient been referred for a routine screening Colonoscopy? yes  Is the patient between 45-75 years old? yes      Previous Colonoscopy yes   If yes:    Date: 9/29/20    Facility: [facility]    Reason: Screening    Does the patient want to see a Gastroenterologist prior to their procedure OR are they having any GI symptoms? no    Has the patient been hospitalized or had abdominal surgery in the past 6 months? no    Does the patient use supplemental oxygen? no    Does the patient take Coumadin, Lovenox, Plavix, Elliquis, Xarelto, or other blood thinning medication? no    Has the patient had a stroke, cardiac event, or stent placed in the past year? no      If patient is between 45yrs - 49yrs, please advise patient that we will have to confirm benefits & coverage with their insurance company for a routine screening colonoscopy.

## 2024-05-01 DIAGNOSIS — E78.5 HYPERLIPIDEMIA, UNSPECIFIED HYPERLIPIDEMIA TYPE: ICD-10-CM

## 2024-05-01 DIAGNOSIS — G47.00 INSOMNIA, UNSPECIFIED TYPE: ICD-10-CM

## 2024-05-01 DIAGNOSIS — E87.6 HYPOKALEMIA: Primary | ICD-10-CM

## 2024-05-02 RX ORDER — ROSUVASTATIN CALCIUM 10 MG/1
10 TABLET, COATED ORAL DAILY
Qty: 30 TABLET | Refills: 5 | Status: SHIPPED | OUTPATIENT
Start: 2024-05-02

## 2024-05-02 RX ORDER — ZOLPIDEM TARTRATE 12.5 MG/1
12.5 TABLET, FILM COATED, EXTENDED RELEASE ORAL
Qty: 30 TABLET | Refills: 5 | Status: SHIPPED | OUTPATIENT
Start: 2024-05-02

## 2024-05-07 ENCOUNTER — HOSPITAL ENCOUNTER (OUTPATIENT)
Dept: CT IMAGING | Facility: CLINIC | Age: 54
Discharge: HOME | End: 2024-05-07
Payer: COMMERCIAL

## 2024-05-07 DIAGNOSIS — Z72.0 TOBACCO USE: ICD-10-CM

## 2024-05-07 PROCEDURE — 71271 CT THORAX LUNG CANCER SCR C-: CPT

## 2024-05-09 DIAGNOSIS — E87.6 HYPOKALEMIA: Primary | ICD-10-CM

## 2024-05-09 DIAGNOSIS — E87.6 HYPOKALEMIA: ICD-10-CM

## 2024-05-16 ENCOUNTER — HOSPITAL ENCOUNTER (OUTPATIENT)
Age: 54
Discharge: HOME | End: 2024-05-16
Payer: COMMERCIAL

## 2024-05-16 DIAGNOSIS — Z12.31 ENCOUNTER FOR SCREENING MAMMOGRAM FOR MALIGNANT NEOPLASM OF BREAST: ICD-10-CM

## 2024-05-16 PROCEDURE — 77067 SCR MAMMO BI INCL CAD: CPT

## 2024-05-16 PROCEDURE — 77063 BREAST TOMOSYNTHESIS BI: CPT

## 2024-05-22 ENCOUNTER — OFFICE VISIT (OUTPATIENT)
Age: 54
End: 2024-05-22
Payer: COMMERCIAL

## 2024-05-22 ENCOUNTER — OFFICE VISIT (OUTPATIENT)
Dept: FAMILY MEDICINE CLINIC | Facility: CLINIC | Age: 54
End: 2024-05-22
Payer: COMMERCIAL

## 2024-05-22 ENCOUNTER — PREP FOR PROCEDURE (OUTPATIENT)
Age: 54
End: 2024-05-22

## 2024-05-22 VITALS
OXYGEN SATURATION: 98 % | WEIGHT: 163 LBS | BODY MASS INDEX: 28.88 KG/M2 | HEART RATE: 67 BPM | SYSTOLIC BLOOD PRESSURE: 122 MMHG | HEIGHT: 63 IN | TEMPERATURE: 98 F | DIASTOLIC BLOOD PRESSURE: 62 MMHG

## 2024-05-22 VITALS
HEIGHT: 63 IN | HEART RATE: 84 BPM | BODY MASS INDEX: 29.23 KG/M2 | OXYGEN SATURATION: 95 % | DIASTOLIC BLOOD PRESSURE: 79 MMHG | SYSTOLIC BLOOD PRESSURE: 139 MMHG | WEIGHT: 165 LBS

## 2024-05-22 DIAGNOSIS — Z12.11 COLON CANCER SCREENING: ICD-10-CM

## 2024-05-22 DIAGNOSIS — E66.3 OVERWEIGHT: ICD-10-CM

## 2024-05-22 DIAGNOSIS — Z86.010 HISTORY OF COLON POLYPS: Primary | ICD-10-CM

## 2024-05-22 DIAGNOSIS — Z12.4 SCREENING FOR CERVICAL CANCER: Primary | ICD-10-CM

## 2024-05-22 DIAGNOSIS — Z12.11 SCREENING FOR COLON CANCER: Primary | ICD-10-CM

## 2024-05-22 PROCEDURE — G0145 SCR C/V CYTO,THINLAYER,RESCR: HCPCS | Performed by: PHYSICIAN ASSISTANT

## 2024-05-22 PROCEDURE — S0612 ANNUAL GYNECOLOGICAL EXAMINA: HCPCS | Performed by: PHYSICIAN ASSISTANT

## 2024-05-22 PROCEDURE — 99202 OFFICE O/P NEW SF 15 MIN: CPT | Performed by: COLON & RECTAL SURGERY

## 2024-05-22 RX ORDER — SOD SULF/POT CHLORIDE/MAG SULF 1.479 G
1479 TABLET ORAL 2 TIMES DAILY
Qty: 24 TABLET | Refills: 0 | Status: SHIPPED | OUTPATIENT
Start: 2024-05-22

## 2024-05-22 NOTE — PROGRESS NOTES
"Ambulatory Visit  Name: Michelle Espinosa      : 1970      MRN: [id number]  Encounter Provider: Brandon Spencer MD  Encounter Date: 2024   Encounter department: . [facility]    Assessment & Plan   1. History of colon polyps  -     Colonoscopy; Future; Expected date: 2024  2. Colon cancer screening  -     Ambulatory Referral to Colorectal Surgery  -     Colonoscopy; Future; Expected date: 2024  3. Overweight      History of Present Illness     Michelle Espinosa is a 54 y.o. female who presents history of colonic polyps.  Patient presents for surveillance colonoscopy.  Last colonoscopic evaluation 2019    Review of Systems   Constitutional:  Negative for chills and fever.   HENT:  Negative for ear pain and sore throat.    Eyes:  Negative for pain and visual disturbance.   Respiratory:  Negative for cough and shortness of breath.    Cardiovascular:  Negative for chest pain and palpitations.   Gastrointestinal:  Negative for abdominal pain and vomiting.   Genitourinary:  Negative for dysuria and hematuria.   Musculoskeletal:  Negative for arthralgias and back pain.   Skin:  Negative for color change and rash.   Neurological:  Negative for seizures and syncope.   All other systems reviewed and are negative.    Medical History Reviewed by provider this encounter:  Tobacco  Allergies  Meds  Problems  Med Hx  Surg Hx  Fam Hx       Objective     /79   Pulse 84   Ht 5' 3\" (1.6 m)   Wt 74.8 kg (165 lb)   SpO2 95%   BMI 29.23 kg/m²     Physical Exam  Vitals reviewed.   Constitutional:       Appearance: Normal appearance.   HENT:      Head: Normocephalic and atraumatic.   Eyes:      Conjunctiva/sclera: Conjunctivae normal.   Cardiovascular:      Rate and Rhythm: Regular rhythm.   Pulmonary:      Breath sounds: Rhonchi and rales present.   Abdominal:      General: Bowel sounds are normal.      Palpations: Abdomen is soft.   Musculoskeletal:      " Cervical back: Neck supple.   Skin:     General: Skin is warm and dry.   Neurological:      Mental Status: She is alert and oriented to person, place, and time.   Psychiatric:         Mood and Affect: Mood normal.         Behavior: Behavior normal.         Thought Content: Thought content normal.         Judgment: Judgment normal.       Administrative Statements

## 2024-05-22 NOTE — PROGRESS NOTES
Subjective     Michelle Espinosa is a 54 y.o. female here for a routine exam.  Current complaints: none.  Personal health questionnaire reviewed: not asked.     Gynecologic History  No LMP recorded. Patient is postmenopausal.  Contraception: post menopausal status  Last Pap: . Results were: normal  Last mammogram: 2024. Results were: abnormal    Obstetric History  OB History    Para Term  AB Living                 SAB IAB Ectopic Multiple Live Births           2         The following portions of the patient's history were reviewed and updated as appropriate: She  has a past medical history of Allergic, Anxiety (), and Hypokalemia.  She   Patient Active Problem List    Diagnosis Date Noted   • Leukocytosis 2023   • Alcohol abuse 2023   • Tobacco use 2021   • Ulcerative (chronic) proctitis ([facility]) 09/10/2020   • Diastolic dysfunction 2020   • ABELARDO (acute kidney injury) ([facility]) 2020   • Hypokalemia 2020   • Health maintenance examination 2018   • Insomnia 2018   • Hyperlipidemia 2018     She  has a past surgical history that includes Cervical biopsy w/ loop electrode excision.  Her family history includes Colon cancer in her maternal grandfather; No Known Problems in her daughter, maternal aunt, maternal aunt, maternal grandmother, mother, and paternal grandmother; Suicide Attempts in her father.  She  reports that she has been smoking cigarettes. She started smoking about 3 years ago. She has a 1.9 pack-year smoking history. She has never used smokeless tobacco. She reports that she does not currently use alcohol after a past usage of about 3.0 standard drinks of alcohol per week. She reports current drug use. Drug: Marijuana.  Current Outpatient Medications   Medication Sig Dispense Refill   • Calcium Carb-Cholecalciferol (Calcium 500/D) 500-400 MG-UNIT CHEW Chew 1 tablet daily     • Multiple Vitamin (multivitamin) tablet Take 1 tablet by mouth  "daily     • potassium chloride (MICRO-K) 10 MEQ CR capsule take 1 capsule by mouth three times a day 90 capsule 3   • Probiotic Product (ALIGN PO) Take 1 capsule by mouth daily     • rosuvastatin (CRESTOR) 10 MG tablet Take 1 tablet (10 mg total) by mouth daily 30 tablet 5   • zolpidem (AMBIEN CR) 12.5 MG CR tablet Take 1 tablet (12.5 mg total) by mouth daily at bedtime as needed for sleep 30 tablet 5     No current facility-administered medications for this visit.     Current Outpatient Medications on File Prior to Visit   Medication Sig   • Calcium Carb-Cholecalciferol (Calcium 500/D) 500-400 MG-UNIT CHEW Chew 1 tablet daily   • Multiple Vitamin (multivitamin) tablet Take 1 tablet by mouth daily   • potassium chloride (MICRO-K) 10 MEQ CR capsule take 1 capsule by mouth three times a day   • Probiotic Product (ALIGN PO) Take 1 capsule by mouth daily   • rosuvastatin (CRESTOR) 10 MG tablet Take 1 tablet (10 mg total) by mouth daily   • zolpidem (AMBIEN CR) 12.5 MG CR tablet Take 1 tablet (12.5 mg total) by mouth daily at bedtime as needed for sleep     No current facility-administered medications on file prior to visit.     She is allergic to amoxil [amoxicillin]..    Review of Systems  Pertinent items are noted in HPI.      Objective     /62   Pulse 67   Temp 98 °F (36.7 °C)   Ht 5' 3\" (1.6 m)   Wt 73.9 kg (163 lb)   SpO2 98%   BMI 28.87 kg/m²   General appearance: alert and oriented, in no acute distress  Neck: no adenopathy, no carotid bruit, no JVD, supple, symmetrical, trachea midline, and thyroid not enlarged, symmetric, no tenderness/mass/nodules  Back: symmetric, no curvature. ROM normal. No CVA tenderness.  Lungs: clear to auscultation bilaterally  Breasts: normal appearance, no masses or tenderness  Heart: regular rate and rhythm, S1, S2 normal, no murmur, click, rub or gallop  Abdomen: soft, non-tender; bowel sounds normal; no masses,  no organomegaly  Pelvic: external genitalia normal, vagina " normal without discharge, uterus normal size, shape, and consistency, no cervical motion tenderness, cervix normal in appearance, no adnexal masses or tenderness, and rectovaginal septum normal  Extremities: extremities normal, warm and well-perfused; no cyanosis, clubbing, or edema  Pulses: 2+ and symmetric  Skin: Skin color, texture, turgor normal. No rashes or lesions  Lymph nodes: Cervical, supraclavicular, and axillary nodes normal.  Neurologic: Grossly normal      Assessment     Healthy female exam.      Plan     Follow up in: 1 year.  Pap done

## 2024-05-22 NOTE — H&P (VIEW-ONLY)
"Ambulatory Visit  Name: Michelle Espinosa      : 1970      MRN: [id number]  Encounter Provider: Brandon Spencer MD  Encounter Date: 2024   Encounter department: . [place]    Assessment & Plan   1. History of colon polyps  -     Colonoscopy; Future; Expected date: 2024  2. Colon cancer screening  -     Ambulatory Referral to Colorectal Surgery  -     Colonoscopy; Future; Expected date: 2024  3. Overweight      History of Present Illness     Michelle Espinosa is a 54 y.o. female who presents history of colonic polyps.  Patient presents for surveillance colonoscopy.  Last colonoscopic evaluation 2019    Review of Systems   Constitutional:  Negative for chills and fever.   HENT:  Negative for ear pain and sore throat.    Eyes:  Negative for pain and visual disturbance.   Respiratory:  Negative for cough and shortness of breath.    Cardiovascular:  Negative for chest pain and palpitations.   Gastrointestinal:  Negative for abdominal pain and vomiting.   Genitourinary:  Negative for dysuria and hematuria.   Musculoskeletal:  Negative for arthralgias and back pain.   Skin:  Negative for color change and rash.   Neurological:  Negative for seizures and syncope.   All other systems reviewed and are negative.    Medical History Reviewed by provider this encounter:  Tobacco  Allergies  Meds  Problems  Med Hx  Surg Hx  Fam Hx       Objective     /79   Pulse 84   Ht 5' 3\" (1.6 m)   Wt 74.8 kg (165 lb)   SpO2 95%   BMI 29.23 kg/m²     Physical Exam  Vitals reviewed.   Constitutional:       Appearance: Normal appearance.   HENT:      Head: Normocephalic and atraumatic.   Eyes:      Conjunctiva/sclera: Conjunctivae normal.   Cardiovascular:      Rate and Rhythm: Regular rhythm.   Pulmonary:      Breath sounds: Rhonchi and rales present.   Abdominal:      General: Bowel sounds are normal.      Palpations: Abdomen is soft.   Musculoskeletal:      " Cervical back: Neck supple.   Skin:     General: Skin is warm and dry.   Neurological:      Mental Status: She is alert and oriented to person, place, and time.   Psychiatric:         Mood and Affect: Mood normal.         Behavior: Behavior normal.         Thought Content: Thought content normal.         Judgment: Judgment normal.       Administrative Statements

## 2024-05-22 NOTE — PATIENT INSTRUCTIONS
Scheduled date of colonoscopy (as of today): 6/3/24  Physician performing colonoscopy: Tj  Location of colonoscopy: [place]  Bowel prep reviewed with patient: Sutab  Instructions reviewed with patient by: Tasneem BATRES  Clearances:

## 2024-05-23 ENCOUNTER — HOSPITAL ENCOUNTER (OUTPATIENT)
Dept: ULTRASOUND IMAGING | Facility: CLINIC | Age: 54
End: 2024-05-23
Payer: COMMERCIAL

## 2024-05-23 DIAGNOSIS — R92.8 ABNORMAL MAMMOGRAM: ICD-10-CM

## 2024-05-23 PROBLEM — Z00.00 HEALTH MAINTENANCE EXAMINATION: Status: RESOLVED | Noted: 2018-11-08 | Resolved: 2024-05-23

## 2024-05-23 PROCEDURE — 76642 ULTRASOUND BREAST LIMITED: CPT

## 2024-05-30 LAB
LAB AP GYN PRIMARY INTERPRETATION: NORMAL
Lab: NORMAL

## 2024-06-03 ENCOUNTER — ANESTHESIA (OUTPATIENT)
Dept: GASTROENTEROLOGY | Facility: HOSPITAL | Age: 54
End: 2024-06-03

## 2024-06-03 ENCOUNTER — ANESTHESIA EVENT (OUTPATIENT)
Dept: GASTROENTEROLOGY | Facility: HOSPITAL | Age: 54
End: 2024-06-03

## 2024-06-03 ENCOUNTER — HOSPITAL ENCOUNTER (OUTPATIENT)
Dept: GASTROENTEROLOGY | Facility: HOSPITAL | Age: 54
Setting detail: OUTPATIENT SURGERY
Discharge: HOME | End: 2024-06-03
Attending: COLON & RECTAL SURGERY
Payer: COMMERCIAL

## 2024-06-03 VITALS
OXYGEN SATURATION: 98 % | DIASTOLIC BLOOD PRESSURE: 79 MMHG | RESPIRATION RATE: 19 BRPM | HEART RATE: 76 BPM | TEMPERATURE: 98 F | WEIGHT: 160.05 LBS | SYSTOLIC BLOOD PRESSURE: 131 MMHG | BODY MASS INDEX: 28.36 KG/M2 | HEIGHT: 63 IN

## 2024-06-03 DIAGNOSIS — Z12.11 COLON CANCER SCREENING: ICD-10-CM

## 2024-06-03 DIAGNOSIS — Z86.010 HISTORY OF COLON POLYPS: ICD-10-CM

## 2024-06-03 PROCEDURE — 88305 TISSUE EXAM BY PATHOLOGIST: CPT | Performed by: PATHOLOGY

## 2024-06-03 PROCEDURE — 45380 COLONOSCOPY AND BIOPSY: CPT | Performed by: COLON & RECTAL SURGERY

## 2024-06-03 RX ORDER — LIDOCAINE HYDROCHLORIDE 20 MG/ML
INJECTION, SOLUTION EPIDURAL; INFILTRATION; INTRACAUDAL; PERINEURAL AS NEEDED
Status: DISCONTINUED | OUTPATIENT
Start: 2024-06-03 | End: 2024-06-03

## 2024-06-03 RX ORDER — SODIUM CHLORIDE, SODIUM LACTATE, POTASSIUM CHLORIDE, CALCIUM CHLORIDE 600; 310; 30; 20 MG/100ML; MG/100ML; MG/100ML; MG/100ML
50 INJECTION, SOLUTION INTRAVENOUS CONTINUOUS
Status: CANCELLED | OUTPATIENT
Start: 2024-06-03

## 2024-06-03 RX ORDER — SODIUM CHLORIDE, SODIUM LACTATE, POTASSIUM CHLORIDE, CALCIUM CHLORIDE 600; 310; 30; 20 MG/100ML; MG/100ML; MG/100ML; MG/100ML
50 INJECTION, SOLUTION INTRAVENOUS CONTINUOUS
Status: DISCONTINUED | OUTPATIENT
Start: 2024-06-03 | End: 2024-06-07 | Stop reason: HOSPADM

## 2024-06-03 RX ORDER — LIDOCAINE HYDROCHLORIDE 10 MG/ML
0.5 INJECTION, SOLUTION EPIDURAL; INFILTRATION; INTRACAUDAL; PERINEURAL ONCE AS NEEDED
Status: CANCELLED | OUTPATIENT
Start: 2024-06-03

## 2024-06-03 RX ORDER — LIDOCAINE HYDROCHLORIDE 10 MG/ML
0.5 INJECTION, SOLUTION EPIDURAL; INFILTRATION; INTRACAUDAL; PERINEURAL ONCE AS NEEDED
Status: DISCONTINUED | OUTPATIENT
Start: 2024-06-03 | End: 2024-06-07 | Stop reason: HOSPADM

## 2024-06-03 RX ORDER — PROPOFOL 10 MG/ML
INJECTION, EMULSION INTRAVENOUS AS NEEDED
Status: DISCONTINUED | OUTPATIENT
Start: 2024-06-03 | End: 2024-06-03

## 2024-06-03 RX ADMIN — PROPOFOL 30 MG: 10 INJECTION, EMULSION INTRAVENOUS at 11:03

## 2024-06-03 RX ADMIN — PROPOFOL 30 MG: 10 INJECTION, EMULSION INTRAVENOUS at 11:00

## 2024-06-03 RX ADMIN — PROPOFOL 20 MG: 10 INJECTION, EMULSION INTRAVENOUS at 11:07

## 2024-06-03 RX ADMIN — PROPOFOL 50 MG: 10 INJECTION, EMULSION INTRAVENOUS at 10:58

## 2024-06-03 RX ADMIN — PROPOFOL 30 MG: 10 INJECTION, EMULSION INTRAVENOUS at 10:56

## 2024-06-03 RX ADMIN — LIDOCAINE HYDROCHLORIDE 60 MG: 20 INJECTION, SOLUTION EPIDURAL; INFILTRATION; INTRACAUDAL; PERINEURAL at 10:51

## 2024-06-03 RX ADMIN — PROPOFOL 20 MG: 10 INJECTION, EMULSION INTRAVENOUS at 11:05

## 2024-06-03 RX ADMIN — PROPOFOL 20 MG: 10 INJECTION, EMULSION INTRAVENOUS at 10:54

## 2024-06-03 RX ADMIN — PROPOFOL 100 MG: 10 INJECTION, EMULSION INTRAVENOUS at 10:51

## 2024-06-03 RX ADMIN — PROPOFOL 20 MG: 10 INJECTION, EMULSION INTRAVENOUS at 11:01

## 2024-06-03 RX ADMIN — PROPOFOL 50 MG: 10 INJECTION, EMULSION INTRAVENOUS at 11:09

## 2024-06-03 RX ADMIN — SODIUM CHLORIDE, SODIUM LACTATE, POTASSIUM CHLORIDE, AND CALCIUM CHLORIDE 50 ML/HR: .6; .31; .03; .02 INJECTION, SOLUTION INTRAVENOUS at 10:01

## 2024-06-03 NOTE — INTERVAL H&P NOTE
H&P reviewed. After examining the patient I find no changes in the patients condition since the H&P had been written.    Vitals:    06/03/24 0954   BP: 130/68   Pulse: 67   Resp: 20   Temp: 98 °F (36.7 °C)   SpO2: 98%

## 2024-06-03 NOTE — ANESTHESIA PREPROCEDURE EVALUATION
Procedure:  COLONOSCOPY    Relevant Problems   CARDIO   (+) Hyperlipidemia      Behavioral Health   (+) Tobacco use      Confirmed NPO appropriate    Physical Exam    Airway    Mallampati score: III  TM Distance: >3 FB  Neck ROM: full     Dental   No notable dental hx     Cardiovascular      Pulmonary      Other Findings  post-pubertal.    7/26/20 TTE: Normal biventricular systolic function. Grade I diastolic dysfunction. No significant valvular disease.    Anesthesia Plan  ASA Score- 2     Anesthesia Type- IV sedation with anesthesia with ASA Monitors.         Additional Monitors:     Airway Plan:            Plan Factors-Exercise tolerance (METS): >4 METS.Exercise comment: Able to climb two flights of stairs without cardiopulmonary limitation.    Chart reviewed.   Existing labs reviewed.     Patient is a current smoker.              Induction- intravenous.    Postoperative Plan-     Perioperative Resuscitation Plan - Level 1 - Full Code.       Informed Consent- Anesthetic plan and risks discussed with patient.

## 2024-06-03 NOTE — ANESTHESIA POSTPROCEDURE EVALUATION
Post-Op Assessment Note    CV Status:  Stable    Pain management: adequate       Mental Status:  Sleepy   Hydration Status:  Euvolemic   PONV Controlled:  Controlled   Airway Patency:  Patent     Post Op Vitals Reviewed: Yes    No anethesia notable event occurred.    Staff: Anesthesiologist               BP   134/78   Temp      Pulse  72   Resp   15   SpO2   97%

## 2024-06-04 ENCOUNTER — TELEPHONE (OUTPATIENT)
Age: 54
End: 2024-06-04

## 2024-06-04 DIAGNOSIS — Z12.11 SCREENING FOR COLON CANCER: Primary | ICD-10-CM

## 2024-06-05 ENCOUNTER — TELEPHONE (OUTPATIENT)
Age: 54
End: 2024-06-05

## 2024-06-05 ENCOUNTER — NURSE TRIAGE (OUTPATIENT)
Age: 54
End: 2024-06-05

## 2024-06-05 PROCEDURE — 88305 TISSUE EXAM BY PATHOLOGIST: CPT | Performed by: PATHOLOGY

## 2024-06-05 NOTE — TELEPHONE ENCOUNTER
"Reason for Disposition   Nursing judgment    Answer Assessment - Initial Assessment Questions  1. REASON FOR CALL or QUESTION: \"What is your reason for calling today?\" or \"How can I best help you?\" or \"What question do you have that I can help answer?\"          Pt. Is asking that Dr. Covington call her regarding her CT colonoscopy scheduled for this Friday, pt. Is unaware that a tube would be placed in her rectum or that she would be awake, after speaking with CT tech today she is unsure if she wants to have procedure, she had additional question I was unable to answer, please call her to discuss    Protocols used: Information Only Call - No Triage-ADULT-[place]    "

## 2024-06-05 NOTE — TELEPHONE ENCOUNTER
Pt called with questions in regard to CT Colonoscopy. Call was transferred to Dasha perez nurse to assist the pt.

## 2024-07-01 ENCOUNTER — TELEPHONE (OUTPATIENT)
Dept: FAMILY MEDICINE CLINIC | Facility: CLINIC | Age: 54
End: 2024-07-01

## 2024-07-01 NOTE — TELEPHONE ENCOUNTER
Patient was scheduled to see Dr Peguero for knee pain on 7/3.  She canceled and sent this message:    Patient comments: I ended up at [facility] urgent care on Friday.  Had xrays done.  I have Arthritis really bad in my knee.  They gave me Prednisone, and said to take advil.  I was told to update Dr. Ng so if it flairs up again he could call in a script for the prednisone.

## 2024-07-03 ENCOUNTER — OFFICE VISIT (OUTPATIENT)
Dept: FAMILY MEDICINE CLINIC | Facility: CLINIC | Age: 54
End: 2024-07-03
Payer: COMMERCIAL

## 2024-07-03 VITALS
BODY MASS INDEX: 28.35 KG/M2 | WEIGHT: 160 LBS | TEMPERATURE: 98 F | HEIGHT: 63 IN | HEART RATE: 74 BPM | SYSTOLIC BLOOD PRESSURE: 128 MMHG | OXYGEN SATURATION: 98 % | DIASTOLIC BLOOD PRESSURE: 62 MMHG

## 2024-07-03 DIAGNOSIS — M25.562 ACUTE PAIN OF LEFT KNEE: Primary | ICD-10-CM

## 2024-07-03 PROCEDURE — 99214 OFFICE O/P EST MOD 30 MIN: CPT | Performed by: STUDENT IN AN ORGANIZED HEALTH CARE EDUCATION/TRAINING PROGRAM

## 2024-07-03 RX ORDER — PREDNISONE 20 MG/1
20 TABLET ORAL 2 TIMES DAILY
COMMUNITY
Start: 2024-06-28 | End: 2024-07-05

## 2024-07-03 RX ORDER — IBUPROFEN 800 MG/1
800 TABLET ORAL EVERY 6 HOURS PRN
COMMUNITY
Start: 2024-06-28 | End: 2024-07-08

## 2024-07-03 NOTE — PROGRESS NOTES
Assessment/Plan:         Problem List Items Addressed This Visit    None  Visit Diagnoses     Acute pain of left knee    -  Primary    Relevant Medications    diclofenac sodium (VOLTAREN) 50 mg EC tablet    Other Relevant Orders    Ambulatory Referral to Orthopedic Surgery    Ambulatory Referral to Physical Therapy          Reviewed image notes     Subjective:      Patient ID: Michelle Espinosa is a 54 y.o. female.    HPI    Knee pain, had x rays at [facility] showing spurs and arthritis. Issue with going up the stairs more so than anything else.     INDINGS:    No acute bony abnormality.  Alignment appears normal.  There is spurring of the tibial spines, mild tricompartmental degenerative  changes and a moderate sized joint effusion.  Overlying soft tissues are unremarkable.    IMPRESSION:  IMPRESSION:  No acute bony abnormality. Joint effusion. Mild degenerative changes.     The following portions of the patient's history were reviewed and updated as appropriate:   Past Medical History:  She has a past medical history of Allergic, Anxiety (2021), and Hypokalemia.,  _______________________________________________________________________  Medical Problems:  does not have any pertinent problems on file.,  _______________________________________________________________________  Past Surgical History:   has a past surgical history that includes Cervical biopsy w/ loop electrode excision and Colonoscopy.,  _______________________________________________________________________  Family History:  family history includes Colon cancer in her maternal grandfather; No Known Problems in her daughter, maternal aunt, maternal aunt, maternal grandmother, mother, and paternal grandmother; Suicide Attempts in her father.,  _______________________________________________________________________  Social History:   reports that she has been smoking cigarettes. She started smoking about 3 years ago. She has a 2 pack-year smoking history. She  "has never used smokeless tobacco. She reports that she does not currently use alcohol after a past usage of about 3.0 standard drinks of alcohol per week. She reports current drug use. Drug: Marijuana.,  _______________________________________________________________________  Allergies:  is allergic to amoxil [amoxicillin]..  _______________________________________________________________________  Current Outpatient Medications   Medication Sig Dispense Refill   • Calcium Carb-Cholecalciferol (Calcium 500/D) 500-400 MG-UNIT CHEW Chew 1 tablet daily     • diclofenac sodium (VOLTAREN) 50 mg EC tablet Take 1 tablet (50 mg total) by mouth 2 (two) times a day 60 tablet 0   • ibuprofen (MOTRIN) 800 mg tablet Take 800 mg by mouth every 6 (six) hours as needed     • Multiple Vitamin (multivitamin) tablet Take 1 tablet by mouth daily     • potassium chloride (MICRO-K) 10 MEQ CR capsule take 1 capsule by mouth three times a day 90 capsule 3   • predniSONE 20 mg tablet Take 20 mg by mouth 2 (two) times a day     • Probiotic Product (ALIGN PO) Take 1 capsule by mouth daily     • rosuvastatin (CRESTOR) 10 MG tablet Take 1 tablet (10 mg total) by mouth daily 30 tablet 5   • zolpidem (AMBIEN CR) 12.5 MG CR tablet Take 1 tablet (12.5 mg total) by mouth daily at bedtime as needed for sleep 30 tablet 5     No current facility-administered medications for this visit.     _______________________________________________________________________  Review of Systems   Musculoskeletal:  Positive for arthralgias, joint swelling and myalgias.         Objective:  Vitals:    07/03/24 1609   BP: 128/62   Pulse: 74   Temp: 98 °F (36.7 °C)   SpO2: 98%   Weight: 72.6 kg (160 lb)   Height: 5' 3\" (1.6 m)     Body mass index is 28.34 kg/m².     Physical Exam  Constitutional:       Appearance: Normal appearance.   HENT:      Head: Normocephalic and atraumatic.   Pulmonary:      Effort: Pulmonary effort is normal.   Musculoskeletal:      Left knee: " Swelling and crepitus present.   Neurological:      Mental Status: She is alert.

## 2024-07-05 ENCOUNTER — TELEPHONE (OUTPATIENT)
Age: 54
End: 2024-07-05

## 2024-07-05 NOTE — TELEPHONE ENCOUNTER
Patient has ortho appt on 7/17 she would like a refill on predisone to hold her over, please advise when sent

## 2024-07-06 RX ORDER — PREDNISONE 20 MG/1
20 TABLET ORAL 2 TIMES DAILY
Qty: 14 TABLET | Refills: 0 | OUTPATIENT
Start: 2024-07-06 | End: 2024-07-13

## 2024-07-09 NOTE — TELEPHONE ENCOUNTER
Pt was made aware that the prednisone will not be sent in , she is ok with that, she stated that she will be using OTC Advil and her arthritis cream until she sees Ortho on July 17

## 2024-07-09 NOTE — TELEPHONE ENCOUNTER
Patient was up all night with stomach pains from the voltaren, she is wondering why she can't get a refill on the predisone, please advise

## 2024-08-20 DIAGNOSIS — E87.6 HYPOKALEMIA: ICD-10-CM

## 2024-08-20 RX ORDER — POTASSIUM CHLORIDE 750 MG/1
10 CAPSULE, EXTENDED RELEASE ORAL 3 TIMES DAILY
Qty: 270 CAPSULE | Refills: 1 | Status: SHIPPED | OUTPATIENT
Start: 2024-08-20

## 2024-08-26 ENCOUNTER — HOSPITAL ENCOUNTER (OUTPATIENT)
Dept: CT IMAGING | Facility: HOSPITAL | Age: 54
Discharge: HOME | End: 2024-08-26
Attending: COLON & RECTAL SURGERY
Payer: COMMERCIAL

## 2024-08-26 DIAGNOSIS — Z12.11 SCREENING FOR COLON CANCER: ICD-10-CM

## 2024-08-26 PROCEDURE — 74261 CT COLONOGRAPHY DX: CPT

## 2024-11-20 DIAGNOSIS — G47.00 INSOMNIA, UNSPECIFIED TYPE: ICD-10-CM

## 2024-11-23 RX ORDER — ZOLPIDEM TARTRATE 12.5 MG/1
12.5 TABLET, FILM COATED, EXTENDED RELEASE ORAL
Qty: 30 TABLET | Refills: 5 | Status: SHIPPED | OUTPATIENT
Start: 2024-11-23

## 2024-11-26 DIAGNOSIS — E78.5 HYPERLIPIDEMIA, UNSPECIFIED HYPERLIPIDEMIA TYPE: ICD-10-CM

## 2024-11-26 RX ORDER — ROSUVASTATIN CALCIUM 10 MG/1
10 TABLET, COATED ORAL DAILY
Qty: 30 TABLET | Refills: 5 | Status: SHIPPED | OUTPATIENT
Start: 2024-11-26

## 2025-02-14 DIAGNOSIS — E87.6 HYPOKALEMIA: ICD-10-CM

## 2025-02-14 RX ORDER — POTASSIUM CHLORIDE 750 MG/1
10 CAPSULE, EXTENDED RELEASE ORAL 3 TIMES DAILY
Qty: 270 CAPSULE | Refills: 1 | Status: SHIPPED | OUTPATIENT
Start: 2025-02-14

## 2025-02-21 ENCOUNTER — OFFICE VISIT (OUTPATIENT)
Dept: FAMILY MEDICINE CLINIC | Facility: CLINIC | Age: 55
End: 2025-02-21
Payer: COMMERCIAL

## 2025-02-21 VITALS
HEIGHT: 63 IN | HEART RATE: 82 BPM | WEIGHT: 167 LBS | SYSTOLIC BLOOD PRESSURE: 118 MMHG | BODY MASS INDEX: 29.59 KG/M2 | RESPIRATION RATE: 18 BRPM | DIASTOLIC BLOOD PRESSURE: 76 MMHG | OXYGEN SATURATION: 97 %

## 2025-02-21 DIAGNOSIS — B02.9 HERPES ZOSTER WITHOUT COMPLICATION: Primary | ICD-10-CM

## 2025-02-21 PROCEDURE — 99213 OFFICE O/P EST LOW 20 MIN: CPT | Performed by: PHYSICIAN ASSISTANT

## 2025-02-21 RX ORDER — GABAPENTIN 100 MG/1
100 CAPSULE ORAL 3 TIMES DAILY
Qty: 90 CAPSULE | Refills: 0 | Status: SHIPPED | OUTPATIENT
Start: 2025-02-21

## 2025-02-21 RX ORDER — VALACYCLOVIR HYDROCHLORIDE 1 G/1
1000 TABLET, FILM COATED ORAL 3 TIMES DAILY
Qty: 21 TABLET | Refills: 0 | Status: SHIPPED | OUTPATIENT
Start: 2025-02-21 | End: 2025-02-28

## 2025-02-21 NOTE — PROGRESS NOTES
"Name: Michelle Espinosa      : 1970      MRN: [id number]  Encounter Provider: Michelle Schwab PA-C  Encounter Date: 2025   Encounter department: [facility] [address]  :  Assessment & Plan  Herpes zoster without complication    Orders:  •  valACYclovir (VALTREX) 1,000 mg tablet; Take 1 tablet (1,000 mg total) by mouth 3 (three) times a day for 7 days  •  gabapentin (Neurontin) 100 mg capsule; Take 1 capsule (100 mg total) by mouth 3 (three) times a day           History of Present Illness   Pt began with mid back pain over the weekend. She noticed a rash on her back and thought it was an allergic reaction to Biofreeze. The rash then began to spread to left side and under left breast. It is painful now also.       Review of Systems   Constitutional:  Negative for chills, diaphoresis, fatigue and fever.   Respiratory:  Negative for chest tightness and shortness of breath.    Skin:  Positive for rash.   Neurological:  Negative for numbness.       Objective   /76 (BP Location: Left arm, Patient Position: Sitting, Cuff Size: Standard)   Pulse 82   Resp 18   Ht 5' 3\" (1.6 m)   Wt 75.8 kg (167 lb)   SpO2 97%   BMI 29.58 kg/m²      Physical Exam  Vitals and nursing note reviewed.   Constitutional:       Appearance: Normal appearance.   Cardiovascular:      Rate and Rhythm: Normal rate.   Pulmonary:      Effort: Pulmonary effort is normal.   Skin:     Findings: Rash present. Rash is vesicular.          Neurological:      Mental Status: She is alert and oriented to person, place, and time.         "

## 2025-05-05 ENCOUNTER — APPOINTMENT (OUTPATIENT)
Dept: LAB | Facility: HOSPITAL | Age: 55
End: 2025-05-05
Payer: COMMERCIAL

## 2025-05-05 ENCOUNTER — OFFICE VISIT (OUTPATIENT)
Dept: FAMILY MEDICINE CLINIC | Facility: CLINIC | Age: 55
End: 2025-05-05
Payer: COMMERCIAL

## 2025-05-05 VITALS
WEIGHT: 166.8 LBS | DIASTOLIC BLOOD PRESSURE: 78 MMHG | SYSTOLIC BLOOD PRESSURE: 112 MMHG | HEIGHT: 63 IN | OXYGEN SATURATION: 98 % | BODY MASS INDEX: 29.55 KG/M2 | HEART RATE: 89 BPM

## 2025-05-05 DIAGNOSIS — E78.5 HYPERLIPIDEMIA, UNSPECIFIED HYPERLIPIDEMIA TYPE: ICD-10-CM

## 2025-05-05 DIAGNOSIS — E66.3 OVERWEIGHT (BMI 25.0-29.9): ICD-10-CM

## 2025-05-05 DIAGNOSIS — G47.00 INSOMNIA, UNSPECIFIED TYPE: ICD-10-CM

## 2025-05-05 DIAGNOSIS — E87.6 HYPOKALEMIA: ICD-10-CM

## 2025-05-05 DIAGNOSIS — D12.6 COLON ADENOMA: ICD-10-CM

## 2025-05-05 DIAGNOSIS — K51.20 CHRONIC ULCERATIVE PROCTITIS WITHOUT COMPLICATIONS (HCC): ICD-10-CM

## 2025-05-05 DIAGNOSIS — Z72.0 TOBACCO USE: ICD-10-CM

## 2025-05-05 DIAGNOSIS — R73.9 ELEVATED BLOOD SUGAR: ICD-10-CM

## 2025-05-05 DIAGNOSIS — Z00.00 HEALTH MAINTENANCE EXAMINATION: Primary | ICD-10-CM

## 2025-05-05 PROBLEM — N17.9 AKI (ACUTE KIDNEY INJURY) (HCC): Status: RESOLVED | Noted: 2020-07-25 | Resolved: 2025-05-05

## 2025-05-05 LAB
ANION GAP SERPL CALCULATED.3IONS-SCNC: 6 MMOL/L (ref 4–13)
BUN SERPL-MCNC: 9 MG/DL (ref 5–25)
CALCIUM SERPL-MCNC: 8.9 MG/DL (ref 8.4–10.2)
CHLORIDE SERPL-SCNC: 106 MMOL/L (ref 96–108)
CO2 SERPL-SCNC: 27 MMOL/L (ref 21–32)
CREAT SERPL-MCNC: 0.72 MG/DL (ref 0.6–1.3)
GFR SERPL CREATININE-BSD FRML MDRD: 94 ML/MIN/1.73SQ M
GLUCOSE P FAST SERPL-MCNC: 101 MG/DL (ref 65–99)
POTASSIUM SERPL-SCNC: 3.8 MMOL/L (ref 3.5–5.3)
SODIUM SERPL-SCNC: 139 MMOL/L (ref 135–147)

## 2025-05-05 PROCEDURE — 36415 COLL VENOUS BLD VENIPUNCTURE: CPT

## 2025-05-05 PROCEDURE — 80048 BASIC METABOLIC PNL TOTAL CA: CPT

## 2025-05-05 PROCEDURE — 99396 PREV VISIT EST AGE 40-64: CPT | Performed by: FAMILY MEDICINE

## 2025-05-05 RX ORDER — POTASSIUM CHLORIDE 750 MG/1
10 CAPSULE, EXTENDED RELEASE ORAL DAILY
Start: 2025-05-05

## 2025-05-05 RX ORDER — SEMAGLUTIDE 0.25 MG/.5ML
INJECTION, SOLUTION SUBCUTANEOUS
Qty: 2 ML | Refills: 0 | Status: SHIPPED | OUTPATIENT
Start: 2025-05-05 | End: 2025-05-09

## 2025-05-05 NOTE — PROGRESS NOTES
Depression Screening and Follow-up Plan: Patient was screened for depression during today's encounter. They screened negative with a PHQ-2 score of 0.      Tobacco Cessation Counseling: Tobacco cessation counseling was provided. The patient is sincerely urged to quit consumption of tobacco. She is not ready to quit tobacco.     Assessment/Plan:  Return visit in 1 year for annual physical       Problem List Items Addressed This Visit       Colon adenoma    Elevated blood sugar    Relevant Orders    Hemoglobin A1C    Health maintenance examination - Primary    Hyperlipidemia    Relevant Orders    CBC and differential    Comprehensive metabolic panel    Lipid panel    TSH, 3rd generation with Free T4 reflex    Hypokalemia    Relevant Medications    potassium chloride (MICRO-K) 10 MEQ CR capsule    Insomnia    Overweight (BMI 25.0-29.9)             Relevant Medications    Semaglutide-Weight Management (Wegovy) 0.25 MG/0.5ML    Other Relevant Orders    TSH, 3rd generation with Free T4 reflex    Tobacco use    Ulcerative (chronic) proctitis (HCC)         Subjective:      Patient ID: Michelle Espinosa is a 55 y.o. female.    Patient comes in for checkup.  She is concerned regarding her weight.  She is attempting lose weight on her own but has been unsuccessful.        The following portions of the patient's history were reviewed and updated as appropriate:   Past Medical History:  She has a past medical history of Allergic, Anxiety (2021), and Hypokalemia.,  _______________________________________________________________________  Medical Problems:  does not have any pertinent problems on file.,  _______________________________________________________________________  Past Surgical History:   has a past surgical history that includes Cervical biopsy w/ loop electrode excision and Colonoscopy.,  _______________________________________________________________________  Family History:  family history includes Colon cancer in her  maternal grandfather; No Known Problems in her daughter, maternal aunt, maternal aunt, maternal grandmother, mother, and paternal grandmother; Suicide Attempts in her father.,  _______________________________________________________________________  Social History:   reports that she has been smoking cigarettes. She started smoking about 4 years ago. She has a 2.4 pack-year smoking history. She has never used smokeless tobacco. She reports that she does not currently use alcohol after a past usage of about 3.0 standard drinks of alcohol per week. She reports current drug use. Drug: Marijuana.,  _______________________________________________________________________  Allergies:  is allergic to amoxil [amoxicillin]..  _______________________________________________________________________  Current Outpatient Medications   Medication Sig Dispense Refill    Calcium Carb-Cholecalciferol (Calcium 500/D) 500-400 MG-UNIT CHEW Chew 1 tablet daily      gabapentin (Neurontin) 100 mg capsule Take 1 capsule (100 mg total) by mouth 3 (three) times a day 90 capsule 0    Multiple Vitamin (multivitamin) tablet Take 1 tablet by mouth daily      potassium chloride (MICRO-K) 10 MEQ CR capsule Take 1 capsule (10 mEq total) by mouth daily      Probiotic Product (ALIGN PO) Take 1 capsule by mouth daily      rosuvastatin (CRESTOR) 10 MG tablet TAKE 1 TABLET BY MOUTH EVERY DAY 30 tablet 5    Semaglutide-Weight Management (Wegovy) 0.25 MG/0.5ML Inject 0.25 mg under the skin weekly 2 mL 0    zolpidem (AMBIEN CR) 12.5 MG CR tablet TAKE 1 TABLET (12.5 MG TOTAL) BY MOUTH DAILY AT BEDTIME AS NEEDED FOR SLEEP 30 tablet 5     No current facility-administered medications for this visit.     _______________________________________________________________________  Review of Systems   Constitutional: Negative.    Respiratory: Negative.     Cardiovascular: Negative.          Objective:  Vitals:    05/05/25 1209   BP: 112/78   Pulse: 89   SpO2: 98%  "  Weight: 75.7 kg (166 lb 12.8 oz)   Height: 5' 3\" (1.6 m)     Body mass index is 29.55 kg/m².     Physical Exam  Constitutional:       Appearance: Normal appearance. She is well-developed.   HENT:      Head: Normocephalic and atraumatic.      Right Ear: External ear normal.      Left Ear: External ear normal.   Eyes:      Pupils: Pupils are equal, round, and reactive to light.   Neck:      Thyroid: No thyromegaly.   Cardiovascular:      Rate and Rhythm: Normal rate and regular rhythm.      Heart sounds: Normal heart sounds.   Pulmonary:      Effort: Pulmonary effort is normal.      Breath sounds: Normal breath sounds.   Abdominal:      Palpations: Abdomen is soft. There is no mass.      Tenderness: There is no abdominal tenderness.   Musculoskeletal:         General: Normal range of motion.      Cervical back: Neck supple.   Lymphadenopathy:      Cervical: No cervical adenopathy.   Skin:     General: Skin is warm and dry.   Neurological:      Mental Status: She is alert.   Psychiatric:         Mood and Affect: Mood normal.         Behavior: Behavior normal.         "

## 2025-05-06 ENCOUNTER — TELEPHONE (OUTPATIENT)
Dept: FAMILY MEDICINE CLINIC | Facility: CLINIC | Age: 55
End: 2025-05-06

## 2025-05-06 NOTE — TELEPHONE ENCOUNTER
PA for (Wegovy) 0.25 MG/0.5ML SUBMITTED to     via    [x][facility]-KEY: [id number]  []Surescripts-Case ID #   []Availity-Auth ID # NDC #   []Faxed to plan   []Other website   []Phone call Case ID #     [x]PA sent as URGENT    All office notes, labs and other pertaining documents and studies sent. Clinical questions answered. Awaiting determination from insurance company.     Turnaround time for your insurance to make a decision on your Prior Authorization can take 7-21 business days.

## 2025-05-06 NOTE — TELEPHONE ENCOUNTER
PA for (Wegovy) 0.25 MG/0.5ML  DENIED    Reason:(Screenshot if applicable)        Message sent to office clinical pool Yes    Denial letter scanned into Media Yes    We can gladly do an appeal but the process can take about 30-60 days to provide determination. Please Schedule a Peer to Peer at phone [phone] . If an appeal is truly warranted please have Provider send clinical documentation to the PA department to support the appeal.     **Please follow up with your patient regarding denial and next steps**

## 2025-05-07 DIAGNOSIS — E66.3 OVERWEIGHT (BMI 25.0-29.9): ICD-10-CM

## 2025-05-09 RX ORDER — SEMAGLUTIDE 0.25 MG/.5ML
0.25 INJECTION, SOLUTION SUBCUTANEOUS WEEKLY
Qty: 2 ML | Refills: 0 | Status: SHIPPED | OUTPATIENT
Start: 2025-05-09

## 2025-05-24 DIAGNOSIS — G47.00 INSOMNIA, UNSPECIFIED TYPE: ICD-10-CM

## 2025-05-27 DIAGNOSIS — G47.00 INSOMNIA, UNSPECIFIED TYPE: ICD-10-CM

## 2025-05-27 RX ORDER — ZOLPIDEM TARTRATE 12.5 MG/1
12.5 TABLET, FILM COATED, EXTENDED RELEASE ORAL
Qty: 30 TABLET | Refills: 5 | Status: SHIPPED | OUTPATIENT
Start: 2025-05-27

## 2025-05-28 RX ORDER — ZOLPIDEM TARTRATE 12.5 MG/1
12.5 TABLET, FILM COATED, EXTENDED RELEASE ORAL
Qty: 30 TABLET | Refills: 5 | Status: SHIPPED | OUTPATIENT
Start: 2025-05-28

## 2025-06-04 PROBLEM — Z00.00 HEALTH MAINTENANCE EXAMINATION: Status: RESOLVED | Noted: 2018-11-08 | Resolved: 2025-06-04

## 2025-08-06 ENCOUNTER — APPOINTMENT (OUTPATIENT)
Dept: LAB | Facility: HOSPITAL | Age: 55
End: 2025-08-06
Payer: COMMERCIAL

## 2025-08-18 DIAGNOSIS — E78.5 HYPERLIPIDEMIA, UNSPECIFIED HYPERLIPIDEMIA TYPE: ICD-10-CM

## 2025-08-20 RX ORDER — ROSUVASTATIN CALCIUM 10 MG/1
10 TABLET, COATED ORAL DAILY
Qty: 30 TABLET | Refills: 5 | Status: SHIPPED | OUTPATIENT
Start: 2025-08-20